# Patient Record
Sex: MALE | Race: WHITE | Employment: FULL TIME | URBAN - METROPOLITAN AREA
[De-identification: names, ages, dates, MRNs, and addresses within clinical notes are randomized per-mention and may not be internally consistent; named-entity substitution may affect disease eponyms.]

---

## 2017-10-14 ENCOUNTER — APPOINTMENT (OUTPATIENT)
Dept: CT IMAGING | Facility: HOSPITAL | Age: 67
DRG: 389 | End: 2017-10-14
Attending: EMERGENCY MEDICINE
Payer: COMMERCIAL

## 2017-10-14 ENCOUNTER — HOSPITAL ENCOUNTER (INPATIENT)
Facility: HOSPITAL | Age: 67
LOS: 2 days | Discharge: HOME | DRG: 389 | End: 2017-10-16
Attending: EMERGENCY MEDICINE | Admitting: INTERNAL MEDICINE
Payer: COMMERCIAL

## 2017-10-14 DIAGNOSIS — K22.2 STRICTURE OF ESOPHAGUS: ICD-10-CM

## 2017-10-14 DIAGNOSIS — K56.609 SBO (SMALL BOWEL OBSTRUCTION) (HCC): Primary | ICD-10-CM

## 2017-10-14 DIAGNOSIS — K50.019 CROHN'S DISEASE OF ILEUM WITH COMPLICATION (HCC): ICD-10-CM

## 2017-10-14 PROCEDURE — 99285 EMERGENCY DEPT VISIT HI MDM: CPT | Performed by: COLON & RECTAL SURGERY

## 2017-10-14 PROCEDURE — 74177 CT ABD & PELVIS W/CONTRAST: CPT | Performed by: EMERGENCY MEDICINE

## 2017-10-14 RX ORDER — ONDANSETRON 2 MG/ML
4 INJECTION INTRAMUSCULAR; INTRAVENOUS EVERY 4 HOURS PRN
Status: DISCONTINUED | OUTPATIENT
Start: 2017-10-14 | End: 2017-10-16

## 2017-10-14 RX ORDER — SODIUM CHLORIDE 9 MG/ML
INJECTION, SOLUTION INTRAVENOUS ONCE
Status: COMPLETED | OUTPATIENT
Start: 2017-10-14 | End: 2017-10-14

## 2017-10-14 RX ORDER — SODIUM CHLORIDE 9 MG/ML
INJECTION, SOLUTION INTRAVENOUS CONTINUOUS
Status: ACTIVE | OUTPATIENT
Start: 2017-10-14 | End: 2017-10-14

## 2017-10-14 RX ORDER — DEXTROSE, SODIUM CHLORIDE, AND POTASSIUM CHLORIDE 5; .45; .15 G/100ML; G/100ML; G/100ML
INJECTION INTRAVENOUS CONTINUOUS
Status: DISCONTINUED | OUTPATIENT
Start: 2017-10-14 | End: 2017-10-16

## 2017-10-14 RX ORDER — HYDROMORPHONE HYDROCHLORIDE 1 MG/ML
0.5 INJECTION, SOLUTION INTRAMUSCULAR; INTRAVENOUS; SUBCUTANEOUS
Status: DISCONTINUED | OUTPATIENT
Start: 2017-10-14 | End: 2017-10-16

## 2017-10-14 RX ORDER — ONDANSETRON 2 MG/ML
4 INJECTION INTRAMUSCULAR; INTRAVENOUS ONCE
Status: COMPLETED | OUTPATIENT
Start: 2017-10-14 | End: 2017-10-14

## 2017-10-14 RX ORDER — ONDANSETRON 2 MG/ML
4 INJECTION INTRAMUSCULAR; INTRAVENOUS EVERY 4 HOURS PRN
Status: COMPLETED | OUTPATIENT
Start: 2017-10-14 | End: 2017-10-14

## 2017-10-14 RX ORDER — HYDROMORPHONE HYDROCHLORIDE 1 MG/ML
0.5 INJECTION, SOLUTION INTRAMUSCULAR; INTRAVENOUS; SUBCUTANEOUS EVERY 30 MIN PRN
Status: ACTIVE | OUTPATIENT
Start: 2017-10-14 | End: 2017-10-14

## 2017-10-14 NOTE — ED INITIAL ASSESSMENT (HPI)
Pt to ED wih c/o N/V that started yesterday at 1400. Vomit 20-30X. Denies diarrhea. +Abd pain. Denies recent travel or abx.

## 2017-10-14 NOTE — ED PROVIDER NOTES
Patient Seen in: [facility] Emergency Department    History   Patient presents with:  Nausea/Vomiting/Diarrhea (gastrointestinal)  Constipation (gastrointestinal)    Stated Complaint: Vomiting    HPI    This is a 75-year-old male who arrives here wi facial trauma. The neck is supple. LUNGS: Clear to auscultation, there is no wheezing or retraction. No crackles. CV: Cardiovascular is regular without murmurs or rubs.     ABD: The abdomen is soft nondistended diffuse tenderness throughout the abdo 151 CBC was normal.  The patient lipase is normal.  CT scan of the abdomen shows    Ct Abdomen Pelvis Iv Contrast, No Oral (er)    Result Date: 10/14/2017  PROCEDURE:  CT ABDOMEN PELVIS IV CONTRAST, NO ORAL (ER)  COMPARISON:  JAKE SANCHES ABD   PEL C IV PO, No visible focal wall thickening, lesion, or calculus. PELVIC NODES:  No adenopathy. PELVIC ORGANS:  Pelvic organs appropriate for patient age. No free fluid. BONES:  No bony lesion or fracture. LUNG BASES:  No visible pulmonary or pleural disease.

## 2017-10-14 NOTE — H&P
Edward History and Physical Note        Zerita Class III Patient Status:  Observation    1950 MRN [id number]   [facility] 3NW-A Attending Justo Herzog MD   Hosp Day # 0 PCP To Alcaraz MD     Subjective:  70 y/o man ; n 10/14/17   0836   ALT  55   AST  41   ALB  4.0       No results for input(s): PT, INR in the last 72 hours. No results for input(s): TROP in the last 72 hours.       Assessment & Plan:    1) Partial Small Bowel Obstruction  · NPO; Pain control; antiemeti

## 2017-10-14 NOTE — CONSULTS
[facility]  Report of Consultation    Jaguar Cardenas III Patient Status:  Emergency    1950 MRN [id number]   Location [address] [facility] Attending Lynette Hoskins MD   Hosp Day # 0 PCP Alva Heart MD     Reason for Cons amounts of broccoli, asparagus, and other vegetables within the last week. He is on a health maintenance program by a nutritionist.  He states that he may have overdone it with the fruit and vegetables.     He normally sees Dr. Richard Abreu as his gastroen 128/70, pulse 70, temperature 98.3 °F (36.8 °C), temperature source Temporal, resp. rate 16, height 68\", weight 140 lb, SpO2 100 %. General: Alert, orientated x3. Cooperative. No apparent distress. HEENT: Exam is unremarkable.   Without scleral ict There is no free air or free fluid. There is no abscess, and there are no obvious fistulous. PROCEDURE:  CT ABDOMEN PELVIS IV CONTRAST, NO ORAL (ER)     COMPARISON:  JAKE SANCHES ABD   PEL C IV PO, 7/09/2012, 6:13.      INDICATIONS:  Vomiting     TECHNI focal wall thickening, lesion, or calculus. PELVIC NODES:  No adenopathy. PELVIC ORGANS:  Pelvic organs appropriate for patient age. No free fluid. BONES:  No bony lesion or fracture. LUNG BASES:  No visible pulmonary or pleural disease. this issue without surgical intervention. We will aggressively treat him medically prior to any consideration for surgical intervention. The patient admits to eating raw vegetables heavily within the last 5 days prior to admission  3.  This patient will b

## 2017-10-14 NOTE — ED NOTES
Report to [facility]. Round on pt. Updated on room number and eta to floor. No distress noted. Pt sts he is feeling a lot better since arrival. Skin pink at this time , from pale on arrival. Pt denies any needs prior to transport.

## 2017-10-14 NOTE — ED NOTES
Pt updated on poc. Per MD, pt does not have to drink oral contrast. Warm blanket provided. Lights off in room.

## 2017-10-14 NOTE — ED NOTES
500mls bolus completed. Round on pt. Pt watching TV. No distress noted. Pt denies any needs. Pending transport.

## 2017-10-14 NOTE — ED NOTES
Round on pt. Pt sleeping. Sts nausea is much better. Pt updated on poc.  Denies any needs at this time

## 2017-10-14 NOTE — ED NOTES
Round on pt. Sts nausea has improved. More blankets provided. Ready for CT. Pt denies any other needs at this time.

## 2017-10-15 NOTE — PROGRESS NOTES
[facility]  Progress Note    Haji Showman III Patient Status:  Observation    1950 MRN [id number]   [facility] 3NW-A Attending Fabiana Gaston MD   Hosp Day # 0 PCP Desiree Ignacio MD         SUBJECTIVE:  Subjective:  Yousif 10/15/17 0801     HYDROmorphone HCl PF, ondansetron HCl       Assessment/Plan:     Principal Problem:    SBO (small bowel obstruction)  Active Problems:    Crohn's disease of ileum with complication (HCC)    Stricture of esophagus          Plan:  Continue

## 2017-10-15 NOTE — PAYOR COMM NOTE
--------------  ADMISSION REVIEW     PayorChancy Levels [facility]    10/14    ED       Patient presents with:  Nausea/Vomiting/Diarrhea   Constipation      Stated Complaint: Vomiting          This is a 68-year-old male who arrives here with complaints of vomiting.   Megan Cole normal limits   LIPASE - Normal   CBC WITH DIFFERENTIAL WITH PLATELET     Narrative:      The following orders were created for panel order CBC WITH DIFFERENTIAL WITH PLATELET.   Procedure                               Abnormality         Status

## 2017-10-15 NOTE — PROGRESS NOTES
Patient educated on full liquid diet and provided a menu. Pt states he will stay on clears due to home diet restrictions. Denies pain or nausea after clear liquids. Answered all questions. Will continue to monitor.

## 2017-10-15 NOTE — PROGRESS NOTES
[facility]  Progress Note    Sunny Brown III Patient Status:  Observation    1950 MRN [id number]   [facility] 3NW-A Attending Jean-Paul Nova MD   Hosp Day # 0 PCP Marion Holland MD     Subjective:  No new complaints, no a

## 2017-10-16 VITALS
BODY MASS INDEX: 21.22 KG/M2 | TEMPERATURE: 97 F | SYSTOLIC BLOOD PRESSURE: 106 MMHG | DIASTOLIC BLOOD PRESSURE: 56 MMHG | RESPIRATION RATE: 18 BRPM | OXYGEN SATURATION: 100 % | WEIGHT: 140 LBS | HEART RATE: 46 BPM | HEIGHT: 68 IN

## 2017-10-16 PROCEDURE — 99232 SBSQ HOSP IP/OBS MODERATE 35: CPT | Performed by: COLON & RECTAL SURGERY

## 2017-10-16 NOTE — PROGRESS NOTES
Patient was discharged home. Discharge instructions provided and reviewed. Patient left in stable condition. All questions answered. Left unit without an IV access.

## 2017-10-16 NOTE — PROGRESS NOTES
[facility]  Progress Note    Suleman [place] III Patient Status:  Observation    1950 MRN [id number]   [facility] 3NW-A Attending Umair Silveira MD   Hosp Day # 2 PCP Karol Springer MD     Subjective:  No new complaints, no a the patient and reviewed all relevant labs and reports. I agree with her physical exam and the data listed in the report, and I have made any relevant changes in editing her note.     I agree with the above listed assessment and plan and again have modified

## 2017-10-16 NOTE — PAYOR COMM NOTE
--------------  DISCHARGE REVIEW    Payor: Griffith Proc. Baudilio Mccormick 1 #:  [id number]  Authorization Number: [id number]    Admit date: 10/14/17  Admit time:  5  Discharge Date: 10/16/2017  3:23 PM     Admitting Physician: Thaddeus Marsh MD  Attending Physi

## 2017-10-16 NOTE — PROGRESS NOTES
[facility]    Progress Note    Zerita Class III Patient Status:  Inpatient    1950 MRN [id number]   [facility] 3NW-A Attending Justo Herzog MD   Hosp Day # 2 PCP To Alcaraz MD       SUBJECTIVE:  No CP, SOB, or N/V. patient reluctant to take full liquids as he avoids dairy products.   He claims to be on a special nutritional program  Nausea–this has improved  B12 deficiency–treated regularly at our office  General–continue to monitor patient      Total Time spent with

## 2017-10-27 NOTE — DISCHARGE SUMMARY
[facility]  Discharge Summary    Patrick Wilkes III Patient Status:  Inpatient    1950 MRN [id number]   [facility] 3NW-A Attending No att. providers found   Hosp Day # 2 PCP Paul Vázquez MD     Date of Admission: 10/14/2017

## 2018-07-05 ENCOUNTER — HOSPITAL ENCOUNTER (OUTPATIENT)
Dept: BONE DENSITY | Age: 68
Discharge: HOME | End: 2018-07-05
Attending: INTERNAL MEDICINE
Payer: COMMERCIAL

## 2018-07-05 DIAGNOSIS — M81.0 OSTEOPOROSIS: ICD-10-CM

## 2018-07-05 PROCEDURE — 77080 DXA BONE DENSITY AXIAL: CPT | Performed by: INTERNAL MEDICINE

## 2018-11-21 ENCOUNTER — HOSPITAL ENCOUNTER (OUTPATIENT)
Dept: GENERAL RADIOLOGY | Facility: HOSPITAL | Age: 68
Discharge: HOME | End: 2018-11-21
Attending: INTERNAL MEDICINE
Payer: COMMERCIAL

## 2018-11-21 DIAGNOSIS — R06.2 WHEEZING: ICD-10-CM

## 2018-11-21 PROCEDURE — 71046 X-RAY EXAM CHEST 2 VIEWS: CPT | Performed by: INTERNAL MEDICINE

## 2018-11-28 ENCOUNTER — HOSPITAL ENCOUNTER (OUTPATIENT)
Dept: GENERAL RADIOLOGY | Age: 68
Discharge: HOME | End: 2018-11-28
Attending: INTERNAL MEDICINE
Payer: COMMERCIAL

## 2018-11-28 DIAGNOSIS — R61 NIGHT SWEATS: ICD-10-CM

## 2018-11-28 DIAGNOSIS — J18.9 PNEUMONIA: ICD-10-CM

## 2018-11-28 PROCEDURE — 71046 X-RAY EXAM CHEST 2 VIEWS: CPT | Performed by: INTERNAL MEDICINE

## 2018-12-13 ENCOUNTER — HOSPITAL ENCOUNTER (OUTPATIENT)
Dept: GENERAL RADIOLOGY | Facility: HOSPITAL | Age: 68
Discharge: HOME | End: 2018-12-13
Attending: INTERNAL MEDICINE
Payer: COMMERCIAL

## 2018-12-13 DIAGNOSIS — J18.9 PNEUMONIA: ICD-10-CM

## 2018-12-13 PROCEDURE — 71046 X-RAY EXAM CHEST 2 VIEWS: CPT | Performed by: INTERNAL MEDICINE

## 2019-01-16 ENCOUNTER — APPOINTMENT (OUTPATIENT)
Dept: GENERAL RADIOLOGY | Facility: HOSPITAL | Age: 69
End: 2019-01-16
Payer: COMMERCIAL

## 2019-01-16 ENCOUNTER — HOSPITAL ENCOUNTER (EMERGENCY)
Facility: HOSPITAL | Age: 69
Discharge: HOME | End: 2019-01-16
Attending: EMERGENCY MEDICINE
Payer: COMMERCIAL

## 2019-01-16 VITALS
TEMPERATURE: 98 F | DIASTOLIC BLOOD PRESSURE: 74 MMHG | RESPIRATION RATE: 16 BRPM | HEIGHT: 67 IN | BODY MASS INDEX: 21.19 KG/M2 | SYSTOLIC BLOOD PRESSURE: 125 MMHG | OXYGEN SATURATION: 100 % | WEIGHT: 135 LBS | HEART RATE: 63 BPM

## 2019-01-16 DIAGNOSIS — S42.211A CLOSED DISPLACED FRACTURE OF SURGICAL NECK OF RIGHT HUMERUS, UNSPECIFIED FRACTURE MORPHOLOGY, INITIAL ENCOUNTER: Primary | ICD-10-CM

## 2019-01-16 PROCEDURE — 96372 THER/PROPH/DIAG INJ SC/IM: CPT

## 2019-01-16 PROCEDURE — 99284 EMERGENCY DEPT VISIT MOD MDM: CPT

## 2019-01-16 PROCEDURE — 73030 X-RAY EXAM OF SHOULDER: CPT | Performed by: EMERGENCY MEDICINE

## 2019-01-16 RX ORDER — ONDANSETRON 4 MG/1
4 TABLET, ORALLY DISINTEGRATING ORAL ONCE
Status: COMPLETED | OUTPATIENT
Start: 2019-01-16 | End: 2019-01-16

## 2019-01-16 RX ORDER — KETOROLAC TROMETHAMINE 30 MG/ML
30 INJECTION, SOLUTION INTRAMUSCULAR; INTRAVENOUS ONCE
Status: COMPLETED | OUTPATIENT
Start: 2019-01-16 | End: 2019-01-16

## 2019-01-16 RX ORDER — MORPHINE SULFATE 4 MG/ML
2 INJECTION, SOLUTION INTRAMUSCULAR; INTRAVENOUS ONCE
Status: COMPLETED | OUTPATIENT
Start: 2019-01-16 | End: 2019-01-16

## 2019-01-16 RX ORDER — HYDROCODONE BITARTRATE AND ACETAMINOPHEN 5; 325 MG/1; MG/1
1 TABLET ORAL EVERY 6 HOURS PRN
Qty: 10 TABLET | Refills: 0 | Status: SHIPPED | OUTPATIENT
Start: 2019-01-16 | End: 2019-01-23

## 2019-01-17 NOTE — ED PROVIDER NOTES
Patient Seen in: [facility] Emergency Department    History   Patient presents with:  Upper Extremity Injury (musculoskeletal)  Trauma (cardiovascular, musculoskeletal)    Stated Complaint: Fall on ice - rt shoulder injury     KEN Prasad is a 68-yea sounds and intact distal pulses. Pulmonary/Chest: Effort normal and breath sounds normal. No respiratory distress. Musculoskeletal:        Right shoulder: He exhibits decreased range of motion, tenderness (Proximal humerus ) and bony tenderness.  He exh symptoms arise. Additional verbal discharge instructions are given and discussed. Discharge medications are discussed. The patient is in good condition throughout the visit today and remains so upon discharge.   I discuss the plan of care with the patient,

## 2019-01-17 NOTE — ED PROVIDER NOTES
I reviewed that chart and discussed the case. I have examined the patient and noted neurovascularly intact of the affected upper extremity pulse 2+ no breaks in the skin no head injury noted.   Lungs are clear cardiovascular exam shows regular rate and rhy

## 2019-08-11 ENCOUNTER — HOSPITAL ENCOUNTER (OUTPATIENT)
Age: 69
Discharge: HOME | End: 2019-08-11
Attending: FAMILY MEDICINE
Payer: COMMERCIAL

## 2019-08-11 ENCOUNTER — APPOINTMENT (OUTPATIENT)
Dept: GENERAL RADIOLOGY | Age: 69
End: 2019-08-11
Attending: FAMILY MEDICINE
Payer: COMMERCIAL

## 2019-08-11 VITALS
TEMPERATURE: 98 F | OXYGEN SATURATION: 96 % | SYSTOLIC BLOOD PRESSURE: 115 MMHG | WEIGHT: 140 LBS | RESPIRATION RATE: 18 BRPM | DIASTOLIC BLOOD PRESSURE: 58 MMHG | HEART RATE: 68 BPM | HEIGHT: 67 IN | BODY MASS INDEX: 21.97 KG/M2

## 2019-08-11 DIAGNOSIS — K59.00 CONSTIPATION, UNSPECIFIED CONSTIPATION TYPE: ICD-10-CM

## 2019-08-11 DIAGNOSIS — R53.83 FATIGUE, UNSPECIFIED TYPE: Primary | ICD-10-CM

## 2019-08-11 DIAGNOSIS — R63.0 NO APPETITE: ICD-10-CM

## 2019-08-11 LAB
#MXD IC: 0.5 X10ˆ3/UL (ref 0.1–1)
CREAT BLD-MCNC: 1.1 MG/DL (ref 0.7–1.3)
GLUCOSE BLD-MCNC: 94 MG/DL (ref 70–99)
HCT VFR BLD AUTO: 39.7 % (ref 39–53)
HGB BLD-MCNC: 13.7 G/DL (ref 13–17.5)
ISTAT BUN: 14 MG/DL (ref 8–20)
ISTAT CHLORIDE: 102 MMOL/L (ref 101–111)
ISTAT HEMATOCRIT: 41 % (ref 37–53)
ISTAT IONIZED CALCIUM FOR CHEM 8: 1.2 MMOL/L (ref 1.12–1.32)
ISTAT POTASSIUM: 3.7 MMOL/L (ref 3.6–5.1)
ISTAT SODIUM: 140 MMOL/L (ref 136–145)
LYMPHOCYTES # BLD AUTO: 1.7 X10ˆ3/UL (ref 1–4)
LYMPHOCYTES NFR BLD AUTO: 25.4 %
MCH RBC QN AUTO: 31.4 PG (ref 26–34)
MCHC RBC AUTO-ENTMCNC: 34.5 G/DL (ref 31–37)
MCV RBC AUTO: 91.1 FL (ref 80–100)
MIXED CELL %: 8.1 %
NEUTROPHILS # BLD AUTO: 4.4 X10ˆ3/UL (ref 1.5–7.7)
NEUTROPHILS NFR BLD AUTO: 66.5 %
PLATELET # BLD AUTO: 183 X10ˆ3/UL (ref 150–450)
RBC # BLD AUTO: 4.36 X10ˆ6/UL (ref 3.8–5.8)
WBC # BLD AUTO: 6.6 X10ˆ3/UL (ref 4–11)

## 2019-08-11 PROCEDURE — 99214 OFFICE O/P EST MOD 30 MIN: CPT

## 2019-08-11 PROCEDURE — 99204 OFFICE O/P NEW MOD 45 MIN: CPT

## 2019-08-11 PROCEDURE — 85025 COMPLETE CBC W/AUTO DIFF WBC: CPT | Performed by: FAMILY MEDICINE

## 2019-08-11 PROCEDURE — 80047 BASIC METABLC PNL IONIZED CA: CPT

## 2019-08-11 PROCEDURE — 74018 RADEX ABDOMEN 1 VIEW: CPT | Performed by: FAMILY MEDICINE

## 2019-08-11 RX ORDER — FAMOTIDINE 20 MG/1
TABLET ORAL
Qty: 30 TABLET | Refills: 0 | Status: SHIPPED | OUTPATIENT
Start: 2019-08-11 | End: 2022-01-31 | Stop reason: ALTCHOICE

## 2019-08-11 NOTE — ED INITIAL ASSESSMENT (HPI)
Patent states 2 weeks of fatigue  Denies any emesis or nausea  States increased sleep intake  Poor appetite  Denies any abdominal pain  Denies any fever or chills

## 2019-08-11 NOTE — ED PROVIDER NOTES
Patient Seen in: [facility]    History   Patient presents with:  Fatigue    Stated Complaint: FATIGUE/LOSS OF APPETITE/NAUSEA X 3 WEEKS    + fatigue, decreased appetite, slight nausea x 3 weeks. No F/C/sweats. No abd pain.   No exhibits no discharge. No scleral icterus. Cardiovascular: Normal rate, regular rhythm and normal heart sounds. Pulmonary/Chest: Effort normal and breath sounds normal. No respiratory distress. He has no wheezes. He has no rales. Abdominal: Soft.  [place]

## 2021-03-15 DIAGNOSIS — Z23 NEED FOR VACCINATION: ICD-10-CM

## 2021-12-20 ENCOUNTER — LAB ENCOUNTER (OUTPATIENT)
Dept: LAB | Facility: HOSPITAL | Age: 71
End: 2021-12-20
Attending: INTERNAL MEDICINE
Payer: MEDICARE

## 2021-12-20 DIAGNOSIS — Z20.828 EXPOSURE TO SARS-ASSOCIATED CORONAVIRUS: ICD-10-CM

## 2021-12-20 DIAGNOSIS — Z11.59 ENCOUNTER FOR SCREENING FOR OTHER VIRAL DISEASES: Primary | ICD-10-CM

## 2021-12-20 PROCEDURE — 87430 STREP A AG IA: CPT

## 2021-12-28 ENCOUNTER — NURSE ONLY (OUTPATIENT)
Dept: LAB | Facility: HOSPITAL | Age: 71
End: 2021-12-28
Attending: INTERNAL MEDICINE
Payer: MEDICARE

## 2021-12-28 DIAGNOSIS — Z20.828 EXPOSURE TO SARS-ASSOCIATED CORONAVIRUS: ICD-10-CM

## 2021-12-30 LAB — SARS-COV-2 RNA RESP QL NAA+PROBE: DETECTED

## 2022-04-12 ENCOUNTER — LAB ENCOUNTER (OUTPATIENT)
Dept: LAB | Facility: HOSPITAL | Age: 72
End: 2022-04-12
Attending: INTERNAL MEDICINE
Payer: MEDICARE

## 2022-04-12 DIAGNOSIS — Z01.812 ENCOUNTER FOR PREPROCEDURE SCREENING LABORATORY TESTING FOR COVID-19: ICD-10-CM

## 2022-04-12 DIAGNOSIS — Z20.822 ENCOUNTER FOR PREPROCEDURE SCREENING LABORATORY TESTING FOR COVID-19: ICD-10-CM

## 2022-04-12 LAB — SARS-COV-2 RNA RESP QL NAA+PROBE: NOT DETECTED

## 2022-04-13 RX ORDER — PROPRANOLOL/HYDROCHLOROTHIAZID 40 MG-25MG
TABLET ORAL DAILY
COMMUNITY

## 2022-04-15 ENCOUNTER — HOSPITAL ENCOUNTER (OUTPATIENT)
Facility: HOSPITAL | Age: 72
Setting detail: HOSPITAL OUTPATIENT SURGERY
Discharge: HOME | End: 2022-04-15
Attending: INTERNAL MEDICINE | Admitting: INTERNAL MEDICINE
Payer: MEDICARE

## 2022-04-15 ENCOUNTER — ANESTHESIA EVENT (OUTPATIENT)
Dept: ENDOSCOPY | Facility: HOSPITAL | Age: 72
End: 2022-04-15
Payer: MEDICARE

## 2022-04-15 ENCOUNTER — ANESTHESIA (OUTPATIENT)
Dept: ENDOSCOPY | Facility: HOSPITAL | Age: 72
End: 2022-04-15
Payer: MEDICARE

## 2022-04-15 VITALS
DIASTOLIC BLOOD PRESSURE: 57 MMHG | HEART RATE: 52 BPM | HEIGHT: 67 IN | RESPIRATION RATE: 18 BRPM | SYSTOLIC BLOOD PRESSURE: 106 MMHG | BODY MASS INDEX: 21.97 KG/M2 | WEIGHT: 140 LBS | TEMPERATURE: 98 F | OXYGEN SATURATION: 100 %

## 2022-04-15 DIAGNOSIS — K44.9 HIATAL HERNIA: ICD-10-CM

## 2022-04-15 DIAGNOSIS — Z20.822 ENCOUNTER FOR PREPROCEDURE SCREENING LABORATORY TESTING FOR COVID-19: Primary | ICD-10-CM

## 2022-04-15 DIAGNOSIS — Z01.812 ENCOUNTER FOR PREPROCEDURE SCREENING LABORATORY TESTING FOR COVID-19: Primary | ICD-10-CM

## 2022-04-15 DIAGNOSIS — Z86.010 PERSONAL HISTORY OF COLONIC POLYPS: ICD-10-CM

## 2022-04-15 DIAGNOSIS — D50.9 IRON DEFICIENCY ANEMIA, UNSPECIFIED IRON DEFICIENCY ANEMIA TYPE: ICD-10-CM

## 2022-04-15 DIAGNOSIS — K50.10 CROHN'S DISEASE OF LARGE INTESTINE WITHOUT COMPLICATION (HCC): ICD-10-CM

## 2022-04-15 DIAGNOSIS — K21.9 GASTROESOPHAGEAL REFLUX DISEASE WITHOUT ESOPHAGITIS: ICD-10-CM

## 2022-04-15 PROCEDURE — 0DB98ZX EXCISION OF DUODENUM, VIA NATURAL OR ARTIFICIAL OPENING ENDOSCOPIC, DIAGNOSTIC: ICD-10-PCS | Performed by: INTERNAL MEDICINE

## 2022-04-15 PROCEDURE — 88305 TISSUE EXAM BY PATHOLOGIST: CPT | Performed by: INTERNAL MEDICINE

## 2022-04-15 PROCEDURE — 0DBB8ZX EXCISION OF ILEUM, VIA NATURAL OR ARTIFICIAL OPENING ENDOSCOPIC, DIAGNOSTIC: ICD-10-PCS | Performed by: INTERNAL MEDICINE

## 2022-04-15 PROCEDURE — 0DBP8ZX EXCISION OF RECTUM, VIA NATURAL OR ARTIFICIAL OPENING ENDOSCOPIC, DIAGNOSTIC: ICD-10-PCS | Performed by: INTERNAL MEDICINE

## 2022-04-15 PROCEDURE — 0DB68ZX EXCISION OF STOMACH, VIA NATURAL OR ARTIFICIAL OPENING ENDOSCOPIC, DIAGNOSTIC: ICD-10-PCS | Performed by: INTERNAL MEDICINE

## 2022-04-15 PROCEDURE — 0DBN8ZX EXCISION OF SIGMOID COLON, VIA NATURAL OR ARTIFICIAL OPENING ENDOSCOPIC, DIAGNOSTIC: ICD-10-PCS | Performed by: INTERNAL MEDICINE

## 2022-04-15 PROCEDURE — 0DBM8ZX EXCISION OF DESCENDING COLON, VIA NATURAL OR ARTIFICIAL OPENING ENDOSCOPIC, DIAGNOSTIC: ICD-10-PCS | Performed by: INTERNAL MEDICINE

## 2022-04-15 PROCEDURE — 0DBF8ZX EXCISION OF RIGHT LARGE INTESTINE, VIA NATURAL OR ARTIFICIAL OPENING ENDOSCOPIC, DIAGNOSTIC: ICD-10-PCS | Performed by: INTERNAL MEDICINE

## 2022-04-15 PROCEDURE — 0DBL8ZX EXCISION OF TRANSVERSE COLON, VIA NATURAL OR ARTIFICIAL OPENING ENDOSCOPIC, DIAGNOSTIC: ICD-10-PCS | Performed by: INTERNAL MEDICINE

## 2022-04-15 RX ORDER — ONDANSETRON 2 MG/ML
4 INJECTION INTRAMUSCULAR; INTRAVENOUS AS NEEDED
OUTPATIENT
Start: 2022-04-15 | End: 2022-04-15

## 2022-04-15 RX ORDER — LIDOCAINE HYDROCHLORIDE 10 MG/ML
INJECTION, SOLUTION EPIDURAL; INFILTRATION; INTRACAUDAL; PERINEURAL AS NEEDED
Status: DISCONTINUED | OUTPATIENT
Start: 2022-04-15 | End: 2022-04-15 | Stop reason: SURG

## 2022-04-15 RX ORDER — NALOXONE HYDROCHLORIDE 0.4 MG/ML
80 INJECTION, SOLUTION INTRAMUSCULAR; INTRAVENOUS; SUBCUTANEOUS AS NEEDED
OUTPATIENT
Start: 2022-04-15 | End: 2022-04-15

## 2022-04-15 RX ORDER — SODIUM CHLORIDE, SODIUM LACTATE, POTASSIUM CHLORIDE, CALCIUM CHLORIDE 600; 310; 30; 20 MG/100ML; MG/100ML; MG/100ML; MG/100ML
INJECTION, SOLUTION INTRAVENOUS CONTINUOUS
OUTPATIENT
Start: 2022-04-15

## 2022-04-15 RX ORDER — SODIUM CHLORIDE, SODIUM LACTATE, POTASSIUM CHLORIDE, CALCIUM CHLORIDE 600; 310; 30; 20 MG/100ML; MG/100ML; MG/100ML; MG/100ML
INJECTION, SOLUTION INTRAVENOUS CONTINUOUS
Status: DISCONTINUED | OUTPATIENT
Start: 2022-04-15 | End: 2022-04-15

## 2022-04-15 RX ORDER — PHENYLEPHRINE HCL 10 MG/ML
VIAL (ML) INJECTION AS NEEDED
Status: DISCONTINUED | OUTPATIENT
Start: 2022-04-15 | End: 2022-04-15 | Stop reason: SURG

## 2022-04-15 RX ADMIN — PHENYLEPHRINE HCL 100 MCG: 10 MG/ML VIAL (ML) INJECTION at 09:36:00

## 2022-04-15 RX ADMIN — PHENYLEPHRINE HCL 100 MCG: 10 MG/ML VIAL (ML) INJECTION at 09:13:00

## 2022-04-15 RX ADMIN — PHENYLEPHRINE HCL 100 MCG: 10 MG/ML VIAL (ML) INJECTION at 09:21:00

## 2022-04-15 RX ADMIN — PHENYLEPHRINE HCL 100 MCG: 10 MG/ML VIAL (ML) INJECTION at 08:54:00

## 2022-04-15 RX ADMIN — PHENYLEPHRINE HCL 100 MCG: 10 MG/ML VIAL (ML) INJECTION at 09:30:00

## 2022-04-15 RX ADMIN — LIDOCAINE HYDROCHLORIDE 50 MG: 10 INJECTION, SOLUTION EPIDURAL; INFILTRATION; INTRACAUDAL; PERINEURAL at 08:44:00

## 2022-04-15 RX ADMIN — SODIUM CHLORIDE, SODIUM LACTATE, POTASSIUM CHLORIDE, CALCIUM CHLORIDE: 600; 310; 30; 20 INJECTION, SOLUTION INTRAVENOUS at 08:41:00

## 2022-04-15 NOTE — ANESTHESIA POSTPROCEDURE EVALUATION
[facility] Joline Circle III Patient Status:  Hospital Outpatient Surgery   Age/Gender 66year old male MRN [id number]   Location [address] [facility] 28 Attending Sari Estes MD   Hosp Day # 0 PCP Janine Guidry MD       Anesthesia Post-op Note    ESOPHAGOGASTRODUODENOSCOPY with biopsies,  COLONOSCOPY with biopsies    Procedure Summary     Date: 04/15/22 Room / Location: [facility] ENDOSCOPY 02 / [facility] ENDOSCOPY    Anesthesia Start: 4256 Anesthesia Stop: 8694    Procedures:       ESOPHAGOGASTRODUODENOSCOPY with biopsies, COLONOSCOPY with biopsies (N/A )      COLONOSCOPY (N/A ) Diagnosis:       Crohn's disease of large intestine without complication ([address]      Personal history of colonic polyps      Iron deficiency anemia, unspecified iron deficiency anemia type      Gastroesophageal reflux disease without esophagitis      Hiatal hernia      (EGD: hiatal hernia, class A esophagitis COLON: active crohn's at the ileocolonic anastamosis, hemorrhoids)    Surgeons: Sari Estes MD Anesthesiologist: Shanon Young MD    Anesthesia Type: general ASA Status: 2          Anesthesia Type: general    Vitals Value Taken Time   /53 04/15/22 1014   Temp 98 04/15/22 1019   Pulse 55 04/15/22 1018   Resp 12 04/15/22 1019   SpO2 100 % 04/15/22 1018   Vitals shown include unvalidated device data. Patient Location: PACU    Anesthesia Type: general    Airway Patency: patent    Postop Pain Control: adequate    Mental Status: preanesthetic baseline    Nausea/Vomiting: none    Cardiopulmonary/Hydration status: stable euvolemic    Complications: no apparent anesthesia related complications    Postop vital signs: stable    Dental Exam: Unchanged from Preop    Patient to be discharged home when criteria met.

## 2022-04-15 NOTE — OPERATIVE REPORT
EGD operative report  Patient Name: Hoa Partida III  Procedure: Esophagogastroduodenoscopy with cold forceps biopsy   Date of procedure: 4/15/2022    Indication: Iron deficiency anemia  Attending: Tor Clifford M.D. Consent:  The risks, benefits, and alternatives were discussed with the patient / POA. Risks included, but were not limited to, bleeding, perforation, medication effects, cardiac arrhythmias, and aspiration. After all questions were answered to their satisfaction, a signed, informed, and witnessed consent was obtained. Timeout:  Prior to initiation of sedation, a formal timeout was performed, confirming the patient's name, date of birth, allergies, correct procedure, and need for antibiotics. The operating physician and sedating physician was also confirmed prior to initiation of sedation. Sedation: Monitored Anesthesia Care. Monitoring:  Pulsoximetry, pulse, respirations, and blood pressure were monitored throughout the entire procedure  Procedure: After achieving adequate sedation and placing the patient in the left lateral decubitus position, the lubricated upper endoscope was introduced into the mouth and advanced to the descending duodenum. The endoscope was then withdrawn into the gastric antrum and placed in a retroflexed position. The endoscope was then righted, and air was suctioned from the stomach. The endoscope was then withdrawn from the patient, with careful visual inspection of the mucosa revealing no additional pathologic findings. The patient tolerated the procedure without apparent procedural complications. The patient left the procedure room in stable condition for recovery. Findings: Esophagus: The mucosa revealed LA class A esophagitis. There were no masses, polyps, ulcers, diverticula, or varices. The squamocolumnar junction / esophagogastric junction was appreciated at 40 cm from the incisors.   The diaphragmatic impression was appreciated at 44 cm from the incisors. Stomach: The gastric body, antrum, fundus, cardia, and angularis were normal, without masses, polyps, ulcers, erosions, diverticula, or varices. Cold forceps biopsies were obtained from the antrum, body, and fundus, to evaluate for H.pylori. Duodenum: The duodenal bulb, post-bulbar duodenum, and descending duodenum were normal, without masses, polyps, ulcers, erosions, diverticula, or varices. Cold forceps biopsies were obtained from the distal and proximal duodenum to evaluate for Celiac sprue. Impression: Findings as above  Recommendations:   1) Follow-up pathology  2) Manage heartburn if symptomatic  3) No routine follow-up EGD is indicated.

## 2022-04-15 NOTE — OPERATIVE REPORT
Colon operative report  Patient Name: Keli Garcia III  Procedure: Colonoscopy with cold forceps biopsies  Date of procedure: 4/15/2022    Indication: Crohn's disease; personal history of adenomatous colon polyps  Date of last colonoscopy: 2012  Attending: Adamaris Álvarez M.D. Consent: The risks, benefits, and alternatives were discussed with the patient / POA. Risks included, but were not limited to, bleeding, perforation, medication effects, cardiac arrhythmias, missed polyps, and aspiration. After all questions were answered to their satisfaction, a signed, informed, and witnessed consent was obtained. Timeout:  Prior to initiation of sedation, a formal timeout was performed, confirming the patient's name, date of birth, allergies, correct procedure, and need for antibiotics. The operating physician and sedating physician was also confirmed prior to initiation of sedation. Sedation: Monitored Anesthesia Care. Monitoring: Pulsoximetry, pulse, respirations, and blood pressure were monitored throughout the entire procedure    Preparation Quality: Adequate           Dayton Prep Score:  Right: 2, Middle: 2, Left: 2    Total: 6  Procedure: After achieving adequate sedation, and placing the patient in the left lateral decubitus position, a digital rectal examination was performed. The lubricated tip of the pediatric colonoscope was then introduced into the rectum and advanced to the terminal ileum. The appendiceal orifice and ileocecal valve were clearly and distinctly visualized, thus verifying the cecum. The terminal ileum was intubated and found to be normal to the extent examined. The endoscope was then carefully withdrawn from the patient with careful visualization of the colonic mucosa revealing no additional pathologic findings. Air was suctioned to the best of my ability, during withdrawal of the endoscope.   When the endoscope reached the rectum, it was placed in a retroflexed position, and the rectal bulb was thus visualized. The endoscope was righted, and air was suctioned from the colon to the best of my ability, as it was during withdrawn from the colon. The endoscope was then removed from the patient. The patient tolerated the procedure without apparent procedural complications. The patient left the procedure room in stable condition for recovery. Findings: There were grade II internal hemorrhoids. There were no masses, fissures, fistulae, or external hemorrhoids. The mucosa of the colon  was normal, from the rectum to the cecum. There were no masses, polyps, ulcers, or erosions. A rare sigmoid diverticulum was appreciated. A right hemicolectomy was evident, with a end-side ileocolonic anastomosis. The ileocolonic anastomosis did revealed mild erythema with erosions. Scarring was appreciated in the right colon and transverse colon, suggesting prior active disease. The ileocolonic anastomosis was mildly stenotic, but able to be traversed. Mild dilation of the anastomosis occurred from passage of the colonoscope into the terminal ileum. The terminal ileum was intubated and the terminal ileal mucosa was found to be normal to the extent examined. Cold forceps biopsies were obtained from the satnam-terminal ileum, ileocolonic anastomosis, right colon, transverse colon, descending colon, sigmoid colon, and rectum to evaluate for histologic activity. The colonic mucosa was inspected using high definition white light and narrow band imaging. No visible polypoid or non-polypoid dysplasia was appreciated. The colon was quite tortuous, requiring extrinisic compression and positional changes. Impression: Findings as above suggest generally crohn's disease in remission, with some mild activity at the ileocolonic anastomosis.   Recommendations:    1) Follow-up pathology   2) Will determine need for treatment based on symptoms   3) Given mild stenosis at Ileocolonic anastomosis, and history of stricturing phenotype, will not pursue video capsule endoscopy. 4) CT enterography    Repeat Colonoscopy Indicated: 3 years for colon cancer surveillance of crohn's colitis.

## 2022-04-20 ENCOUNTER — LAB ENCOUNTER (OUTPATIENT)
Dept: LAB | Facility: HOSPITAL | Age: 72
End: 2022-04-20
Attending: INTERNAL MEDICINE
Payer: MEDICARE

## 2022-04-20 DIAGNOSIS — K31.9 MUCOSAL ABNORMALITY OF DUODENUM: ICD-10-CM

## 2022-04-20 LAB — IGA SERPL-MCNC: 254 MG/DL (ref 70–312)

## 2022-04-20 PROCEDURE — 86364 TISS TRNSGLTMNASE EA IG CLAS: CPT

## 2022-04-20 PROCEDURE — 36415 COLL VENOUS BLD VENIPUNCTURE: CPT

## 2022-04-22 LAB — TTG IGA SER-ACNC: 0.9 U/ML (ref ?–7)

## 2022-04-28 ENCOUNTER — HOSPITAL ENCOUNTER (OUTPATIENT)
Dept: CT IMAGING | Facility: HOSPITAL | Age: 72
Discharge: HOME | End: 2022-04-28
Attending: INTERNAL MEDICINE
Payer: MEDICARE

## 2022-04-28 DIAGNOSIS — K50.80 CROHN'S DISEASE OF BOTH SMALL AND LARGE INTESTINE WITHOUT COMPLICATION (HCC): ICD-10-CM

## 2022-04-28 DIAGNOSIS — D50.9 IRON DEFICIENCY ANEMIA, UNSPECIFIED IRON DEFICIENCY ANEMIA TYPE: ICD-10-CM

## 2022-04-28 LAB — CREAT BLD-MCNC: 1.1 MG/DL

## 2022-04-28 PROCEDURE — 82565 ASSAY OF CREATININE: CPT

## 2022-04-28 PROCEDURE — 74177 CT ABD & PELVIS W/CONTRAST: CPT | Performed by: INTERNAL MEDICINE

## 2022-04-28 RX ORDER — IOHEXOL 350 MG/ML
85 INJECTION, SOLUTION INTRAVENOUS
Status: COMPLETED | OUTPATIENT
Start: 2022-04-28 | End: 2022-04-28

## 2022-04-28 RX ADMIN — IOHEXOL 85 ML: 350 INJECTION, SOLUTION INTRAVENOUS at 15:02:00

## 2022-10-01 ENCOUNTER — HOSPITAL ENCOUNTER (OUTPATIENT)
Dept: CT IMAGING | Facility: HOSPITAL | Age: 72
Discharge: HOME | End: 2022-10-01
Attending: INTERNAL MEDICINE
Payer: MEDICARE

## 2022-10-01 ENCOUNTER — LAB ENCOUNTER (OUTPATIENT)
Dept: LAB | Facility: HOSPITAL | Age: 72
End: 2022-10-01
Attending: INTERNAL MEDICINE
Payer: MEDICARE

## 2022-10-01 DIAGNOSIS — R91.1 PULMONARY NODULE: ICD-10-CM

## 2022-10-01 LAB
CREAT BLD-MCNC: 1.2 MG/DL
GFR SERPLBLD BASED ON 1.73 SQ M-ARVRAT: 63 ML/MIN/1.73M2 (ref 60–?)

## 2022-10-01 PROCEDURE — 71260 CT THORAX DX C+: CPT | Performed by: INTERNAL MEDICINE

## 2022-10-01 PROCEDURE — 82728 ASSAY OF FERRITIN: CPT | Performed by: NURSE PRACTITIONER

## 2022-10-01 PROCEDURE — 82746 ASSAY OF FOLIC ACID SERUM: CPT | Performed by: NURSE PRACTITIONER

## 2022-10-01 PROCEDURE — 80053 COMPREHEN METABOLIC PANEL: CPT | Performed by: NURSE PRACTITIONER

## 2022-10-01 PROCEDURE — 36415 COLL VENOUS BLD VENIPUNCTURE: CPT | Performed by: NURSE PRACTITIONER

## 2022-10-01 PROCEDURE — 86140 C-REACTIVE PROTEIN: CPT | Performed by: NURSE PRACTITIONER

## 2022-10-01 PROCEDURE — 83540 ASSAY OF IRON: CPT | Performed by: NURSE PRACTITIONER

## 2022-10-01 PROCEDURE — 85025 COMPLETE CBC W/AUTO DIFF WBC: CPT | Performed by: NURSE PRACTITIONER

## 2022-10-01 PROCEDURE — 82607 VITAMIN B-12: CPT | Performed by: NURSE PRACTITIONER

## 2022-10-01 PROCEDURE — 83550 IRON BINDING TEST: CPT | Performed by: NURSE PRACTITIONER

## 2022-10-01 PROCEDURE — 82565 ASSAY OF CREATININE: CPT

## 2022-10-01 RX ORDER — IOHEXOL 350 MG/ML
75 INJECTION, SOLUTION INTRAVENOUS
Status: COMPLETED | OUTPATIENT
Start: 2022-10-01 | End: 2022-10-01

## 2022-10-01 RX ADMIN — IOHEXOL 75 ML: 350 INJECTION, SOLUTION INTRAVENOUS at 12:28:00

## 2022-10-02 ENCOUNTER — LAB ENCOUNTER (OUTPATIENT)
Dept: LAB | Facility: HOSPITAL | Age: 72
End: 2022-10-02
Attending: NURSE PRACTITIONER
Payer: MEDICARE

## 2022-10-02 PROCEDURE — 83993 ASSAY FOR CALPROTECTIN FECAL: CPT | Performed by: NURSE PRACTITIONER

## 2022-10-17 ENCOUNTER — HOSPITAL ENCOUNTER (OUTPATIENT)
Dept: BONE DENSITY | Age: 72
Discharge: HOME | End: 2022-10-17
Attending: NURSE PRACTITIONER
Payer: MEDICARE

## 2022-10-17 DIAGNOSIS — K50.10 CROHN'S DISEASE OF LARGE INTESTINE WITHOUT COMPLICATION (HCC): ICD-10-CM

## 2022-10-17 DIAGNOSIS — D50.9 IRON DEFICIENCY ANEMIA, UNSPECIFIED IRON DEFICIENCY ANEMIA TYPE: ICD-10-CM

## 2022-10-17 DIAGNOSIS — M81.8 OTHER OSTEOPOROSIS WITHOUT CURRENT PATHOLOGICAL FRACTURE: ICD-10-CM

## 2022-10-17 PROCEDURE — 77080 DXA BONE DENSITY AXIAL: CPT | Performed by: NURSE PRACTITIONER

## 2022-11-04 ENCOUNTER — LAB ENCOUNTER (OUTPATIENT)
Dept: LAB | Facility: HOSPITAL | Age: 72
End: 2022-11-04
Attending: NURSE PRACTITIONER
Payer: MEDICARE

## 2022-11-04 DIAGNOSIS — K50.10 CROHN'S DISEASE OF LARGE INTESTINE WITHOUT COMPLICATION (HCC): ICD-10-CM

## 2022-11-04 DIAGNOSIS — E53.8 DEFICIENCY OF OTHER SPECIFIED B GROUP VITAMINS: ICD-10-CM

## 2022-11-04 DIAGNOSIS — D50.9 IRON DEFICIENCY ANEMIA, UNSPECIFIED: ICD-10-CM

## 2022-11-04 DIAGNOSIS — E55.9 VITAMIN D DEFICIENCY: ICD-10-CM

## 2022-11-04 DIAGNOSIS — K50.90 CROHN DISEASE (HCC): ICD-10-CM

## 2022-11-04 DIAGNOSIS — Z79.899 MEDICATION MANAGEMENT: ICD-10-CM

## 2022-11-04 DIAGNOSIS — N40.0 BPH (BENIGN PROSTATIC HYPERPLASIA): ICD-10-CM

## 2022-11-04 DIAGNOSIS — E53.8 VITAMIN B 12 DEFICIENCY: Primary | ICD-10-CM

## 2022-11-04 LAB
ALBUMIN SERPL-MCNC: 3.1 G/DL (ref 3.4–5)
ALBUMIN/GLOB SERPL: 0.9 {RATIO} (ref 1–2)
ALP LIVER SERPL-CCNC: 61 U/L
ALT SERPL-CCNC: 46 U/L
ANION GAP SERPL CALC-SCNC: 0 MMOL/L (ref 0–18)
AST SERPL-CCNC: 32 U/L (ref 15–37)
BASOPHILS # BLD AUTO: 0.03 X10(3) UL (ref 0–0.2)
BASOPHILS NFR BLD AUTO: 0.5 %
BILIRUB SERPL-MCNC: 0.4 MG/DL (ref 0.1–2)
BUN BLD-MCNC: 23 MG/DL (ref 7–18)
CALCIUM BLD-MCNC: 8.6 MG/DL (ref 8.5–10.1)
CHLORIDE SERPL-SCNC: 116 MMOL/L (ref 98–112)
CHOLEST SERPL-MCNC: 125 MG/DL (ref ?–200)
CO2 SERPL-SCNC: 26 MMOL/L (ref 21–32)
CREAT BLD-MCNC: 1.27 MG/DL
DEPRECATED HBV CORE AB SER IA-ACNC: 40.3 NG/ML
EOSINOPHIL # BLD AUTO: 0.29 X10(3) UL (ref 0–0.7)
EOSINOPHIL NFR BLD AUTO: 4.8 %
ERYTHROCYTE [DISTWIDTH] IN BLOOD BY AUTOMATED COUNT: 14.5 %
ERYTHROCYTE [SEDIMENTATION RATE] IN BLOOD: 10 MM/HR
FASTING PATIENT LIPID ANSWER: YES
FASTING STATUS PATIENT QL REPORTED: YES
FOLATE SERPL-MCNC: 99.8 NG/ML (ref 8.7–?)
GFR SERPLBLD BASED ON 1.73 SQ M-ARVRAT: 59 ML/MIN/1.73M2 (ref 60–?)
GLOBULIN PLAS-MCNC: 3.3 G/DL (ref 2.8–4.4)
GLUCOSE BLD-MCNC: 94 MG/DL (ref 70–99)
HCT VFR BLD AUTO: 35.6 %
HDLC SERPL-MCNC: 34 MG/DL (ref 40–59)
HGB BLD-MCNC: 11.8 G/DL
IMM GRANULOCYTES # BLD AUTO: 0.01 X10(3) UL (ref 0–1)
IMM GRANULOCYTES NFR BLD: 0.2 %
IRON SATN MFR SERPL: 16 %
IRON SERPL-MCNC: 62 UG/DL
LDLC SERPL CALC-MCNC: 74 MG/DL (ref ?–100)
LYMPHOCYTES # BLD AUTO: 1.53 X10(3) UL (ref 1–4)
LYMPHOCYTES NFR BLD AUTO: 25.4 %
MCH RBC QN AUTO: 32.2 PG (ref 26–34)
MCHC RBC AUTO-ENTMCNC: 33.1 G/DL (ref 31–37)
MCV RBC AUTO: 97.3 FL
MONOCYTES # BLD AUTO: 0.55 X10(3) UL (ref 0.1–1)
MONOCYTES NFR BLD AUTO: 9.1 %
NEUTROPHILS # BLD AUTO: 3.62 X10 (3) UL (ref 1.5–7.7)
NEUTROPHILS # BLD AUTO: 3.62 X10(3) UL (ref 1.5–7.7)
NEUTROPHILS NFR BLD AUTO: 60 %
NONHDLC SERPL-MCNC: 91 MG/DL (ref ?–130)
OSMOLALITY SERPL CALC.SUM OF ELEC: 297 MOSM/KG (ref 275–295)
PLATELET # BLD AUTO: 211 10(3)UL (ref 150–450)
POTASSIUM SERPL-SCNC: 3.8 MMOL/L (ref 3.5–5.1)
PROT SERPL-MCNC: 6.4 G/DL (ref 6.4–8.2)
RBC # BLD AUTO: 3.66 X10(6)UL
SODIUM SERPL-SCNC: 142 MMOL/L (ref 136–145)
T4 FREE SERPL-MCNC: 0.9 NG/DL (ref 0.8–1.7)
TIBC SERPL-MCNC: 396 UG/DL (ref 240–450)
TRANSFERRIN SERPL-MCNC: 266 MG/DL (ref 200–360)
TRIGL SERPL-MCNC: 88 MG/DL (ref 30–149)
TSI SER-ACNC: 2.78 MIU/ML (ref 0.36–3.74)
URATE SERPL-MCNC: 6 MG/DL
VIT B12 SERPL-MCNC: 918 PG/ML (ref 193–986)
VIT D+METAB SERPL-MCNC: 34.9 NG/ML (ref 30–100)
VLDLC SERPL CALC-MCNC: 14 MG/DL (ref 0–30)
WBC # BLD AUTO: 6 X10(3) UL (ref 4–11)

## 2022-11-04 PROCEDURE — 84590 ASSAY OF VITAMIN A: CPT

## 2022-11-04 PROCEDURE — 84439 ASSAY OF FREE THYROXINE: CPT

## 2022-11-04 PROCEDURE — 84597 ASSAY OF VITAMIN K: CPT

## 2022-11-04 PROCEDURE — 83550 IRON BINDING TEST: CPT

## 2022-11-04 PROCEDURE — 80061 LIPID PANEL: CPT

## 2022-11-04 PROCEDURE — 85025 COMPLETE CBC W/AUTO DIFF WBC: CPT

## 2022-11-04 PROCEDURE — 83540 ASSAY OF IRON: CPT

## 2022-11-04 PROCEDURE — 82306 VITAMIN D 25 HYDROXY: CPT

## 2022-11-04 PROCEDURE — 82746 ASSAY OF FOLIC ACID SERUM: CPT

## 2022-11-04 PROCEDURE — 84443 ASSAY THYROID STIM HORMONE: CPT

## 2022-11-04 PROCEDURE — 80053 COMPREHEN METABOLIC PANEL: CPT

## 2022-11-04 PROCEDURE — 84446 ASSAY OF VITAMIN E: CPT

## 2022-11-04 PROCEDURE — 85652 RBC SED RATE AUTOMATED: CPT

## 2022-11-04 PROCEDURE — 36415 COLL VENOUS BLD VENIPUNCTURE: CPT

## 2022-11-04 PROCEDURE — 82728 ASSAY OF FERRITIN: CPT

## 2022-11-04 PROCEDURE — 82607 VITAMIN B-12: CPT

## 2022-11-04 PROCEDURE — 84550 ASSAY OF BLOOD/URIC ACID: CPT

## 2022-11-07 LAB
ALPHA-TOCOPHEROL (VIT E) -MG/L: 10 MG/L
GAMMA-TOCOPHEROL (VIT E) -MG/L: 1 MG/L
INTERPRETATION VIT A, SER/PLA: NORMAL
RETINYL PALMITATE: <0.02 MG/L
VITAMIN A (RETINOL): 0.8 MG/L

## 2022-11-08 LAB — VITAMIN K1, SERUM: 1.18 NMOL/L

## 2023-03-14 ENCOUNTER — OFFICE VISIT (OUTPATIENT)
Dept: RHEUMATOLOGY | Facility: CLINIC | Age: 73
End: 2023-03-14
Payer: MEDICARE

## 2023-03-14 VITALS
TEMPERATURE: 98 F | HEIGHT: 67 IN | RESPIRATION RATE: 16 BRPM | WEIGHT: 143 LBS | SYSTOLIC BLOOD PRESSURE: 116 MMHG | OXYGEN SATURATION: 100 % | BODY MASS INDEX: 22.44 KG/M2 | HEART RATE: 60 BPM | DIASTOLIC BLOOD PRESSURE: 60 MMHG

## 2023-03-14 DIAGNOSIS — R76.8 POSITIVE ANA (ANTINUCLEAR ANTIBODY): ICD-10-CM

## 2023-03-14 DIAGNOSIS — K50.019 CROHN'S DISEASE OF ILEUM WITH COMPLICATION (HCC): ICD-10-CM

## 2023-03-14 DIAGNOSIS — M81.0 AGE-RELATED OSTEOPOROSIS WITHOUT CURRENT PATHOLOGICAL FRACTURE: Primary | ICD-10-CM

## 2023-03-14 PROCEDURE — 99205 OFFICE O/P NEW HI 60 MIN: CPT | Performed by: INTERNAL MEDICINE

## 2023-03-18 ENCOUNTER — OFFICE VISIT (OUTPATIENT)
Dept: FAMILY MEDICINE CLINIC | Facility: CLINIC | Age: 73
End: 2023-03-18
Payer: MEDICARE

## 2023-03-18 ENCOUNTER — TELEMEDICINE (OUTPATIENT)
Dept: TELEHEALTH | Age: 73
End: 2023-03-18
Payer: MEDICARE

## 2023-03-18 VITALS
SYSTOLIC BLOOD PRESSURE: 114 MMHG | HEIGHT: 67 IN | OXYGEN SATURATION: 99 % | WEIGHT: 145 LBS | HEART RATE: 62 BPM | RESPIRATION RATE: 14 BRPM | BODY MASS INDEX: 22.76 KG/M2 | DIASTOLIC BLOOD PRESSURE: 60 MMHG | TEMPERATURE: 98 F

## 2023-03-18 DIAGNOSIS — Z02.9 ADMINISTRATIVE ENCOUNTER: Primary | ICD-10-CM

## 2023-03-18 DIAGNOSIS — N50.89 PAIN OF MALE GENITALIA: ICD-10-CM

## 2023-03-18 DIAGNOSIS — R39.9 UTI SYMPTOMS: Primary | ICD-10-CM

## 2023-03-18 LAB
APPEARANCE: CLEAR
BILIRUBIN: NEGATIVE
GLUCOSE (URINE DIPSTICK): NEGATIVE MG/DL
KETONES (URINE DIPSTICK): NEGATIVE MG/DL
LEUKOCYTES: NEGATIVE
MULTISTIX LOT#: NORMAL NUMERIC
NITRITE, URINE: NEGATIVE
OCCULT BLOOD: NEGATIVE
PH, URINE: 5.5 (ref 4.5–8)
PROTEIN (URINE DIPSTICK): NEGATIVE MG/DL
SPECIFIC GRAVITY: 1.02 (ref 1–1.03)
URINE-COLOR: YELLOW
UROBILINOGEN,SEMI-QN: 0.2 MG/DL (ref 0–1.9)

## 2023-03-18 PROCEDURE — 87086 URINE CULTURE/COLONY COUNT: CPT | Performed by: PHYSICIAN ASSISTANT

## 2023-03-18 NOTE — PROGRESS NOTES
Oriana Barrera is a 74year old male who submitted a video visit. Sent notification to connect x 2 but no connection made. Contacted pt by phone instead. Pt reports urinary urgency, frequency, and low level discomfort at genital area - states it feels more like an irritation. Denies flank pain, fever, hematuria, nausea, or vomiting. Denies penile discharge; no concern for STI risk. Other  hx: reports hx of UTIs with similar symptoms in the past but not in past few years    After triage, a higher acuity of care was recommended to pt. Discussed limitations of telephone visit and need for further assessment. Will not arrive at IC prior to closing. Referred to [facility] but pt advised provider may determine his genital discomfort/irritation needs higher level of care and pt would be referred to ED. Patient verbalized understanding of rationale for further evaluation and sounded stable upon discharge.

## 2023-03-18 NOTE — PATIENT INSTRUCTIONS
Fluids   Will call or mychart with culture results   Please go to ED for any worsening symptoms- pain, fever, inability to urinate   Close follow up with PCP or urology.  Please discuss PSA monitoring with PCP as no results are in epic records

## 2023-05-01 ENCOUNTER — LAB ENCOUNTER (OUTPATIENT)
Dept: LAB | Facility: HOSPITAL | Age: 73
End: 2023-05-01
Attending: INTERNAL MEDICINE
Payer: MEDICARE

## 2023-05-01 DIAGNOSIS — E55.9 VITAMIN D DEFICIENCY: ICD-10-CM

## 2023-05-01 DIAGNOSIS — R61 GENERALIZED HYPERHIDROSIS: ICD-10-CM

## 2023-05-01 DIAGNOSIS — D64.9 ANEMIA, UNSPECIFIED: Primary | ICD-10-CM

## 2023-05-01 DIAGNOSIS — Z12.5 ENCOUNTER FOR SCREENING FOR MALIGNANT NEOPLASM OF PROSTATE: ICD-10-CM

## 2023-05-01 LAB
ALBUMIN SERPL-MCNC: 3.3 G/DL (ref 3.4–5)
ALBUMIN/GLOB SERPL: 0.9 {RATIO} (ref 1–2)
ALP LIVER SERPL-CCNC: 63 U/L
ALT SERPL-CCNC: 45 U/L
ANION GAP SERPL CALC-SCNC: 0 MMOL/L (ref 0–18)
AST SERPL-CCNC: 38 U/L (ref 15–37)
BASOPHILS # BLD AUTO: 0.04 X10(3) UL (ref 0–0.2)
BASOPHILS NFR BLD AUTO: 0.7 %
BILIRUB SERPL-MCNC: 0.4 MG/DL (ref 0.1–2)
BUN BLD-MCNC: 27 MG/DL (ref 7–18)
CALCIUM BLD-MCNC: 8.8 MG/DL (ref 8.5–10.1)
CHLORIDE SERPL-SCNC: 112 MMOL/L (ref 98–112)
CO2 SERPL-SCNC: 27 MMOL/L (ref 21–32)
COMPLEXED PSA SERPL-MCNC: 0.9 NG/ML (ref ?–4)
CREAT BLD-MCNC: 1.21 MG/DL
DEPRECATED HBV CORE AB SER IA-ACNC: 49 NG/ML
EOSINOPHIL # BLD AUTO: 0.18 X10(3) UL (ref 0–0.7)
EOSINOPHIL NFR BLD AUTO: 3 %
ERYTHROCYTE [DISTWIDTH] IN BLOOD BY AUTOMATED COUNT: 14.4 %
FASTING STATUS PATIENT QL REPORTED: NO
FOLATE SERPL-MCNC: 91.2 NG/ML (ref 8.7–?)
GFR SERPLBLD BASED ON 1.73 SQ M-ARVRAT: 63 ML/MIN/1.73M2 (ref 60–?)
GLOBULIN PLAS-MCNC: 3.7 G/DL (ref 2.8–4.4)
GLUCOSE BLD-MCNC: 90 MG/DL (ref 70–99)
HCT VFR BLD AUTO: 36 %
HGB BLD-MCNC: 11.8 G/DL
IMM GRANULOCYTES # BLD AUTO: 0.02 X10(3) UL (ref 0–1)
IMM GRANULOCYTES NFR BLD: 0.3 %
IRON SATN MFR SERPL: 21 %
IRON SERPL-MCNC: 87 UG/DL
LYMPHOCYTES # BLD AUTO: 1.62 X10(3) UL (ref 1–4)
LYMPHOCYTES NFR BLD AUTO: 26.8 %
MCH RBC QN AUTO: 31 PG (ref 26–34)
MCHC RBC AUTO-ENTMCNC: 32.8 G/DL (ref 31–37)
MCV RBC AUTO: 94.5 FL
MONOCYTES # BLD AUTO: 0.52 X10(3) UL (ref 0.1–1)
MONOCYTES NFR BLD AUTO: 8.6 %
NEUTROPHILS # BLD AUTO: 3.66 X10 (3) UL (ref 1.5–7.7)
NEUTROPHILS # BLD AUTO: 3.66 X10(3) UL (ref 1.5–7.7)
NEUTROPHILS NFR BLD AUTO: 60.6 %
OSMOLALITY SERPL CALC.SUM OF ELEC: 293 MOSM/KG (ref 275–295)
PLATELET # BLD AUTO: 211 10(3)UL (ref 150–450)
POTASSIUM SERPL-SCNC: 3.9 MMOL/L (ref 3.5–5.1)
PROT SERPL-MCNC: 7 G/DL (ref 6.4–8.2)
RBC # BLD AUTO: 3.81 X10(6)UL
SODIUM SERPL-SCNC: 139 MMOL/L (ref 136–145)
TIBC SERPL-MCNC: 410 UG/DL (ref 240–450)
TRANSFERRIN SERPL-MCNC: 275 MG/DL (ref 200–360)
VIT B12 SERPL-MCNC: 1013 PG/ML (ref 193–986)
VIT D+METAB SERPL-MCNC: 75.7 NG/ML (ref 30–100)
WBC # BLD AUTO: 6 X10(3) UL (ref 4–11)

## 2023-05-01 PROCEDURE — 83550 IRON BINDING TEST: CPT

## 2023-05-01 PROCEDURE — 84403 ASSAY OF TOTAL TESTOSTERONE: CPT

## 2023-05-01 PROCEDURE — 85025 COMPLETE CBC W/AUTO DIFF WBC: CPT

## 2023-05-01 PROCEDURE — 82746 ASSAY OF FOLIC ACID SERUM: CPT

## 2023-05-01 PROCEDURE — 82728 ASSAY OF FERRITIN: CPT

## 2023-05-01 PROCEDURE — 36415 COLL VENOUS BLD VENIPUNCTURE: CPT

## 2023-05-01 PROCEDURE — 84402 ASSAY OF FREE TESTOSTERONE: CPT

## 2023-05-01 PROCEDURE — 82306 VITAMIN D 25 HYDROXY: CPT

## 2023-05-01 PROCEDURE — 83540 ASSAY OF IRON: CPT

## 2023-05-01 PROCEDURE — 82607 VITAMIN B-12: CPT

## 2023-05-01 PROCEDURE — 80053 COMPREHEN METABOLIC PANEL: CPT

## 2023-05-03 ENCOUNTER — HOSPITAL ENCOUNTER (OUTPATIENT)
Dept: CT IMAGING | Age: 73
Discharge: HOME | End: 2023-05-03
Attending: INTERNAL MEDICINE
Payer: MEDICARE

## 2023-05-03 DIAGNOSIS — R22.2 NODULE OF CHEST WALL: ICD-10-CM

## 2023-05-03 PROCEDURE — 71260 CT THORAX DX C+: CPT | Performed by: INTERNAL MEDICINE

## 2023-05-06 LAB
% FREE TESTOST: 1.24 %
TESTOST FREE: 5.42 NG/DL
TESTOSTERONE TOT /MS: 436.9 NG/DL

## 2023-08-31 ENCOUNTER — HOSPITAL ENCOUNTER (OUTPATIENT)
Dept: GENERAL RADIOLOGY | Facility: HOSPITAL | Age: 73
Discharge: HOME | End: 2023-08-31
Attending: CHIROPRACTOR
Payer: MEDICARE

## 2023-08-31 DIAGNOSIS — M51.26 LUMBAR DISC HERNIATION: ICD-10-CM

## 2023-08-31 PROCEDURE — 72100 X-RAY EXAM L-S SPINE 2/3 VWS: CPT | Performed by: CHIROPRACTOR

## 2024-01-10 ENCOUNTER — LAB ENCOUNTER (OUTPATIENT)
Dept: LAB | Facility: HOSPITAL | Age: 74
End: 2024-01-10
Attending: INTERNAL MEDICINE
Payer: MEDICARE

## 2024-01-10 ENCOUNTER — HOSPITAL ENCOUNTER (OUTPATIENT)
Dept: CT IMAGING | Facility: HOSPITAL | Age: 74
Discharge: HOME | End: 2024-01-10
Attending: INTERNAL MEDICINE
Payer: MEDICARE

## 2024-01-10 DIAGNOSIS — D64.9 ANEMIA, UNSPECIFIED: Primary | ICD-10-CM

## 2024-01-10 DIAGNOSIS — J47.9 BRONCHIECTASIS WITHOUT COMPLICATION (HCC): ICD-10-CM

## 2024-01-10 DIAGNOSIS — R91.1 LUNG NODULE: ICD-10-CM

## 2024-01-10 LAB
BASOPHILS # BLD AUTO: 0.03 X10(3) UL (ref 0–0.2)
BASOPHILS NFR BLD AUTO: 0.4 %
DEPRECATED HBV CORE AB SER IA-ACNC: 77.3 NG/ML
EOSINOPHIL # BLD AUTO: 0.25 X10(3) UL (ref 0–0.7)
EOSINOPHIL NFR BLD AUTO: 3.5 %
ERYTHROCYTE [DISTWIDTH] IN BLOOD BY AUTOMATED COUNT: 14.6 %
FOLATE SERPL-MCNC: 96.7 NG/ML (ref 8.7–?)
HCT VFR BLD AUTO: 34.6 %
HGB BLD-MCNC: 11.3 G/DL
HGB RETIC QN AUTO: 35.6 PG (ref 28.2–36.6)
IMM GRANULOCYTES # BLD AUTO: 0.01 X10(3) UL (ref 0–1)
IMM GRANULOCYTES NFR BLD: 0.1 %
IMM RETICS NFR: 0.1 RATIO (ref 0.1–0.3)
IRON SATN MFR SERPL: 22 %
IRON SERPL-MCNC: 81 UG/DL
LYMPHOCYTES # BLD AUTO: 1.52 X10(3) UL (ref 1–4)
LYMPHOCYTES NFR BLD AUTO: 21 %
MCH RBC QN AUTO: 31.4 PG (ref 26–34)
MCHC RBC AUTO-ENTMCNC: 32.7 G/DL (ref 31–37)
MCV RBC AUTO: 96.1 FL
MONOCYTES # BLD AUTO: 0.73 X10(3) UL (ref 0.1–1)
MONOCYTES NFR BLD AUTO: 10.1 %
NEUTROPHILS # BLD AUTO: 4.69 X10 (3) UL (ref 1.5–7.7)
NEUTROPHILS # BLD AUTO: 4.69 X10(3) UL (ref 1.5–7.7)
NEUTROPHILS NFR BLD AUTO: 64.9 %
PLATELET # BLD AUTO: 233 10(3)UL (ref 150–450)
RBC # BLD AUTO: 3.6 X10(6)UL
RETICS # AUTO: 42.5 X10(3) UL (ref 22.5–147.5)
RETICS/RBC NFR AUTO: 1.2 %
TIBC SERPL-MCNC: 365 UG/DL (ref 240–450)
TRANSFERRIN SERPL-MCNC: 245 MG/DL (ref 200–360)
TSI SER-ACNC: 1.81 MIU/ML (ref 0.36–3.74)
VIT B12 SERPL-MCNC: 997 PG/ML (ref 193–986)
WBC # BLD AUTO: 7.2 X10(3) UL (ref 4–11)

## 2024-01-10 PROCEDURE — 82746 ASSAY OF FOLIC ACID SERUM: CPT

## 2024-01-10 PROCEDURE — 71250 CT THORAX DX C-: CPT | Performed by: INTERNAL MEDICINE

## 2024-01-10 PROCEDURE — 85025 COMPLETE CBC W/AUTO DIFF WBC: CPT

## 2024-01-10 PROCEDURE — 83550 IRON BINDING TEST: CPT

## 2024-01-10 PROCEDURE — 82607 VITAMIN B-12: CPT

## 2024-01-10 PROCEDURE — 36415 COLL VENOUS BLD VENIPUNCTURE: CPT

## 2024-01-10 PROCEDURE — 84443 ASSAY THYROID STIM HORMONE: CPT

## 2024-01-10 PROCEDURE — 85045 AUTOMATED RETICULOCYTE COUNT: CPT

## 2024-01-10 PROCEDURE — 82728 ASSAY OF FERRITIN: CPT

## 2024-01-10 PROCEDURE — 83540 ASSAY OF IRON: CPT

## 2024-04-24 ENCOUNTER — TELEPHONE (OUTPATIENT)
Dept: HEMATOLOGY/ONCOLOGY | Facility: HOSPITAL | Age: 74
End: 2024-04-24

## 2024-05-17 ENCOUNTER — OFFICE VISIT (OUTPATIENT)
Dept: HEMATOLOGY/ONCOLOGY | Facility: HOSPITAL | Age: 74
End: 2024-05-17
Attending: INTERNAL MEDICINE
Payer: MEDICARE

## 2024-05-17 VITALS
SYSTOLIC BLOOD PRESSURE: 99 MMHG | WEIGHT: 135.38 LBS | HEART RATE: 58 BPM | TEMPERATURE: 97 F | BODY MASS INDEX: 21 KG/M2 | RESPIRATION RATE: 18 BRPM | DIASTOLIC BLOOD PRESSURE: 62 MMHG | OXYGEN SATURATION: 98 %

## 2024-05-17 DIAGNOSIS — D64.9 ANEMIA, UNSPECIFIED TYPE: Primary | ICD-10-CM

## 2024-05-17 DIAGNOSIS — R74.8 ABNORMAL LEVELS OF OTHER SERUM ENZYMES: ICD-10-CM

## 2024-05-17 LAB
ALBUMIN SERPL-MCNC: 3.6 G/DL (ref 3.4–5)
ALBUMIN/GLOB SERPL: 0.9 {RATIO} (ref 1–2)
ALP LIVER SERPL-CCNC: 67 U/L
ALT SERPL-CCNC: 46 U/L
ANION GAP SERPL CALC-SCNC: 5 MMOL/L (ref 0–18)
AST SERPL-CCNC: 43 U/L (ref 15–37)
BASOPHILS # BLD AUTO: 0.04 X10(3) UL (ref 0–0.2)
BASOPHILS NFR BLD AUTO: 0.7 %
BILIRUB SERPL-MCNC: 0.4 MG/DL (ref 0.1–2)
BUN BLD-MCNC: 31 MG/DL (ref 9–23)
CALCIUM BLD-MCNC: 9 MG/DL (ref 8.5–10.1)
CHLORIDE SERPL-SCNC: 110 MMOL/L (ref 98–112)
CO2 SERPL-SCNC: 22 MMOL/L (ref 21–32)
CREAT BLD-MCNC: 1.46 MG/DL
DEPRECATED HBV CORE AB SER IA-ACNC: 83.5 NG/ML
EGFRCR SERPLBLD CKD-EPI 2021: 50 ML/MIN/1.73M2 (ref 60–?)
EOSINOPHIL # BLD AUTO: 0.15 X10(3) UL (ref 0–0.7)
EOSINOPHIL NFR BLD AUTO: 2.5 %
ERYTHROCYTE [DISTWIDTH] IN BLOOD BY AUTOMATED COUNT: 14.6 %
FASTING STATUS PATIENT QL REPORTED: NO
GLOBULIN PLAS-MCNC: 4 G/DL (ref 2.8–4.4)
GLUCOSE BLD-MCNC: 87 MG/DL (ref 70–99)
HCT VFR BLD AUTO: 37.4 %
HGB BLD-MCNC: 12.2 G/DL
HGB RETIC QN AUTO: 35.7 PG (ref 28.2–36.6)
IMM GRANULOCYTES # BLD AUTO: 0.01 X10(3) UL (ref 0–1)
IMM GRANULOCYTES NFR BLD: 0.2 %
IMM RETICS NFR: 0.09 RATIO (ref 0.1–0.3)
IRON SATN MFR SERPL: 16 %
IRON SERPL-MCNC: 60 UG/DL
LYMPHOCYTES # BLD AUTO: 1.45 X10(3) UL (ref 1–4)
LYMPHOCYTES NFR BLD AUTO: 23.8 %
MCH RBC QN AUTO: 31.6 PG (ref 26–34)
MCHC RBC AUTO-ENTMCNC: 32.6 G/DL (ref 31–37)
MCV RBC AUTO: 96.9 FL
MONOCYTES # BLD AUTO: 0.53 X10(3) UL (ref 0.1–1)
MONOCYTES NFR BLD AUTO: 8.7 %
NEUTROPHILS # BLD AUTO: 3.9 X10 (3) UL (ref 1.5–7.7)
NEUTROPHILS # BLD AUTO: 3.9 X10(3) UL (ref 1.5–7.7)
NEUTROPHILS NFR BLD AUTO: 64.1 %
OSMOLALITY SERPL CALC.SUM OF ELEC: 290 MOSM/KG (ref 275–295)
PLATELET # BLD AUTO: 193 10(3)UL (ref 150–450)
POTASSIUM SERPL-SCNC: 4.2 MMOL/L (ref 3.5–5.1)
PROT SERPL-MCNC: 7.6 G/DL (ref 6.4–8.2)
RBC # BLD AUTO: 3.86 X10(6)UL
RETICS # AUTO: 31.7 X10(3) UL (ref 22.5–147.5)
RETICS/RBC NFR AUTO: 0.8 %
SODIUM SERPL-SCNC: 137 MMOL/L (ref 136–145)
TIBC SERPL-MCNC: 375 UG/DL (ref 240–450)
TRANSFERRIN SERPL-MCNC: 252 MG/DL (ref 200–360)
TSI SER-ACNC: 2.67 MIU/ML (ref 0.36–3.74)
VIT B12 SERPL-MCNC: 1728 PG/ML (ref 193–986)
WBC # BLD AUTO: 6.1 X10(3) UL (ref 4–11)

## 2024-05-17 PROCEDURE — 99205 OFFICE O/P NEW HI 60 MIN: CPT | Performed by: INTERNAL MEDICINE

## 2024-05-17 NOTE — CONSULTS
Cancer Center Report of Consultation    Patient Name: Edy Deshpande III   YOB: 1950   Medical Record Number: [id number]   CSN: [id number]   Consulting Physician: Kraig Gonzales M.D.   Referring Physician: No ref. provider found    Date of Consultation: 5/17/2024     Reason for Consultation (Chief Complaint):  Chief Complaint   Patient presents with    Consult        History of Present Illness:  Edy Deshpande III is a 73 year old male referred for evaluation of anemia. The patient reports a long history of chrons disease. He was seen by pulmonology in January, 2024, who ordered a CBC, Iron studies, B12 and Folate. The CBC revealed a hgb of 11.3. In retrospect, his hgb has been decreased since Oct, 2022. Iron, B12 and Folate were normal at the time.   The patient reports that he feels tired, fighting the urge to take a nap in the afternoon.   No bleeding.  No F/C/NS. No palpable LAD.     He sees a chiropractic  and has started a group of supplements.     Past Medical History:  Past Medical History:    Abdominal hernia    not sure    Abdominal pain    had a recent occurrence    Anemia    not severe, but continues    Back pain    lower back- felt a pop in back    Belching    minor    Cancer (HCC)    skin face- MOHS    Constipation    Crohn disease (HCC)    Diarrhea, unspecified    Fatigue    occasional-late afternoon    Flatulence/gas pain/belching    Food intolerance    lactose intolerant    Frequent urination    Heartburn    Hemorrhoids    very mild    High cholesterol    History of COVID-19    2 day fever, fatigue, not hospitalized    Irregular bowel habits    Leaking of urine    minor amount    Mouth sores    rare    Nausea    had a recent occurrence    Night sweats    Osteoporosis    osteoperosis    Problems with swallowing    no longer a problem    Stool incontinence    minor and infrequent    Visual impairment    glasses    Vomiting    had a recent occurrence    Wears glasses        Past Surgical History:  Past Surgical History:   Procedure Laterality Date    Bowel resection  1977    Colectomy  1977    Colonoscopy  2012    Colonoscopy N/A 4/15/2022    Procedure: COLONOSCOPY;  Surgeon: Juan Antonio Tapia MD;  Location:  ENDOSCOPY    Other      Abdominal surgery 1977    Sigmoidoscopy,diagnostic  20 years ago?    Tonsillectomy  1955       Gynecologic History:      Family History:  Family History   Problem Relation Age of Onset    Colon Cancer Maternal Uncle         Cody    Crohn's Disease Sister         Bettina    Diabetes Brother         Charlee    Hypertension Brother         Sell    Heart Attack Mother         Sell    Stroke Father         Sell       Social History:  Social History     Socioeconomic History    Marital status:      Spouse name: Not on file    Number of children: Not on file    Years of education: Not on file    Highest education level: Not on file   Occupational History    Not on file   Tobacco Use    Smoking status: Never    Smokeless tobacco: Never   Vaping Use    Vaping status: Never Used   Substance and Sexual Activity    Alcohol use: Yes     Alcohol/week: 1.0 standard drink of alcohol     Types: 1 Glasses of wine per week    Drug use: Never    Sexual activity: Not on file   Other Topics Concern    Not on file   Social History Narrative    Not on file     Social Determinants of Health     Financial Resource Strain: Not on file   Food Insecurity: Not on file   Transportation Needs: Not on file   Physical Activity: Not on file   Stress: Not on file   Social Connections: Not on file   Housing Stability: Not on file       Current Medications:    Current Outpatient Medications:     Turmeric 500 MG Oral Cap, Take by mouth daily., Disp: , Rfl:     B Complex Vitamins (VITAMIN B COMPLEX OR), Take by mouth As Directed., Disp: , Rfl:     Ammonium Lactate (LAC-HYDRIN) 12 % External Cream, Apply to feet twice daily, Disp: 280 g, Rfl: 2    Vitamin C 500 MG Oral Tab, Take 1  tablet (500 mg total) by mouth daily., Disp: , Rfl:     Omega-3 Fatty Acids (FISH OIL) 500 MG Oral Cap, Take by mouth., Disp: , Rfl:     multivitamin Oral Tab, Take 1 tablet by mouth daily., Disp: , Rfl:     Cholecalciferol (VITAMIN D-3) 5000 UNITS Oral Tab, Take 1 tablet (5,000 Units total) by mouth., Disp: , Rfl:     Allergies:  Allergies   Allergen Reactions    Latex ITCHING        Review of Systems:    Constitutional No fevers, chills, night sweats, excessive fatigue or weight loss.   Eyes No significant visual difficulties. No diplopia. No yellowing of the eyes.   Hematologic/Lymphatic No easy bruising or bleeding.  No any tender or palpable lymph nodes.   Respiratory No dyspnea, Pleuritic chest pain, cough or hemoptysis.   Cardiovascular No anginal chest pain, palpitations or orthopnea.   Gastrointestinal No nausea, vomiting, diarrhea, GI bleeding, or constipation.   Genitorurinary (M) No hematuria, dysuria, or incontinence. No abnormal bleeding.   Integumentary No rashes or yellowing of the skin   Neurologic No headache, blurred vision, and no areas of focal weakness. Normal gait.   Psychiatric No insomnia, depression, katie or mood swings.         Vital Signs:  BP 99/62 (BP Location: Left arm, Patient Position: Sitting, Cuff Size: adult)   Pulse 58   Temp 96.9 °F (36.1 °C) (Temporal)   Resp 18   Wt 61.4 kg (135 lb 6.4 oz)   SpO2 98%   BMI 20.89 kg/m²     Performance Status:  ECOG 0    Physical Examination:    Constitutional Normal - Alert, cooperative, oriented. Mood and affect appropriate. Appears close to chronological age. Well nourished. Well developed.   Eyes Normal - Conjunctivae and sclerae are clear and without icterus. Pupils are reactive and equal.   ENMT Normal - Sinuses are nontender.  No oral exudates, ulcers, masses, thrush or mucositis. Oropharynx clear.  Tongue normal.   Neck Normal - Supple without masses or thyromegaly.   Hematologic/Lymphatic Normal - No petechiae or purpura.  No  tender or palpable lymph nodes in the cervical, supraclavicular, axillary or inguinal area.   Respiratory Normal - Lungs are clear to auscultation without rhonchi or wheezing.   Cardiovascular Normal - Regular rate and rhythm of heart without murmurs, gallops or rubs.   Abdomen Normal - Non-tender, non-distended, no masses, ascites or hepatosplenomegaly. Good bowel sounds. No guarding or rebound tenderness. No pulsatile masses.   Extremities Normal - No visible deformities, no cyanosis, clubbing or edema. Pulses 4+ and equal bilaterally.   Integumentary Normal - No rashes, scars, or lesions suggestive of malignancy.   Neurologic Normal - No sensory or motor deficits, normal cerebellar function, normal gait, cranial nerves intact.   Psychiatric Normal - Alert and oriented times three. Coherent speech. Verbalizes understanding of our discussions today.       Laboratory:      Latest Reference Range & Units 01/10/24 14:38   WBC 4.0 - 11.0 x10(3) uL 7.2   Hemoglobin 13.0 - 17.5 g/dL 11.3 (L)   Hematocrit 39.0 - 53.0 % 34.6 (L)   Platelet Count 150.0 - 450.0 10(3)uL 233.0   RBC 3.80 - 5.80 x10(6)uL 3.60 (L)   MCH 26.0 - 34.0 pg 31.4   MCHC 31.0 - 37.0 g/dL 32.7   MCV 80.0 - 100.0 fL 96.1   RDW % 14.6   Prelim Neutrophil Abs 1.50 - 7.70 x10 (3) uL 4.69   Neutrophils Absolute 1.50 - 7.70 x10(3) uL 4.69   Lymphocytes Absolute 1.00 - 4.00 x10(3) uL 1.52   Monocytes Absolute 0.10 - 1.00 x10(3) uL 0.73   Eosinophils Absolute 0.00 - 0.70 x10(3) uL 0.25   Basophils Absolute 0.00 - 0.20 x10(3) uL 0.03   Immature Granulocyte Absolute 0.00 - 1.00 x10(3) uL 0.01   Neutrophils % % 64.9   Lymphocytes % % 21.0   Monocytes % % 10.1   Eosinophils % % 3.5   Basophils % % 0.4   Immature Granulocyte % % 0.1   RETIC% 0.5 - 2.5 % 1.2   RETIC ABSOLUTE 22.5 - 147.5 x10(3) uL 42.5   Retic IRF 0.100 - 0.300 Ratio 0.098 (L)   Reticulocyte Hemoglobin Equivalent 28.2 - 36.6 pg 35.6   (L): Data is abnormally low     Latest Reference Range & Units  01/10/24 14:38   TSH 0.358 - 3.740 mIU/mL 1.810      Latest Reference Range & Units 01/10/24 14:38   Iron, Serum 65 - 175 ug/dL 81   Transferrin 200 - 360 mg/dL 245   Iron Bind.Cap.(TIBC) 240 - 450 ug/dL 365   Iron Saturation 20 - 50 % 22   FERRITIN 30.0 - 530.0 ng/mL 77.3   Vitamin B12 193 - 986 pg/mL 997 (H)   FOLATE (FOLIC ACID), SERUM >=8.7 ng/mL 96.7   (H): Data is abnormally high           Radiology:    Pathology:    Impression and Plan:  Anemia: Unclear etiology. In the past he has required B12 injections, but is now on supplements that likely contain B12. His labs in January demonstrated that his B12 stores were replete. At this point, his labs are 5 months old. I recommend repeating a CBC with a retic ct. Recheck Iron, B12 and folate. Check copper levels. Check Epo. If he is anemic, his stores are adequate, and his retic is not adequately elevated, a marrow will be indicated.     Planned Follow Up:  2 weeks      Pre-visit charting and record review: 10 min  Visit time: 45 min  Post-visit chartin min  Total time on the day of service: 60 min      Electronically Signed by:    Kraig Gonzales M.D.  [facility]

## 2024-05-17 NOTE — PROGRESS NOTES
New consult for abnormal labs. Two doctors have told him he has anemia.  He complains of fatigue.    Outpatient Oncology Care Plan  Problem list:  knowledge deficit    Problems related to:    disease/disease progression    Interventions:  provided general teaching    Expected outcomes:  understands plan of care    Progress towards outcome:  making progress    Education Record    Learner:  Patient  Barriers / Limitations:  None  Method:  Brief focused  Outcome:  Shows understanding  Comments:

## 2024-05-20 LAB — ERYTHROPOIETIN: 6 MIU/ML

## 2024-05-21 LAB — COPPER: 84 UG/DL

## 2024-05-31 ENCOUNTER — OFFICE VISIT (OUTPATIENT)
Dept: HEMATOLOGY/ONCOLOGY | Facility: HOSPITAL | Age: 74
End: 2024-05-31
Attending: INTERNAL MEDICINE
Payer: MEDICARE

## 2024-05-31 VITALS
HEIGHT: 67.52 IN | OXYGEN SATURATION: 99 % | TEMPERATURE: 97 F | RESPIRATION RATE: 14 BRPM | SYSTOLIC BLOOD PRESSURE: 104 MMHG | DIASTOLIC BLOOD PRESSURE: 62 MMHG | WEIGHT: 134.38 LBS | BODY MASS INDEX: 20.6 KG/M2 | HEART RATE: 57 BPM

## 2024-05-31 DIAGNOSIS — N17.9 ACUTE RENAL FAILURE, UNSPECIFIED ACUTE RENAL FAILURE TYPE (HCC): ICD-10-CM

## 2024-05-31 DIAGNOSIS — D64.9 ANEMIA, UNSPECIFIED TYPE: Primary | ICD-10-CM

## 2024-05-31 LAB
ALBUMIN SERPL-MCNC: 3.4 G/DL (ref 3.4–5)
ALBUMIN/GLOB SERPL: 1 {RATIO} (ref 1–2)
ALP LIVER SERPL-CCNC: 66 U/L
ALT SERPL-CCNC: 58 U/L
ANION GAP SERPL CALC-SCNC: 7 MMOL/L (ref 0–18)
AST SERPL-CCNC: 43 U/L (ref 15–37)
BASOPHILS # BLD AUTO: 0.04 X10(3) UL (ref 0–0.2)
BASOPHILS NFR BLD AUTO: 0.7 %
BILIRUB SERPL-MCNC: 0.2 MG/DL (ref 0.1–2)
BUN BLD-MCNC: 30 MG/DL (ref 9–23)
CALCIUM BLD-MCNC: 8.8 MG/DL (ref 8.5–10.1)
CHLORIDE SERPL-SCNC: 112 MMOL/L (ref 98–112)
CO2 SERPL-SCNC: 20 MMOL/L (ref 21–32)
CREAT BLD-MCNC: 1.4 MG/DL
EGFRCR SERPLBLD CKD-EPI 2021: 52 ML/MIN/1.73M2 (ref 60–?)
EOSINOPHIL # BLD AUTO: 0.14 X10(3) UL (ref 0–0.7)
EOSINOPHIL NFR BLD AUTO: 2.4 %
ERYTHROCYTE [DISTWIDTH] IN BLOOD BY AUTOMATED COUNT: 15.3 %
GLOBULIN PLAS-MCNC: 3.3 G/DL (ref 2.8–4.4)
GLUCOSE BLD-MCNC: 86 MG/DL (ref 70–99)
HCT VFR BLD AUTO: 33.4 %
HGB BLD-MCNC: 11.3 G/DL
IMM GRANULOCYTES # BLD AUTO: 0.01 X10(3) UL (ref 0–1)
IMM GRANULOCYTES NFR BLD: 0.2 %
LYMPHOCYTES # BLD AUTO: 1.29 X10(3) UL (ref 1–4)
LYMPHOCYTES NFR BLD AUTO: 22.6 %
MCH RBC QN AUTO: 32.2 PG (ref 26–34)
MCHC RBC AUTO-ENTMCNC: 33.8 G/DL (ref 31–37)
MCV RBC AUTO: 95.2 FL
MONOCYTES # BLD AUTO: 0.49 X10(3) UL (ref 0.1–1)
MONOCYTES NFR BLD AUTO: 8.6 %
NEUTROPHILS # BLD AUTO: 3.75 X10 (3) UL (ref 1.5–7.7)
NEUTROPHILS # BLD AUTO: 3.75 X10(3) UL (ref 1.5–7.7)
NEUTROPHILS NFR BLD AUTO: 65.5 %
OSMOLALITY SERPL CALC.SUM OF ELEC: 293 MOSM/KG (ref 275–295)
PLATELET # BLD AUTO: 205 10(3)UL (ref 150–450)
POTASSIUM SERPL-SCNC: 3.5 MMOL/L (ref 3.5–5.1)
PROT SERPL-MCNC: 6.7 G/DL (ref 6.4–8.2)
RBC # BLD AUTO: 3.51 X10(6)UL
SODIUM SERPL-SCNC: 139 MMOL/L (ref 136–145)
WBC # BLD AUTO: 5.7 X10(3) UL (ref 4–11)

## 2024-05-31 PROCEDURE — 99213 OFFICE O/P EST LOW 20 MIN: CPT | Performed by: INTERNAL MEDICINE

## 2024-05-31 NOTE — PROGRESS NOTES
Pt here for follow up and test results.    Outpatient Oncology Care Plan  Problem list:  knowledge deficit    Problems related to:    disease/disease progression    Interventions:  provided general teaching    Expected outcomes:  understands plan of care    Progress towards outcome:  making progress    Education Record    Learner:  Patient  Barriers / Limitations:  None  Method:  Brief focused  Outcome:  Shows understanding  Comments:

## 2024-05-31 NOTE — PROGRESS NOTES
Cancer Center Progress Note  Patient Name: Edy Deshpande III   YOB: 1950   Medical Record Number: [id number]   CSN: [id number]   Attending Physician: Kraig Gonzales M.D.       Date of Visit: 5/31/2024     Chief Complaint:  No chief complaint on file.       Oncologic History:  Edy Deshpande III is a 73 year old male referred for evaluation of anemia. The patient reports a long history of chrons disease. He was seen by pulmonology in January, 2024, who ordered a CBC, Iron studies, B12 and Folate. The CBC revealed a hgb of 11.3. In retrospect, his hgb has been decreased since Oct, 2022. Iron, B12 and Folate were normal at the time.   The patient reports that he feels tired, fighting the urge to take a nap in the afternoon.   No bleeding.  No F/C/NS. No palpable LAD.     He sees a chiropractic  and has started a group of supplements.     History of Present Illness:  Pt is here for follow up. No new complaints.     Performance Status:  ECOG 0    Past Medical History:  Past Medical History:    Abdominal hernia    not sure    Abdominal pain    had a recent occurrence    Anemia    not severe, but continues    Back pain    lower back- felt a pop in back    Belching    minor    Cancer (HCC)    skin face- MOHS    Constipation    Crohn disease (HCC)    Diarrhea, unspecified    Fatigue    occasional-late afternoon    Flatulence/gas pain/belching    Food intolerance    lactose intolerant    Frequent urination    Heartburn    Hemorrhoids    very mild    High cholesterol    History of COVID-19    2 day fever, fatigue, not hospitalized    Irregular bowel habits    Leaking of urine    minor amount    Mouth sores    rare    Nausea    had a recent occurrence    Night sweats    Osteoporosis    osteoperosis    Problems with swallowing    no longer a problem    Stool incontinence    minor and infrequent    Visual impairment    glasses    Vomiting    had a recent occurrence    Wears glasses       Past  Surgical History:  Past Surgical History:   Procedure Laterality Date    Bowel resection  1977    Colectomy  1977    Colonoscopy  2012    Colonoscopy N/A 4/15/2022    Procedure: COLONOSCOPY;  Surgeon: Juan Antonio Tapia MD;  Location:  ENDOSCOPY    Other      Abdominal surgery 1977    Sigmoidoscopy,diagnostic  20 years ago?    Tonsillectomy  1955       Family History:  Family History   Problem Relation Age of Onset    Colon Cancer Maternal Uncle         Cody    Crohn's Disease Sister         Bettina    Diabetes Brother         Charlee    Hypertension Brother         Sell    Heart Attack Mother         Sell    Stroke Father         Sell       Social History:  Social History     Socioeconomic History    Marital status:      Spouse name: Not on file    Number of children: Not on file    Years of education: Not on file    Highest education level: Not on file   Occupational History    Not on file   Tobacco Use    Smoking status: Never    Smokeless tobacco: Never   Vaping Use    Vaping status: Never Used   Substance and Sexual Activity    Alcohol use: Yes     Alcohol/week: 1.0 standard drink of alcohol     Types: 1 Glasses of wine per week     Comment: occasionally    Drug use: Never    Sexual activity: Not on file   Other Topics Concern    Not on file   Social History Narrative    Not on file     Social Determinants of Health     Financial Resource Strain: Not on file   Food Insecurity: Not on file   Transportation Needs: Not on file   Physical Activity: Not on file   Stress: Not on file   Social Connections: Not on file   Housing Stability: Not on file       Current Medications:    Current Outpatient Medications:     Turmeric 500 MG Oral Cap, Take by mouth daily., Disp: , Rfl:     B Complex Vitamins (VITAMIN B COMPLEX OR), Take by mouth As Directed., Disp: , Rfl:     Ammonium Lactate (LAC-HYDRIN) 12 % External Cream, Apply to feet twice daily, Disp: 280 g, Rfl: 2    Vitamin C 500 MG Oral Tab, Take 1 tablet (500  mg total) by mouth daily., Disp: , Rfl:     Omega-3 Fatty Acids (FISH OIL) 500 MG Oral Cap, Take by mouth., Disp: , Rfl:     multivitamin Oral Tab, Take 1 tablet by mouth daily., Disp: , Rfl:     Cholecalciferol (VITAMIN D-3) 5000 UNITS Oral Tab, Take 1 tablet (5,000 Units total) by mouth., Disp: , Rfl:     Allergies:  Allergies   Allergen Reactions    Latex ITCHING        Review of Systems:    Constitutional No fevers, chills, night sweats, excessive fatigue or weight loss.   Eyes No significant visual difficulties. No diplopia. No yellowing of the eyes.   Hematologic/Lymphatic No easy bruising or bleeding.  No any tender or palpable lymph nodes.   Respiratory No dyspnea, Pleuritic chest pain, cough or hemoptysis.   Cardiovascular No anginal chest pain, palpitations or orthopnea.   Gastrointestinal No nausea, vomiting, diarrhea, GI bleeding, or constipation.   Genitorurinary (M) No hematuria, dysuria, or incontinence. No abnormal bleeding.   Integumentary No rashes or yellowing of the skin   Neurologic No headache, blurred vision, and no areas of focal weakness. Normal gait.   Psychiatric No insomnia, depression, katie or mood swings.         Vital Signs:  /62 (BP Location: Left arm, Patient Position: Sitting, Cuff Size: adult)   Pulse 57   Temp 97.3 °F (36.3 °C) (Temporal)   Resp 14   Ht 1.715 m (5' 7.52\")   Wt 61 kg (134 lb 6.4 oz)   SpO2 99%   BMI 20.73 kg/m²     Physical Examination:      Laboratory:   Latest Reference Range & Units 05/17/24 13:44   Sodium 136 - 145 mmol/L 137   Potassium 3.5 - 5.1 mmol/L 4.2   Chloride 98 - 112 mmol/L 110   Carbon Dioxide, Total 21.0 - 32.0 mmol/L 22.0   BUN 9 - 23 mg/dL 31 (H)   CREATININE 0.70 - 1.30 mg/dL 1.46 (H)   CALCIUM 8.5 - 10.1 mg/dL 9.0   EGFR >=60 mL/min/1.73m2 50 (L)   ANION GAP 0 - 18 mmol/L 5   CALCULATED OSMOLALITY 275 - 295 mOsm/kg 290   ALKALINE PHOSPHATASE 45 - 117 U/L 67   AST (SGOT) 15 - 37 U/L 43 (H)   ALT (SGPT) 16 - 61 U/L 46   Total  Bilirubin 0.1 - 2.0 mg/dL 0.4   Globulin 2.8 - 4.4 g/dL 4.0   Iron, Serum 65 - 175 ug/dL 60 (L)   Transferrin 200 - 360 mg/dL 252   Iron Bind.Cap.(TIBC) 240 - 450 ug/dL 375   Iron Saturation 20 - 50 % 16 (L)   FERRITIN 30.0 - 530.0 ng/mL 83.5   Vitamin B12 193 - 986 pg/mL 1,728 (H)   A/G Ratio 1.0 - 2.0  0.9 (L)   PROTEIN, TOTAL 6.4 - 8.2 g/dL 7.6   Albumin 3.4 - 5.0 g/dL 3.6   ERYTHROPOIETIN (EPO) 2.6 - 18.5 mIU/mL 6.0   (H): Data is abnormally high  (L): Data is abnormally low   Latest Reference Range & Units 05/17/24 13:44   WBC 4.0 - 11.0 x10(3) uL 6.1   Hemoglobin 13.0 - 17.5 g/dL 12.2 (L)   Hematocrit 39.0 - 53.0 % 37.4 (L)   Platelet Count 150.0 - 450.0 10(3)uL 193.0   RBC 3.80 - 5.80 x10(6)uL 3.86   MCH 26.0 - 34.0 pg 31.6   MCHC 31.0 - 37.0 g/dL 32.6   MCV 80.0 - 100.0 fL 96.9   RDW % 14.6   Prelim Neutrophil Abs 1.50 - 7.70 x10 (3) uL 3.90   Neutrophils Absolute 1.50 - 7.70 x10(3) uL 3.90   Lymphocytes Absolute 1.00 - 4.00 x10(3) uL 1.45   Monocytes Absolute 0.10 - 1.00 x10(3) uL 0.53   Eosinophils Absolute 0.00 - 0.70 x10(3) uL 0.15   Basophils Absolute 0.00 - 0.20 x10(3) uL 0.04   Immature Granulocyte Absolute 0.00 - 1.00 x10(3) uL 0.01   Neutrophils % % 64.1   Lymphocytes % % 23.8   Monocytes % % 8.7   Eosinophils % % 2.5   Basophils % % 0.7   Immature Granulocyte % % 0.2   RETIC% 0.5 - 2.5 % 0.8   RETIC ABSOLUTE 22.5 - 147.5 x10(3) uL 31.7   Retic IRF 0.100 - 0.300 Ratio 0.086 (L)   Reticulocyte Hemoglobin Equivalent 28.2 - 36.6 pg 35.7   (L): Data is abnormally low   Latest Reference Range & Units 05/17/24 13:44   COPPER 69 - 132 ug/dL 84     Radiology:    Pathology:    Impression and Plan:  Anemia of renal insufficiency; His epo level was inappropriately normal for his anemia. Will screen for a monoclonal protein with an SPEP. We discussed a possible bone marrow biopsy, but we decided upon following with q3 month CBC. If his counts worsen, will plan a marrow at that time.     Planned Follow Up:  Q3  month labs.   1 year for MD follow up      Pre-visit charting and record review: 10 min  Visit time: 15 min  Post-visit charting: N/A  Total time on the day of service: 25 min      Electronically Signed by:    Kraig Gonzales M.D.  [facility]

## 2024-06-04 LAB
ALBUMIN SERPL ELPH-MCNC: 3.73 G/DL (ref 3.75–5.21)
ALBUMIN/GLOB SERPL: 1.45 {RATIO} (ref 1–2)
ALPHA1 GLOB SERPL ELPH-MCNC: 0.28 G/DL (ref 0.19–0.46)
ALPHA2 GLOB SERPL ELPH-MCNC: 0.57 G/DL (ref 0.48–1.05)
B-GLOBULIN SERPL ELPH-MCNC: 0.75 G/DL (ref 0.68–1.23)
GAMMA GLOB SERPL ELPH-MCNC: 0.96 G/DL (ref 0.62–1.7)
KAPPA LC FREE SER-MCNC: 7.06 MG/DL (ref 0.33–1.94)
KAPPA LC FREE/LAMBDA FREE SER NEPH: 2.04 {RATIO} (ref 0.26–1.65)
LAMBDA LC FREE SERPL-MCNC: 3.46 MG/DL (ref 0.57–2.63)
PROT SERPL-MCNC: 6.3 G/DL (ref 5.7–8.2)

## 2024-07-05 ENCOUNTER — LAB ENCOUNTER (OUTPATIENT)
Dept: LAB | Facility: HOSPITAL | Age: 74
End: 2024-07-05
Attending: NURSE PRACTITIONER
Payer: MEDICARE

## 2024-07-05 DIAGNOSIS — K50.10 CROHN'S DISEASE OF LARGE INTESTINE WITHOUT COMPLICATION (HCC): ICD-10-CM

## 2024-07-05 PROCEDURE — 83993 ASSAY FOR CALPROTECTIN FECAL: CPT

## 2024-07-08 PROBLEM — K50.10 CROHN'S DISEASE OF LARGE BOWEL (HCC): Status: ACTIVE | Noted: 2024-07-08

## 2024-07-08 PROBLEM — K51.50 LEFT SIDED ULCERATIVE (CHRONIC) COLITIS (HCC): Status: ACTIVE | Noted: 2024-07-08

## 2024-07-08 PROBLEM — R15.9 FECAL INCONTINENCE: Status: ACTIVE | Noted: 2024-07-08

## 2024-07-09 LAB — CALPROTECTIN STL-MCNT: 19.7 ΜG/G (ref ?–50)

## 2024-09-03 ENCOUNTER — TELEPHONE (OUTPATIENT)
Facility: LOCATION | Age: 74
End: 2024-09-03

## 2024-09-03 NOTE — TELEPHONE ENCOUNTER
[facility] and was referred to Dr. Wilder for Partial rectal prolapse.  Patient states while he was there the doctor pushed his rectum back in place.  Patient is scheduled for 10/24 but needs to get in sooner to avoid multiple trips the the emergency room.    Call back number is [phone]

## 2024-09-03 NOTE — TELEPHONE ENCOUNTER
Spoke with patient, scheduled 9/26/24.    Future Appointments   Date Time Provider Department Center   9/4/2024  1:30 PM [facility] MRI MOBILE (1.5T) [facility] MRI EM [place]   9/6/2024  1:20 PM [facility] DEXA RM1 [facility] DEXA [facility]   9/26/2024  1:30 PM Giancarlo Wilder MD EM[facility]ENSURNAP [id number]   10/8/2024  1:15 PM Arjun Stern MD [facility]

## 2024-09-04 ENCOUNTER — HOSPITAL ENCOUNTER (OUTPATIENT)
Dept: MRI IMAGING | Facility: HOSPITAL | Age: 74
Discharge: HOME | End: 2024-09-04
Attending: INTERNAL MEDICINE
Payer: MEDICARE

## 2024-09-04 DIAGNOSIS — R79.89 ABNORMAL LIVER FUNCTION TESTS: ICD-10-CM

## 2024-09-04 DIAGNOSIS — K50.80 CROHN'S DISEASE OF BOTH SMALL AND LARGE INTESTINE WITHOUT COMPLICATION (HCC): ICD-10-CM

## 2024-09-04 PROCEDURE — 76376 3D RENDER W/INTRP POSTPROCES: CPT | Performed by: INTERNAL MEDICINE

## 2024-09-04 PROCEDURE — 74183 MRI ABD W/O CNTR FLWD CNTR: CPT | Performed by: INTERNAL MEDICINE

## 2024-09-04 PROCEDURE — A9575 INJ GADOTERATE MEGLUMI 0.1ML: HCPCS | Performed by: INTERNAL MEDICINE

## 2024-09-04 RX ORDER — GADOTERATE MEGLUMINE 376.9 MG/ML
15 INJECTION INTRAVENOUS
Status: COMPLETED | OUTPATIENT
Start: 2024-09-04 | End: 2024-09-04

## 2024-09-04 RX ADMIN — GADOTERATE MEGLUMINE 12 ML: 376.9 INJECTION INTRAVENOUS at 15:01:00

## 2024-09-16 ENCOUNTER — LAB ENCOUNTER (OUTPATIENT)
Dept: LAB | Facility: HOSPITAL | Age: 74
End: 2024-09-16
Attending: INTERNAL MEDICINE
Payer: MEDICARE

## 2024-09-16 DIAGNOSIS — R63.4 WEIGHT LOSS: ICD-10-CM

## 2024-09-16 DIAGNOSIS — K50.80 CROHN'S DISEASE OF BOTH SMALL AND LARGE INTESTINE WITHOUT COMPLICATION (HCC): ICD-10-CM

## 2024-09-16 DIAGNOSIS — R79.89 ABNORMAL LIVER FUNCTION TESTS: ICD-10-CM

## 2024-09-16 DIAGNOSIS — R94.5 ABNORMAL RESULTS OF LIVER FUNCTION STUDIES: ICD-10-CM

## 2024-09-16 LAB
DEPRECATED HBV CORE AB SER IA-ACNC: 66.5 NG/ML
HAV IGM SER QL: NONREACTIVE
HBV CORE IGM SER QL: NONREACTIVE
HBV SURFACE AB SER QL: NONREACTIVE
HBV SURFACE AB SERPL IA-ACNC: <3.1 MIU/ML
HBV SURFACE AG SERPL QL IA: NONREACTIVE
HCV AB SERPL QL IA: NONREACTIVE
IRON SATN MFR SERPL: 18 %
IRON SERPL-MCNC: 63 UG/DL
TOTAL IRON BINDING CAPACITY: 348 UG/DL (ref 250–425)
TRANSFERRIN SERPL-MCNC: 257 MG/DL (ref 215–365)
TRIGL SERPL-MCNC: 88 MG/DL (ref 30–149)
VIT B12 SERPL-MCNC: 550 PG/ML (ref 211–911)
VIT D+METAB SERPL-MCNC: 27.2 NG/ML (ref 30–100)

## 2024-09-16 PROCEDURE — 84590 ASSAY OF VITAMIN A: CPT

## 2024-09-16 PROCEDURE — 83516 IMMUNOASSAY NONANTIBODY: CPT

## 2024-09-16 PROCEDURE — 86706 HEP B SURFACE ANTIBODY: CPT

## 2024-09-16 PROCEDURE — 84446 ASSAY OF VITAMIN E: CPT

## 2024-09-16 PROCEDURE — 83550 IRON BINDING TEST: CPT

## 2024-09-16 PROCEDURE — 82728 ASSAY OF FERRITIN: CPT

## 2024-09-16 PROCEDURE — 82103 ALPHA-1-ANTITRYPSIN TOTAL: CPT

## 2024-09-16 PROCEDURE — 82607 VITAMIN B-12: CPT

## 2024-09-16 PROCEDURE — 36415 COLL VENOUS BLD VENIPUNCTURE: CPT

## 2024-09-16 PROCEDURE — 82306 VITAMIN D 25 HYDROXY: CPT

## 2024-09-16 PROCEDURE — 84597 ASSAY OF VITAMIN K: CPT

## 2024-09-16 PROCEDURE — 83540 ASSAY OF IRON: CPT

## 2024-09-16 PROCEDURE — 80074 ACUTE HEPATITIS PANEL: CPT

## 2024-09-16 PROCEDURE — 84478 ASSAY OF TRIGLYCERIDES: CPT

## 2024-09-17 LAB
A-1-ANTITRYPSIN: 143 MG/DL
ACTIN SMOOTH MUSCLE AB: 28 UNITS
M2 MITOCHONDRIAL AB: 70.6 UNITS

## 2024-09-19 LAB — VITAMIN K1: 0.17 NG/ML

## 2024-09-23 VITALS — WEIGHT: 130 LBS | BODY MASS INDEX: 20.17 KG/M2 | HEIGHT: 67.5 IN

## 2024-09-25 LAB
VIT E ALPHA TOCO: 11.5 MG/L
VIT E GAMMA TOCO: 0.7 MG/L
VITAMIN A: 79.1 UG/DL

## 2024-09-26 ENCOUNTER — OFFICE VISIT (OUTPATIENT)
Facility: LOCATION | Age: 74
End: 2024-09-26
Payer: MEDICARE

## 2024-09-26 VITALS
TEMPERATURE: 97 F | OXYGEN SATURATION: 99 % | RESPIRATION RATE: 18 BRPM | DIASTOLIC BLOOD PRESSURE: 61 MMHG | HEART RATE: 53 BPM | SYSTOLIC BLOOD PRESSURE: 103 MMHG

## 2024-09-26 DIAGNOSIS — K62.3 RECTAL PROLAPSE: ICD-10-CM

## 2024-09-26 DIAGNOSIS — K64.2 GRADE III HEMORRHOIDS: Primary | ICD-10-CM

## 2024-09-26 PROCEDURE — 99204 OFFICE O/P NEW MOD 45 MIN: CPT | Performed by: STUDENT IN AN ORGANIZED HEALTH CARE EDUCATION/TRAINING PROGRAM

## 2024-09-26 PROCEDURE — 46600 DIAGNOSTIC ANOSCOPY SPX: CPT | Performed by: STUDENT IN AN ORGANIZED HEALTH CARE EDUCATION/TRAINING PROGRAM

## 2024-09-27 NOTE — PROCEDURES
Procedure: Anoscopy    Surgeon: Giancarlo Wilder    Anesthesia: None    Findings: See the progress note attached for all findings    Operative Summary: The patient was placed in a prone position on the proctoscopy table, the hips were flexed in the jackknife position.    Using a well-lubricated finger, digital rectal exam was performed in anticipation of the anoscope.    The anoscope was then inserted under direct visualization.    Excellent visualization was performed in a 360° fashion.    The scope was removed. The patient was taken out of the prone, jackknife, position.     The patient tolerated the procedure well.

## 2024-09-27 NOTE — H&P
New Patient Visit Note       Active Problems      1. Grade III hemorrhoids    2. Rectal prolapse        Chief Complaint   Chief Complaint   Patient presents with    New Patient     NP- Rectal Prolapse- reports hemorrhoids, occasional bleeding       History of Present Illness   This is a very nice 74-year-old gentleman referred to me from Dr. Tapia of [facility] with concern for rectal prolapse.  Patient does have a history of ileocolonic Crohn's disease and is currently not on any active treatment for this. He has been in endoscopic and histologic remission and underwent a flexible sigmoidoscopy with Dr. Tapia in July. He refused to have a full colonoscopy.  He underwent an ileocecectomy in 1977 at [facility].    Patient has noted intermittent anorectal symptoms which she is attributed to hemorrhoids over the last 20+ years.  The symptoms have been more frequent over the last 6-8 months.  He is currently experiencing prolapse of tissue with almost every bowel movement.  After having a bowel movement, patient states he has to sit on a hard surface and the tissue will go back into his rectum.  He denies any rectal pain and has very rare rectal bleeding.  He is currently taking a twice daily fiber supplement.  He denies any straining lately.  He does feel the sensation of incomplete emptying around 50% of the time.  He denies any itching, drainage or incontinence.  No prior anorectal surgery.    Patient last saw Dr. Tapia in the office on 9/3/2024.  Dr. Tapia's exam describes an everted rectum which was erythematous, soft and mildly tender.  Dr. Tapia was able to reduce the prolapsed rectum and notes no external hemorrhoids.  Patient himself is unsure if he feels like his whole rectum is prolapsing out or if it is hemorrhoid tissue.      Allergies  Edy is allergic to latex.    Past Medical / Surgical / Social / Family History    The past medical and past surgical history have been  reviewed by me today.    Past Medical History:    Abdominal hernia    not sure    Abdominal pain    had a recent occurrence    Anemia    not severe, but continues    Back pain    lower back- felt a pop in back    Back problem    LOWER BACK PAIN    Belching    minor    Cancer (HCC)    skin face- MOHS    Constipation    Crohn disease (HCC)    Diarrhea, unspecified    Esophageal reflux    Fatigue    occasional-late afternoon    Flatulence/gas pain/belching    Food intolerance    lactose intolerant    Frequent urination    Heartburn    Hemorrhoids    very mild    High cholesterol    History of COVID-19    2 day fever, fatigue, not hospitalized    Irregular bowel habits    Leaking of urine    minor amount    Mouth sores    rare    Nausea    had a recent occurrence    Night sweats    Osteoporosis    osteoperosis    Problems with swallowing    no longer a problem    Stool incontinence    minor and infrequent    Visual impairment    glasses    Vomiting    had a recent occurrence    Wears glasses     Past Surgical History:   Procedure Laterality Date    Bowel resection  1977    Colectomy  1977    Colonoscopy  2012    Colonoscopy N/A 4/15/2022    Procedure: COLONOSCOPY;  Surgeon: Juan Antonio Tapia MD;  Location:  ENDOSCOPY    Other      Abdominal surgery 1977    Sigmoidoscopy,diagnostic  20 years ago?    Tonsillectomy  1955       The family history and social history have been reviewed by me today.    Family History   Problem Relation Age of Onset    Colon Cancer Maternal Uncle         O'Arbuckle    Crohn's Disease Sister         Bettina    Diabetes Brother         Sell    Hypertension Brother         Sell    Heart Attack Mother         Sell    Stroke Father         Sell     Social History     Socioeconomic History    Marital status:    Tobacco Use    Smoking status: Never    Smokeless tobacco: Never   Vaping Use    Vaping status: Never Used   Substance and Sexual Activity    Alcohol use: Yes     Alcohol/week: 1.0  standard drink of alcohol     Types: 1 Glasses of wine per week     Comment: occasionally    Drug use: Never        Current Outpatient Medications:     ergocalciferol 1.25 MG (22910 UT) Oral Cap, Take 1 capsule (50,000 Units total) by mouth once a week., Disp: 12 capsule, Rfl: 1    Turmeric 500 MG Oral Cap, Take by mouth daily., Disp: , Rfl:     B Complex Vitamins (VITAMIN B COMPLEX OR), Take by mouth As Directed., Disp: , Rfl:     Vitamin C 500 MG Oral Tab, Take 1 tablet (500 mg total) by mouth daily., Disp: , Rfl:     Omega-3 Fatty Acids (FISH OIL) 500 MG Oral Cap, Take by mouth., Disp: , Rfl:     multivitamin Oral Tab, Take 1 tablet by mouth daily., Disp: , Rfl:       Review of Systems  A 10 point review of systems was performed and negative unless otherwise documented per HPI.    Physical Findings   /61   Pulse 53   Temp 96.8 °F (36 °C) (Temporal)   Resp 18   SpO2 99%   Physical Exam  Vitals and nursing note reviewed. Exam conducted with a chaperone present.   Constitutional:       General: He is not in acute distress.  HENT:      Head: Normocephalic and atraumatic.      Mouth/Throat:      Mouth: Mucous membranes are moist.   Cardiovascular:      Rate and Rhythm: Normal rate and regular rhythm.   Pulmonary:      Effort: Pulmonary effort is normal.   Abdominal:      General: There is no distension.      Palpations: Abdomen is soft.      Tenderness: There is no abdominal tenderness.   Genitourinary:     Comments: Patient examined in the prone jackknife position with medical assistant chaperone present.  External exam of the anus is normal.  No active prolapsed tissue.  Digital rectal exam shows fair tone and squeeze without any masses, bleeding, drainage or tenderness.  Anoscopy reveals prolapsing distal rectal and internal hemorrhoid tissue especially in the right anterior and right posterior positions.      I did also examine the patient on the toilet after straining.  Patient was unable to reproduce  any prolapsed tissue.  Musculoskeletal:         General: No deformity.   Skin:     General: Skin is warm and dry.   Neurological:      General: No focal deficit present.      Mental Status: He is alert.   Psychiatric:         Mood and Affect: Mood normal.             Assessment   1. Grade III hemorrhoids    2. Rectal prolapse        This is a very nice 74-year-old gentleman referred to me from Dr. Tapia of [facility] with concern for rectal prolapse.  Patient does have a history of ileocolonic Crohn's disease and is currently not on any active treatment for this. He has been in endoscopic and histologic remission and underwent a flexible sigmoidoscopy with Dr. Tapia in July. He refused to have a full colonoscopy.  He underwent an ileocecectomy in 1977 at [facility].    Patient has noted intermittent anorectal symptoms which she is attributed to hemorrhoids over the last 20+ years.  The symptoms have been more frequent over the last 6-8 months.  He is currently experiencing prolapse of tissue with almost every bowel movement.  After having a bowel movement, patient states he has to sit on a hard surface and the tissue will go back into his rectum.  He denies any rectal pain and has very rare rectal bleeding.  He is currently taking a twice daily fiber supplement.  He denies any straining lately.  He does feel the sensation of incomplete emptying around 50% of the time.  He denies any itching, drainage or incontinence.  No prior anorectal surgery.    Patient last saw Dr. Tapia in the office on 9/3/2024.  Dr. Tapia's exam describes an everted rectum which was erythematous, soft and mildly tender.  Dr. Tapia was able to reduce the prolapsed rectum and notes no external hemorrhoids.  Patient himself is unsure if he feels like his whole rectum is prolapsing out or if it is hemorrhoid tissue.    External exam of the anus is normal.  No active prolapsed tissue.  Digital rectal exam shows fair  tone and squeeze without any masses, bleeding, drainage or tenderness.  Anoscopy reveals prolapsing distal rectal and internal hemorrhoid tissue especially in the right anterior and right posterior positions.      I did also examine the patient on the toilet after straining.  Patient was unable to reproduce any prolapsed tissue.    Plan  I counseled patient that his bedside exam with anoscopy did show large, prolapsing internal hemorrhoids and some prolapsing distal rectal mucosa.  I was unable to appreciate any obvious evidence of full-thickness rectal prolapse but this is still possible given Dr. Tapia's exam findings.      I would recommend starting with rubber band ligation and sclerotherapy for the large, prolapsing internal hemorrhoids to see if this will reduce his prolapse symptoms.  I counseled patient that this procedure can generally be performed safely at the bedside but may require 3-4 sessions to fully treat his disease.  The procedure was discussed in detail including the risks, benefits and alternatives.  Patient expressed understanding and was agreeable to begin a series of rubber band ligation/sclerotherapy treatments for his internal hemorrhoids.    Patient may also benefit from pelvic floor physical therapy especially if he continues to struggle with straining and incomplete bowel movements.  Patient was agreeable to consider this and was given the pelvic floor physical therapy office numbers with a referral order.  Lastly patient fails to improve after rubber band ligation/sclerotherapy treatment and/or there is definitive evidence of full-thickness rectal prolapse, we may need to consider more aggressive surgical intervention such as robotic suture rectopexy.  Patient expressed understanding and was agreeable to plan.     No orders of the defined types were placed in this encounter.      Imaging & Referrals   PELVIC FLOOR THERAPY - INTERNAL    Follow Up  No follow-ups on file.    Giancarlo  MD Tina

## 2024-10-02 ENCOUNTER — APPOINTMENT (OUTPATIENT)
Dept: LAB | Facility: HOSPITAL | Age: 74
End: 2024-10-02
Attending: INTERNAL MEDICINE
Payer: MEDICARE

## 2024-10-02 ENCOUNTER — HOSPITAL ENCOUNTER (OUTPATIENT)
Dept: ULTRASOUND IMAGING | Facility: HOSPITAL | Age: 74
Discharge: HOME | End: 2024-10-02
Attending: INTERNAL MEDICINE
Payer: MEDICARE

## 2024-10-08 NOTE — H&P (VIEW-ONLY)
Arjun Stern M.D. Dermatology  Dermatology Visit  Edy Deshpande III  10/08/24  SKIN CANCER and/or PRECANCER HISTORY: AK and SCC  Allergies:  Allergies   Allergen Reactions    Latex ITCHING      CHIEF COMPLAINT:  Chief Complaint   Patient presents with    Full Skin Exam     Patient notes no new or changing lesions        ROS:  New or changing skin lesions?  Right posterior back    Skin Problems Discussed or Evaluated at Today's Encounter:    Patient has a skin lesion on the back he wants checked  Objective: Right upper back with an approximately 10 x 7 mm lightly hyperpigmented hyperkeratotic plaque  Assessment: Seborrheic keratosis right upper back.  There is also a seborrheic keratosis on the mid back measuring approximately 6 x 8 mm.  Plan: Patient reassured regarding the seborrheic keratoses and no therapy is required.    On skin examination noted below, there were no skin lesions consistent with skin cancer.  There was a well-healed scar on the temple at the site of his SCC excision        All of the following cutaneous and mucosal sites were examined today as part of a full skin examination: Head, including face, neck, chest, axillae, breasts, abdomen, back, right upper extremity, left upper extremity, right lower extremity, left lower extremity, lips, teeth, gums, oropharynx, genitalia, groin, and buttocks, excluding the following sites which were not examined at the patient's request:  None.  The sites listed below were examined and unless stated otherwise the findings were unremarkable:  Lymphatics: no  Liver/Spleen: no   Unless stated otherwise, all procedures noted in today's encounter were performed today  Return to clinic: 6 months    Arjun Stern MD

## 2024-10-15 ENCOUNTER — HOSPITAL ENCOUNTER (OUTPATIENT)
Dept: ULTRASOUND IMAGING | Facility: HOSPITAL | Age: 74
Discharge: HOME | End: 2024-10-15
Attending: INTERNAL MEDICINE
Payer: MEDICARE

## 2024-10-15 ENCOUNTER — NURSE ONLY (OUTPATIENT)
Dept: LAB | Facility: HOSPITAL | Age: 74
End: 2024-10-15
Attending: INTERNAL MEDICINE
Payer: MEDICARE

## 2024-10-15 VITALS
TEMPERATURE: 98 F | DIASTOLIC BLOOD PRESSURE: 71 MMHG | HEART RATE: 50 BPM | OXYGEN SATURATION: 100 % | RESPIRATION RATE: 16 BRPM | HEIGHT: 67.5 IN | SYSTOLIC BLOOD PRESSURE: 118 MMHG | WEIGHT: 130 LBS | BODY MASS INDEX: 20.17 KG/M2

## 2024-10-15 DIAGNOSIS — R79.89 ABNORMAL LIVER FUNCTION TESTS: ICD-10-CM

## 2024-10-15 DIAGNOSIS — R79.89 ABNORMAL LFTS (LIVER FUNCTION TESTS): Primary | ICD-10-CM

## 2024-10-15 DIAGNOSIS — R79.89 ABNORMAL LFTS (LIVER FUNCTION TESTS): ICD-10-CM

## 2024-10-15 LAB
INR BLD: 1.07 (ref 0.8–1.2)
PLATELET # BLD AUTO: 215 10(3)UL (ref 150–450)
PROTHROMBIN TIME: 14.1 SECONDS (ref 11.6–14.8)

## 2024-10-15 PROCEDURE — 47000 NEEDLE BIOPSY OF LIVER PERQ: CPT | Performed by: INTERNAL MEDICINE

## 2024-10-15 PROCEDURE — 85610 PROTHROMBIN TIME: CPT

## 2024-10-15 PROCEDURE — 76942 ECHO GUIDE FOR BIOPSY: CPT | Performed by: INTERNAL MEDICINE

## 2024-10-15 PROCEDURE — 88313 SPECIAL STAINS GROUP 2: CPT | Performed by: INTERNAL MEDICINE

## 2024-10-15 PROCEDURE — 85049 AUTOMATED PLATELET COUNT: CPT | Performed by: STUDENT IN AN ORGANIZED HEALTH CARE EDUCATION/TRAINING PROGRAM

## 2024-10-15 PROCEDURE — 88307 TISSUE EXAM BY PATHOLOGIST: CPT | Performed by: INTERNAL MEDICINE

## 2024-10-15 PROCEDURE — 36415 COLL VENOUS BLD VENIPUNCTURE: CPT

## 2024-10-15 PROCEDURE — 99152 MOD SED SAME PHYS/QHP 5/>YRS: CPT | Performed by: INTERNAL MEDICINE

## 2024-10-15 RX ORDER — MIDAZOLAM HYDROCHLORIDE 1 MG/ML
1 INJECTION INTRAMUSCULAR; INTRAVENOUS EVERY 5 MIN PRN
Status: DISCONTINUED | OUTPATIENT
Start: 2024-10-15 | End: 2024-10-17

## 2024-10-15 RX ORDER — NALOXONE HYDROCHLORIDE 0.4 MG/ML
INJECTION, SOLUTION INTRAMUSCULAR; INTRAVENOUS; SUBCUTANEOUS
Status: DISCONTINUED
Start: 2024-10-15 | End: 2024-10-15 | Stop reason: WASHOUT

## 2024-10-15 RX ORDER — NALOXONE HYDROCHLORIDE 0.4 MG/ML
0.08 INJECTION, SOLUTION INTRAMUSCULAR; INTRAVENOUS; SUBCUTANEOUS AS NEEDED
Status: DISCONTINUED | OUTPATIENT
Start: 2024-10-15 | End: 2024-10-17

## 2024-10-15 RX ORDER — MIDAZOLAM HYDROCHLORIDE 1 MG/ML
INJECTION INTRAMUSCULAR; INTRAVENOUS
Status: COMPLETED
Start: 2024-10-15 | End: 2024-10-15

## 2024-10-15 RX ORDER — FLUMAZENIL 0.1 MG/ML
0.2 INJECTION INTRAVENOUS AS NEEDED
Status: DISCONTINUED | OUTPATIENT
Start: 2024-10-15 | End: 2024-10-17

## 2024-10-15 RX ORDER — FLUMAZENIL 0.1 MG/ML
INJECTION INTRAVENOUS
Status: DISCONTINUED
Start: 2024-10-15 | End: 2024-10-15 | Stop reason: WASHOUT

## 2024-10-15 RX ORDER — SODIUM CHLORIDE 9 MG/ML
INJECTION, SOLUTION INTRAVENOUS CONTINUOUS
Status: DISCONTINUED | OUTPATIENT
Start: 2024-10-15 | End: 2024-10-17

## 2024-10-15 RX ADMIN — SODIUM CHLORIDE: 9 INJECTION, SOLUTION INTRAVENOUS at 09:40:00

## 2024-10-15 RX ADMIN — MIDAZOLAM HYDROCHLORIDE 1 MG: 1 INJECTION INTRAMUSCULAR; INTRAVENOUS at 09:47:00

## 2024-10-15 NOTE — IMAGING NOTE
Pt post liver biopsy. Tolerated procedure and sedation well. Denies any pain. Biospy site right side CDI with tegaderm. Pt denies any pain.  Alert and oriented. Report called to Elvis in [facility] and bed assigned. SBAR at bedside to Elvis.

## 2024-10-15 NOTE — DISCHARGE INSTRUCTIONS
Discharge/After Visit [facility] - Department of Radiology  Biopsy - Renal (Kidney), Liver, Lung, Bone, Retroperitoneal Location    Post Sedation  Follow these guidelines:  You should be watched overnight by a responsible adult. This person should make sure your condition remains stable.   Do not drink any alcohol for 24 hours after the procedure.  Don’t drive, operate dangerous machinery, make important business or personal decisions, or sign legal documents for 24 hours after the procedure.  Note: Your healthcare provider may tell you not to take any medicine by mouth for pain or sleep for a period of time. These medicines may react with the medicine you were given during your procedure. This could cause a much stronger response than usual.     Activity:  Take it easy for the rest of the day after your biopsy.   You may be sore at the site of the biopsy for the next 5 days.   Do not do any strenuous exercises or lift over five pounds for the next 24 hours.     Biopsy Site:  Keep a bandage on the biopsy site for 24 hours after the procedure.   You may shower after 24 hours, but no soaking in a bathtub for 48 hours.     Liver Biopsy: If there is any increase in right-sided back, shoulder, or abdominal pain, go to the nearest Emergency Room. Call your doctor for unusual dizziness or weakness.     Diet: Drink plenty of fluids and resume your usual home diet. A slow return to normal foods minimizes nausea.    Pain Management:   You may use over-the-counter or prescribed pain relief medication if it is not contraindicated by another condition.     Medications:  You may resume your usual home medications. You may resume blood thinners 24 hours after the procedure if there is no bleeding from/hematoma at the puncture site or bloody urine (Renal Biopsy only)     When to seek medical advice:  Call the provider that ordered the biopsy with any questions and to discuss test results. These may also be  available in your [facility] My Chart. You may also contact the Radiology Nurse at [phone], M-F, 8-5 with any additional questions or concerns.  Dial [phone] and ask the  to page the on-call IR Radiologist if any of these occur:    A change in color or temperature of the area where the biopsy was performed.  You develop increasing pain or shortness of breath.  Unusual drowsiness, weakness, or dizziness.  Unusual vomiting.     IF YOU FEEL YOU ARE EXPERIENCING AN EMERGENCY,   CALL 911 OR GO TO THE NEAREST EMERGENCY ROOM      4.2.24 MO/  Radiology

## 2024-10-15 NOTE — INTERVAL H&P NOTE
The above referenced H&P was reviewed by Anabel Julio MD on 10/15/2024, the patient was examined and no significant changes have occurred in the patient's condition since the H&P was performed.  Risks, benefits, alternative treatments and consequences of no treatment were discussed.  We will proceed with procedure as planned.      Anabel Julio MD  10/15/2024  9:55 AM

## 2024-10-15 NOTE — IMAGING NOTE
Carmen Snow in [facility] for US liver biopsy. Procedure discussed, pre-procedure check list completed, and IV placed. Please see flow-sheets for vital signs and/ or additional information. SBAR given to Carolyn Resendez RN    Patient requested to have labs draw from IV once placed. Labs drawn, sent w/ STAT label and lab notified by phlebotomy they were sent via tube system

## 2024-10-15 NOTE — PROCEDURES
[facility]   part of [facility]  Procedure Note    Edy Deshpande III Patient Status:  Outpatient    1950 MRN [id number]   Location [facility] ULTRASOUND Attending Kinjal Higgins   Hosp Day # 0 PCP KINJAL HIGGINS     Procedure: Liver biopsy    Pre-Procedure Diagnosis:  Elevated liver enzymes    Post-Procedure Diagnosis: Same    Anesthesia:  Local and Sedation    Findings:  Successful US guided liver biopsy    Specimens: Cores    Blood Loss:  <5ml    Tourniquet Time: 0  Complications:  None  Drains:  None    Secondary Diagnosis:  None    Anabel Julio MD  10/15/2024

## 2024-10-18 ENCOUNTER — HOSPITAL ENCOUNTER (OUTPATIENT)
Dept: BONE DENSITY | Age: 74
Discharge: HOME | End: 2024-10-18
Attending: INTERNAL MEDICINE
Payer: MEDICARE

## 2024-10-18 DIAGNOSIS — K50.80 CROHN'S DISEASE OF BOTH SMALL AND LARGE INTESTINE WITHOUT COMPLICATION (HCC): ICD-10-CM

## 2024-10-18 DIAGNOSIS — M81.0 OSTEOPOROSIS, UNSPECIFIED OSTEOPOROSIS TYPE, UNSPECIFIED PATHOLOGICAL FRACTURE PRESENCE: ICD-10-CM

## 2024-10-18 PROCEDURE — 77080 DXA BONE DENSITY AXIAL: CPT | Performed by: INTERNAL MEDICINE

## 2024-10-21 ENCOUNTER — OFFICE VISIT (OUTPATIENT)
Facility: LOCATION | Age: 74
End: 2024-10-21
Payer: MEDICARE

## 2024-10-21 VITALS
RESPIRATION RATE: 16 BRPM | HEART RATE: 51 BPM | TEMPERATURE: 97 F | DIASTOLIC BLOOD PRESSURE: 56 MMHG | OXYGEN SATURATION: 99 % | SYSTOLIC BLOOD PRESSURE: 100 MMHG

## 2024-10-21 DIAGNOSIS — K64.2 GRADE III HEMORRHOIDS: Primary | ICD-10-CM

## 2024-10-21 PROCEDURE — 46221 LIGATION OF HEMORRHOID(S): CPT | Performed by: STUDENT IN AN ORGANIZED HEALTH CARE EDUCATION/TRAINING PROGRAM

## 2024-11-01 NOTE — PROCEDURES
At today's office visit, we treated the right anterior internal hemorrhoid.     Patient was positioned in prone jackknife with nursing chaperone present.  External exam of the anus was normal.  Digital rectal exam was performed. A well-lubricated anoscope was inserted into the anal canal.  There was clear visualization of a large right anterior internal hemorrhoid.  The hemorrhoid column was grasped above the dentate line and and a rubber band was applied at its base.  1 cc of 5% phenol solution was injected at the base of the hemorrhoid.  The anoscope was removed and digital rectal exam confirmed satisfactory positioning of the rubber band.     The patient tolerated the procedure well.  The patient remained stable throughout the entire procedure within my office.     The patient was asked to recover in my office for a few minutes prior to leaving for home.     The patient should refrain from extreme sports or athletic activity, including heavy lifting.  Patient can take Tylenol and/or ibuprofen as needed for pain.  I advised patient to call the office with excessive bleeding with clots, fevers, urinary retention or any other concerning symptoms.     We will see this patient again in 2-3 weeks for further care and treatment.

## 2024-11-07 ENCOUNTER — OFFICE VISIT (OUTPATIENT)
Facility: LOCATION | Age: 74
End: 2024-11-07
Payer: MEDICARE

## 2024-11-07 VITALS
TEMPERATURE: 98 F | DIASTOLIC BLOOD PRESSURE: 52 MMHG | RESPIRATION RATE: 20 BRPM | SYSTOLIC BLOOD PRESSURE: 90 MMHG | HEART RATE: 54 BPM | OXYGEN SATURATION: 98 %

## 2024-11-07 DIAGNOSIS — K64.2 GRADE III HEMORRHOIDS: Primary | ICD-10-CM

## 2024-11-07 PROCEDURE — 46221 LIGATION OF HEMORRHOID(S): CPT | Performed by: STUDENT IN AN ORGANIZED HEALTH CARE EDUCATION/TRAINING PROGRAM

## 2024-11-07 NOTE — PROCEDURES
At today's office visit, we treated the left lateral internal hemorrhoid.  Patient is still experiencing prolapse with every bowel movement that he has to reduce by sitting on a hard surface.     Patient was positioned in prone jackknife with nursing chaperone present.  External exam of the anus was normal.  Digital rectal exam was performed. A well-lubricated anoscope was inserted into the anal canal.  There was clear visualization of a very large left lateral internal hemorrhoid.  The hemorrhoid column was grasped above the dentate line and and a rubber band was applied at its base.  1 cc of 5% phenol solution was injected at the base of the hemorrhoid.  The anoscope was removed and digital rectal exam confirmed satisfactory positioning of the rubber band.     The patient tolerated the procedure well.  The patient remained stable throughout the entire procedure within my office.     The patient was asked to recover in my office for a few minutes prior to leaving for home.     The patient should refrain from extreme sports or athletic activity, including heavy lifting.  Patient can take Tylenol and/or ibuprofen as needed for pain.  I advised patient to call the office with excessive bleeding with clots, fevers, urinary retention or any other concerning symptoms.     We will see this patient again in 3-4 weeks for further care and treatment.

## 2024-11-21 ENCOUNTER — HOSPITAL ENCOUNTER (OUTPATIENT)
Dept: GENERAL RADIOLOGY | Facility: HOSPITAL | Age: 74
Discharge: HOME | End: 2024-11-21
Attending: INTERNAL MEDICINE
Payer: MEDICARE

## 2024-11-21 DIAGNOSIS — K50.80 CROHN'S DISEASE OF BOTH SMALL AND LARGE INTESTINE WITHOUT COMPLICATION (HCC): ICD-10-CM

## 2024-11-21 DIAGNOSIS — R15.9 INCONTINENCE OF FECES, UNSPECIFIED FECAL INCONTINENCE TYPE: ICD-10-CM

## 2024-11-21 DIAGNOSIS — K59.09 CONSTIPATION, CHRONIC: ICD-10-CM

## 2024-11-21 PROCEDURE — 74018 RADEX ABDOMEN 1 VIEW: CPT | Performed by: INTERNAL MEDICINE

## 2024-12-05 ENCOUNTER — OFFICE VISIT (OUTPATIENT)
Facility: LOCATION | Age: 74
End: 2024-12-05
Payer: MEDICARE

## 2024-12-05 VITALS
OXYGEN SATURATION: 100 % | DIASTOLIC BLOOD PRESSURE: 49 MMHG | HEART RATE: 67 BPM | SYSTOLIC BLOOD PRESSURE: 90 MMHG | TEMPERATURE: 97 F | RESPIRATION RATE: 20 BRPM

## 2024-12-05 DIAGNOSIS — K62.3 RECTAL PROLAPSE: Primary | ICD-10-CM

## 2024-12-05 PROCEDURE — 99213 OFFICE O/P EST LOW 20 MIN: CPT | Performed by: STUDENT IN AN ORGANIZED HEALTH CARE EDUCATION/TRAINING PROGRAM

## 2024-12-23 ENCOUNTER — LAB ENCOUNTER (OUTPATIENT)
Dept: LAB | Facility: HOSPITAL | Age: 74
End: 2024-12-23
Attending: INTERNAL MEDICINE
Payer: MEDICARE

## 2024-12-23 DIAGNOSIS — E55.9 VITAMIN D DEFICIENCY: ICD-10-CM

## 2024-12-23 LAB — VIT D+METAB SERPL-MCNC: 19.4 NG/ML (ref 30–100)

## 2024-12-23 PROCEDURE — 82306 VITAMIN D 25 HYDROXY: CPT

## 2024-12-23 PROCEDURE — 36415 COLL VENOUS BLD VENIPUNCTURE: CPT

## 2025-01-08 ENCOUNTER — OFFICE VISIT (OUTPATIENT)
Dept: PHYSICAL THERAPY | Age: 75
End: 2025-01-08
Attending: STUDENT IN AN ORGANIZED HEALTH CARE EDUCATION/TRAINING PROGRAM
Payer: MEDICARE

## 2025-01-08 DIAGNOSIS — K62.3 RECTAL PROLAPSE: Primary | ICD-10-CM

## 2025-01-08 PROCEDURE — 97163 PT EVAL HIGH COMPLEX 45 MIN: CPT | Performed by: PHYSICAL THERAPIST

## 2025-01-08 NOTE — PROGRESS NOTES
MUSCULOSKELETAL AND PELVIC FLOOR EVALUATION:     Diagnosis:   Rectal prolapse (K62.3) Patient:  Edy Deshpande III (74 year old, male)        Referring Provider: Giancarlo Wilder  Today's Date   1/8/2025    Precautions:  None   Date of Evaluation: 01/08/25  Next MD visit: 2/4/2024  Date of Surgery: n/a     PATIENT SUMMARY   Summary of chief complaints: rectal prolapse. incomplete bowel emptying  History of current condition: When he uses to bathroom for bowel movement, the rectum will come out.To put it back inside,  he sits on hard surface, it makes it difficult because he does not have any other opportunity to do it except at home, and it is hard for him to be outside of the home for longer time. Has been avoiding straining, and he feels that it is not emptying completely. Had colon surgery in 1977 for Chron's and got his life back, not having pain and got back to normal. The rectal prolapse started within the past year. Stool consistency varies. In the last 2 months, it is on the soft side, it is more difficut because there is more residue, harder to clean. There was a time, he had diarrhea for 9 days, a few weeks ago. Once every 6 years, he will have blockage and end up in the ER, affected by what he eats. Now seeing someone for nutrition. Denies pelvic and abdominal area. Pain on the LB from incidents from 4 years and a year ago, where he flet a pop on his LB and had pain. Sees DC regularly, and pain is managed. Urine dribbles for many years. After he has prolapse and goes back in, he  feels that he could urinate more but cannot, depends how rough the episode was with the prolapse.   Pain level: current 0 /10, at best 0 /10, at worst 2 /10  Description of symptoms: ache   Occupation: Retired, side business   Leisure activities/Hobbies: walks 3 miles a day, TM or outside   Prior level of function: independent with ADLs  Current limitations: not physically, affects socialization, leaving the house  for long  periods  Pt goals: Ideally to control the retacl prolapse, both surgeon and MD thinks its a good idea  Pregnant Now: No   OB History   No obstetric history on file.     Urodynamic Test:     Manometry: scheduled   PFDI 20    Scores   POPDI 6:       CRAD 8:    43.75   CHARITO 6:    41.67   Summary:          Outpatient Therapy Pelvic Health Intake       Question 1/8/2025 10:05 AM CST - Filed by Patient    Please provide details on the following urinary history / complaints     Frequency of urination: # of times/day 4    Frequency of urination: # of times/night 3    When you have the urge to urinate, how long can you delay before you have to go to the toilet? (# of minutes or hours) no delay    Do you have a history of urinary tract infections: No    Do you have a history of urine loss (select all that apply)       How many times a day does leakage occur?       If you have leakage, what type(s) of protective device(s) do you use? (Select all that apply) None    On average, how many pads do you use each day?       Do you soak the pad fully?       Do you change the pad each time it is wet?       Do you experience any of these symptoms? (Select all that apply) Dribble after you empty your bladder    Do you usually experience frequent urination? Quite a bit    Do you usually experience urine leakage associated with a feeling of urgency, that is, a strong sensation of needing to go to the bathroom? Quite a bit    Do you usually experience urine leakage related to coughing, sneezing, or laughing? Not present    Do you usually experience small amounts of urine leakage (that is, drops)? Somewhat    Do you usually experience difficulty empyting your bladder? Not present    Do you usually experience pain or discomfort in the lower abdomen or genital region? Not present    Have you ever had any of the following treatment:     Have you ever done exercises to control urine loss? If so, for how long? no    Has your doctor ever prescribed  any medication for urine loss? No    Have you had any surgical procedures to treat urine loss? No    Please provide details on the following bowel history / complaints.     How many bowel movements do you have per day? 1    How many bowel movements do you have per night? 1    Do you experience diarrhea? If yes, how often? occasionally    Do you feel you need to strain too hard to have a bowel movement? Not present    Do you feel you have not completely emptied your bowels at the end of a bowel movement? Not at all    Do you usually lose stool beyond your control if your stool is well formed? Not at all    Do you usually lose stool beyond your control if your stool is loose? Somewhat    Do you usually lose gas from the rectum beyond your control? Quite a bit    Do you usually have pain when you pass your stool? Not present    Do you experience strong sense of urgency and have to rush to bathroom for bowel movement? Somewhat    Does part of bowel ever pass through rectum and bulge outside during/after bowel movement? Quite a bit    Please provide details on your daily fluid/food intake.     On average, what is your daily fluid intake (1 glass=8ounces)? (# of glasses per day) 5    How many are caffeinated? (# of glasses per day) 0    Do you restrict fluids because of incontenence (leakage)? No    Do you include fiber in your diet (fruits, vegetables, bran, etc.)? Yes    Psychosocial information     Do you live alone? Yes    Which recreational activities do you participate in if any? walking    Have you had to restrict your activities due to your pelvic health concerns? Yes    On a scale of 0 (no impairments) to 10 (severe impairments), what are your feelings about your urinary or bowel incontinence or pelvic pain? 6    Have you had any changes in intimate relationships/sexual function due to your symptoms?  No    Your personal safety is of utmost importance to us at [facility], please answer the  following questions:     Are you being hurt, frightened, demeaned, or taken advantage of by anyone at your home or in your life?  No    Have you recently had thoughts of hurting yourself? No    Have you tried to hurt yourself in the past?  No    Pelvic Organ Prolapse Distress Inventory 6 Score (range: 0 - 100) Incomplete    Colorectal-Anal Distress Inventory 8 Score (range: 0 - 100) 43.75    Urinary Distress Inventory 6 Score (range: 0 - 100) 41.67    PFDI Summary Score (range: 0 - 300) Incomplete            Past medical history was reviewed with Edy.  Edy  has a past medical history of Abdominal hernia (not sure), Abdominal pain (1 month), Anemia (2021), Back pain (2 years), Back problem, Belching (5 years), Cancer (HCC), Constipation, Crohn disease (HCC), Diarrhea, unspecified (recent), Esophageal reflux, Fatigue (3 years), Flatulence/gas pain/belching (40 years), Food intolerance (45 years), Frequent urination (2 years), Heartburn, Hemorrhoids (3 months), High cholesterol, History of COVID-19 (12/2021), Irregular bowel habits (recent), Leaking of urine (20 years), Mouth sores (30 years), Nausea (1 month), Night sweats (2 years), Osteoporosis (10 years ago), Problems with swallowing (1977), Stool incontinence (last year), Visual impairment, Vomiting (1 month), and Wears glasses (67 years).  He  has a past surgical history that includes other; colonoscopy (2012); Colectomy (1977); sigmoidoscopy,diagnostic (20 years ago?); bowel resection (1977); tonsillectomy (1955); colonoscopy (N/A, 04/15/2022); and needle biopsy liver (2024).    ASSESSMENT  Edy presents to physical therapy evaluation with primary c/o rectal prolapse. incomplete bowel emptying. The results of the objective tests and measures show postural deviation, poor abdominal wall integrity, weakness of  hip,and abdominal muscles, poor core muscles strength, decreased muscles flexibility, decreased lumbopelvic stability, PF muscles weakness, PF  muscles tightness, decreased PF soft tissues mobility. Functional deficits include but are not limited to not physically, affects socialization, leaving the house  for long periods. Signs and symptoms are consistent with diagnosis of Rectal prolapse (K62.3). Pt and PT discussed evaluation findings, pathology, POC and HEP.  Pt voiced understanding and performs HEP correctly without reported pain. Skilled Physical Therapy is medically necessary to address the above impairments and reach functional goals.    OBJECTIVE:   Musculoskeletal  Posture: Posture: FHP, decreased lumbar lordosis, posterior pelvic tilt   Pelvic Alignment: L anterior ilial rotation, L pelvis lower   External Palpation: non tender   Scars (location/surgery): Long horizontal scar across the lower abdomen  Abdominal Wall Integrity: Poor   Diastasis Recti (finger width depth while contracted):    Above Umbilicus: 0    Umbilicus: 0    Below Umbilicus: 0   Special Tests: ASLR - poor lumbar loading transfers    Informed consent for internal pelvic evaluation given:Yes     External Observation:   Voluntary contraction: present   Voluntary relaxation: present   Involuntary contraction: absent   Involuntary relaxation: present     Internal Examination   Scar: n/a    Pelvic Floor Muscle strength: (PERF= Power/Endurance/Reps/Fast) MMT:  Power Endurance REPS Fast   0/5           External Anal Sphincter: noted presence of hemorhoids around the anal web,3/5, severe tightness   Accessory Muscle Use: gluteals; abdominals       Eccentric lengthening contraction: Poor   Bearing down Valsalva Maneuver (2-3x):       Internal Palpation:  WNL except  Superficial Transverse Perineal      Bulbocavernosus      Ischiocavernosus     Deep Transverse Perineal               Pubococcygeus/Puborectalis -- (Unable to fully insert for assessment due to severe resistance)   Iliococcygeus -- (Unable to fully insert for assessment due to severe resistance)   Coccygeus -- (Unable to  fully insert for assessment due to severe resistance)   Piriformis (palpated externally)      Obturator Internus      Pelvic Clock       ANI ROM WNL and Strength (5/5) except below:  (* denotes performed with pain)  Trunk ROM MMT (-/5)     Flex 50% LOM 2/5 (Transversus abdominis 2/5)     Ext 50% LOM 2/5 (Multifidi 2/5)    R L R L     Side bend             Rotation           ,   Hip   ROM MMT (-/5)    R L R L     Flex (L2)             Ext              Abd             ER   Mild LOM         IR               Flexibility:  LE Flexibility R L     Hip Flexor severe restrictions severe restrictions     Hamstrings severe restrictions severe restrictions     ITB moderate restrictions severe restrictions     Piriformis moderate restrictions severe restrictions     Quads         Gastroc-soleus           Neurological     Sensory/Reflex:  Vestibule:   N/a  Anal Boswell: normal bilaterally     Balance and Functional Mobility  Gait: pt ambulates on level ground with normal mechanics.         Today's Treatment and Response:   Pt education was provided on exam findings, treatment diagnosis, treatment plan, expectations, and prognosis.  Today's Treatment       1/8/2025   Treatment   Evaluation Min 50   Total of Timed Procedures 0   Total of Service Based 50   Total Treatment Time 50          No data to display                 Patient was instructed in and issued a HEP for:   Improved awareness for pelvic floor muscles. Educated Education provided on pelvic floor and pelvic organ anatomy and function (with use of model). Educated patient on anatomy, normal bowel mechanics. Discussed common contributing factors to pelvic organ prolapse and management of symptoms via PFM strengthening, as well as improved pressure management with functional lifting, bowel movements, etc.      Charges:  PT EVAL  ,     In agreement with evaluation findings and clinical rationale, this evaluation involved HIGH COMPLEXITY decision making due to 3 or more  personal factors/comorbidities, 4 or more body structures involved/activity limitations, and unstable symptoms as documented in the evaluation.                                                                     PLAN OF CARE:    Goals: (to be met in 10 visits)   Goals       Therapy Goals      Not Met Progress Toward Partially Met Met   Patient will have increased awareness for TA/PF isolation to promote contraction and relaxation, and facilitate activation for normal bowel patterns [] [] [] []   Patient will demonstrate decreased muscles tightness, decreased tenderness, with improve soft tissues mobility to allow PF muscles relaxation for ease of defecation, and fecal passage [] [] [] []   Patient will demonstrate ability to perform PF muscles contractions in varied positions, supine, sitting and standing to facilitate PF muscles strength, improve  pelvic floor stability and decrease rectal pressure  [] [] [] []   Patient will improve PERF score to at least 3/10/10//10 for improved pelvic floor strength and pelvic organ support. [] [] [] []   Patient will have improvement of hip, abdominal and core muscles strength to 4/5 to promote lumbopelvic stability, and decrease episodes of rectal prolapse [] [] [] []   Patient will demonstrate ability to coordinate a PF contraction with exhalation for improved pressure management with functional lifting.   [] [] [] []    Patient will verbalize increase in knowledge and understanding of bladder/ bowel/ pelvic mechanics, report regular compliance of established HEP,  to manage symptoms,  and future exacerbations. [] [] [] []   Patient to verbalize knowledge on activities that can aggravate pelvic organ prolapse including chronic straining during urination or BM, or heavy lifting and strenuous activities, and ability to use breathing techniques to avoid valsalva and proper body mechanics.  [] [] [] []                   Frequency / Duration: Patient will be seen 1x/week or a  total of 10  visits over a 90 day period. Treatment will include: Manual Therapy; Neuromuscular Re-education; Therapeutic Activities; Therapeutic Exercise; Home Exercise Program instruction (PFMT, core strengthening, internal on 5th and 10th visit)    Education or treatment limitation: None   Rehab Potential: good          Patient/Family/Caregiver was advised of these findings, precautions, and treatment options and has agreed to actively participate in planning and for this course of care.    Thank you for your referral. Please co-sign or sign and return this letter via fax as soon as possible to [phone]. If you have any questions, please contact me at Dept: [phone]    Sincerely,  Electronically signed by therapist: Radha Justice PT  Physician's certification required: Yes  I certify the need for these services furnished under this plan of treatment and while under my care.    X___________________________________________________ Date____________________    Certification From: 1/8/2025  To:4/8/2025

## 2025-01-13 ENCOUNTER — OFFICE VISIT (OUTPATIENT)
Dept: PHYSICAL THERAPY | Age: 75
End: 2025-01-13
Attending: STUDENT IN AN ORGANIZED HEALTH CARE EDUCATION/TRAINING PROGRAM
Payer: MEDICARE

## 2025-01-13 PROCEDURE — 97112 NEUROMUSCULAR REEDUCATION: CPT | Performed by: PHYSICAL THERAPIST

## 2025-01-13 PROCEDURE — 97110 THERAPEUTIC EXERCISES: CPT | Performed by: PHYSICAL THERAPIST

## 2025-01-13 NOTE — PROGRESS NOTES
Patient: Edy Deshpande III (74 year old, male) Referring Provider:  Insurance:   Diagnosis: Rectal prolapse (K62.3) Giancarlo Wilder  MEDICARE   Date of Surgery: n/a Next MD visit:  COMMERCIAL   Precautions:  None 2/4/2024 Referral Information:    Date of Evaluation: Req: 0, Auth: 0, Exp:     01/08/25 POC Auth Visits:  No data recorded       Today's Date   1/13/2025    Subjective  It was bad today, he sat down on the toilet for 1 1/2 hours, it did not feel that he was done, the stool was soft, and it is hard to clean up. Taking benefiber 1x a day.       Pain: 4/10 (discomfort, not pain)     Objective  see flowsheet for details. Postural deviation, decreased lumbar lordosis, R pelvis higher     Assessment  Initiated therapeutic exercises today. Discussed about using diaphragmatic breaything for BM. Palpatedexetrenally PF contractions with exercises. Treatment time includes explanation of intended effect of each intervention, instruction before and during exercises, including cues for proper form and performance, assessment of patient's response to each activity and education to improve performance of active intervention and good comprehension of treatment plan to improve patient participation and compliance. Intervention adjusted to patient's tolerance throughout session duration.      Goals (to be met in 10 visits)   Goals       Therapy Goals      Not Met Progress Toward Partially Met Met   Patient will have increased awareness for TA/PF isolation to promote contraction and relaxation, and facilitate activation for normal bowel patterns [] [] [] []   Patient will demonstrate decreased muscles tightness, decreased tenderness, with improve soft tissues mobility to allow PF muscles relaxation for ease of defecation, and fecal passage [] [] [] []   Patient will demonstrate ability to perform PF muscles contractions in varied positions, supine, sitting and standing to facilitate PF muscles strength, improve  pelvic  floor stability and decrease rectal pressure  [] [] [] []   Patient will improve PERF score to at least 3/10/10//10 for improved pelvic floor strength and pelvic organ support. [] [] [] []   Patient will have improvement of hip, abdominal and core muscles strength to 4/5 to promote lumbopelvic stability, and decrease episodes of rectal prolapse [] [] [] []   Patient will demonstrate ability to coordinate a PF contraction with exhalation for improved pressure management with functional lifting.   [] [] [] []    Patient will verbalize increase in knowledge and understanding of bladder/ bowel/ pelvic mechanics, report regular compliance of established HEP,  to manage symptoms,  and future exacerbations. [] [] [] []   Patient to verbalize knowledge on activities that can aggravate pelvic organ prolapse including chronic straining during urination or BM, or heavy lifting and strenuous activities, and ability to use breathing techniques to avoid valsalva and proper body mechanics.  [] [] [] []                  Plan  Continue PT as per established POC    Treatment Last 4 Visits       1/8/2025 1/13/2025   Treatment   Treatment Day  2   Therapeutic Exercise    Nu-step, L3 , 5 mins  PF contractions with HR x 10  Calf stretches on incline 2x 30 secs  Piriformis stretches 2x 30 secs (fig 4 no lift)  Modified Anders stretches 2x 30 secs  LTR x 10  TA brace x 10 with 5 secs  Diaphragmatic breathing x 3 mins     Neuro Re-Ed   Pelvic Floor (PF) muscles and Transversus Abdominis (TA) muscles control, movement of abdominal muscles with diaphragmatic breathing, improve awareness for contractions and relaxations, improve coordination with respiration using digital/tactile cues, verbal cues, external palpation:    Supine: Isolated PF contractions x 10 reps with 2-3 secs hold, 10 secs relaxations, Isolated rapid contractions x 10       Additional Treatment Improved awareness for pelvic floor muscles. Educated Education provided on pelvic  floor and pelvic organ anatomy and function (with use of model). Educated patient on anatomy, normal bowel mechanics. Discussed common contributing factors to pelvic organ prolapse and management of symptoms via PFM strengthening, as well as improved pressure management with functional lifting, bowel movements, etc.      Therapeutic Exercise Min  30   Neuro Re-Ed Min  10   Evaluation Min 50    Total of Timed Procedures 0 40   Total of Service Based 50 0   Total Treatment Time 50 40         HEP      Access Code: [email or web address]  URL: [email or web address]  Date: 01/13/2025  Prepared by: Radha Justice    Exercises  - Gastroc Stretch on Wall  - 1 x daily - 7 x weekly - 1 sets - 3 reps - 30 hold  - Heel Raises with Counter Support  - 1 x daily - 7 x weekly - 1-2 sets - 10 reps  - Supine Figure 4 Piriformis Stretch  - 1 x daily - 7 x weekly - 1 sets - 3 reps - 30 hold  - Modified Anders Stretch  - 1 x daily - 7 x weekly - 1 sets - 3 reps - 30 hold  - Supine Lower Trunk Rotation  - 1 x daily - 7 x weekly - 1-2 sets - 10 reps  - Supine Transversus Abdominis Bracing - Hands on Stomach  - 1 x daily - 7 x weekly - 1 sets - 10 reps - 5 hold  - Supine Pelvic floor contractions and relaxations  - 1 x daily - 7 x weekly - 1 sets - 10 reps - 4 hold - 10 secs relaxation  - Quick Flick Pelvic Floor Contractions in Hooklying  - 1 x daily - 7 x weekly - 1 sets - 10 reps  - Supine Diaphragmatic Breathing  - 1 x daily - 7 x weekly - 1 sets - 10 reps - 4 hold - 4 secs relaxation - 4 secs rest  - Seated Diaphragmatic Breathing  - 2 x daily - 7 x weekly - 1 sets - 10 reps - 4 hold - 4 secs release  - 4 secs rest

## 2025-01-21 ENCOUNTER — APPOINTMENT (OUTPATIENT)
Dept: PHYSICAL THERAPY | Age: 75
End: 2025-01-21
Attending: STUDENT IN AN ORGANIZED HEALTH CARE EDUCATION/TRAINING PROGRAM
Payer: MEDICARE

## 2025-01-21 PROCEDURE — 97110 THERAPEUTIC EXERCISES: CPT | Performed by: PHYSICAL THERAPIST

## 2025-01-21 PROCEDURE — 97112 NEUROMUSCULAR REEDUCATION: CPT | Performed by: PHYSICAL THERAPIST

## 2025-01-21 NOTE — PROGRESS NOTES
Patient: Edy Deshpande III (74 year old, male) Referring Provider:  Insurance:   Diagnosis: Rectal prolapse (K62.3) Giancarlo Wilder  MEDICARE   Date of Surgery: n/a Next MD visit:  COMMERCIAL   Precautions:  None 2/4/2024 Referral Information:    Date of Evaluation: Req: 0, Auth: 0, Exp:     01/08/25 POC Auth Visits:          Today's Date   1/21/2025    Subjective  No BM today, but yesterday, he had 2 episodes of prolpase, it was out for about 1 1/2 hours. It was uncomfortable. Yesterday, stool was loose thats why it was hard for him to clean up. He does not know why he has loose stools. Also had ARM done last Wednesday. After       Pain: 4/10 (discomfort, not pain)     Objective  see flowsheet for details. Postural deviation, decreased lumbar lordosis, R pelvis higher         Assessment  Patient tolerated treatment well. Reviewed initial home exercise program. Discussed about filling out bowel/ diet diary to find patterns. PT guided patient through treatment providing visual, verbal, and tactile cues for proper joint alignment/ body positioning, exercise tempo for desired treatment effect of exercises, and assessed response to interventions. Intervention adjusted to patient's tolerance throughout session duration. Discussed importance of each intervention to maximize treatment effect.    Goals (to be met in 10 visits)   Goals       Therapy Goals      Not Met Progress Toward Partially Met Met   Patient will have increased awareness for TA/PF isolation to promote contraction and relaxation, and facilitate activation for normal bowel patterns [] [] [] []   Patient will demonstrate decreased muscles tightness, decreased tenderness, with improve soft tissues mobility to allow PF muscles relaxation for ease of defecation, and fecal passage [] [] [] []   Patient will demonstrate ability to perform PF muscles contractions in varied positions, supine, sitting and standing to facilitate PF muscles strength, improve  pelvic  floor stability and decrease rectal pressure  [] [] [] []   Patient will improve PERF score to at least 3/10/10//10 for improved pelvic floor strength and pelvic organ support. [] [] [] []   Patient will have improvement of hip, abdominal and core muscles strength to 4/5 to promote lumbopelvic stability, and decrease episodes of rectal prolapse [] [] [] []   Patient will demonstrate ability to coordinate a PF contraction with exhalation for improved pressure management with functional lifting.   [] [] [] []    Patient will verbalize increase in knowledge and understanding of bladder/ bowel/ pelvic mechanics, report regular compliance of established HEP,  to manage symptoms,  and future exacerbations. [] [] [] []   Patient to verbalize knowledge on activities that can aggravate pelvic organ prolapse including chronic straining during urination or BM, or heavy lifting and strenuous activities, and ability to use breathing techniques to avoid valsalva and proper body mechanics.  [] [] [] []                  Plan  Continue PT as per established POC. Discuss bowel diary.    Treatment Last 4 Visits       1/8/2025 1/13/2025 1/21/2025   Treatment   Treatment Day  2 3   Therapeutic Exercise    Nu-step, L3 , 5 mins  PF contractions with HR x 10  Calf stretches on incline 2x 30 secs  Piriformis stretches 2x 30 secs (fig 4 no lift)  Modified Anders stretches 2x 30 secs  LTR x 10  TA brace x 10 with 5 secs  Diaphragmatic breathing x 3 mins        Nu-step, L3 , 5 mins  PF contractions with HR x 10  Calf stretches on incline 2x 30 secs  Piriformis stretches 2x 30 secs (fig 4 no lift)  Attempted supine and standing psoas stretches but patient does not feel any stretch, so was discontinued  LTR x 10          Neuro Re-Ed   Pelvic Floor (PF) muscles and Transversus Abdominis (TA) muscles control, movement of abdominal muscles with diaphragmatic breathing, improve awareness for contractions and relaxations, improve coordination with  respiration using digital/tactile cues, verbal cues, external palpation:    Supine: Isolated PF contractions x 10 reps with 2-3 secs hold, 10 secs relaxations, Isolated rapid contractions x 10        Neuro Re-Ed   Pelvic Floor (PF) muscles and Transversus Abdominis (TA) muscles control, movement of abdominal muscles with diaphragmatic breathing, improve awareness for contractions and relaxations, improve coordination with respiration using digital/tactile cues, verbal cues, external palpation in supine:      TA brace x 5 mins, coordinate with exhalation  Supine: Isolated PF contractions x 10 reps with 2-3 secs hold, 10 secs relaxations, Isolated rapid contractions x 10    Seated PF contractions   Additional Treatment Improved awareness for pelvic floor muscles. Educated Education provided on pelvic floor and pelvic organ anatomy and function (with use of model). Educated patient on anatomy, normal bowel mechanics. Discussed common contributing factors to pelvic organ prolapse and management of symptoms via PFM strengthening, as well as improved pressure management with functional lifting, bowel movements, etc.       Therapeutic Exercise Min  30 15   Neuro Re-Ed Min  10 30   Evaluation Min 50     Total of Timed Procedures 0 40 45   Total of Service Based 50 0 0   Total Treatment Time 50 40 45         HEP  Access Code: [email or web address]  URL: [email or web address]  Date: 01/21/2025  Prepared by: Radha Justice    Exercises  - Gastroc Stretch on Wall  - 1 x daily - 7 x weekly - 1 sets - 3 reps - 30 hold  - Heel Raises, with TA brace, PF contractions and breathing out  - 1 x daily - 7 x weekly - 1-2 sets - 10 reps  - Supine Figure 4 Piriformis Stretch  - 1 x daily - 7 x weekly - 1 sets - 3 reps - 30 hold  - Supine Lower Trunk Rotation  - 1 x daily - 7 x weekly - 1-2 sets - 10 reps  - Supine Transversus Abdominis Bracing - Hands on Stomach  - 1 x daily - 7 x weekly - 1 sets - 10 reps - 5 hold  - Supine Pelvic  floor contractions and relaxations  - 1 x daily - 7 x weekly - 1 sets - 10 reps - 4 hold - 10 secs relaxation  - Quick Flick Pelvic Floor Contractions in Hooklying  - 1 x daily - 7 x weekly - 1 sets - 10 reps  - Supine Diaphragmatic Breathing  - 1 x daily - 7 x weekly - 1 sets - 10 reps - 4 hold - 4 secs relaxation - 4 secs rest  - Seated Diaphragmatic Breathing  - 2 x daily - 7 x weekly - 1 sets - 10 reps - 4 hold - 4 secs release  - 4 secs rest  - Seated Pelvic Floor Contraction  - 2 x daily - 7 x weekly - 1 sets - 10 reps - 5 hold      Charges           Thera ex - 1, NMR-2

## 2025-01-28 ENCOUNTER — LAB ENCOUNTER (OUTPATIENT)
Dept: LAB | Facility: HOSPITAL | Age: 75
End: 2025-01-28
Attending: INTERNAL MEDICINE
Payer: MEDICARE

## 2025-01-28 ENCOUNTER — OFFICE VISIT (OUTPATIENT)
Dept: PHYSICAL THERAPY | Age: 75
End: 2025-01-28
Attending: STUDENT IN AN ORGANIZED HEALTH CARE EDUCATION/TRAINING PROGRAM
Payer: MEDICARE

## 2025-01-28 DIAGNOSIS — R79.89 ABNORMAL LIVER FUNCTION TESTS: ICD-10-CM

## 2025-01-28 LAB
ALBUMIN SERPL-MCNC: 3.7 G/DL (ref 3.2–4.8)
ALP LIVER SERPL-CCNC: 61 U/L
ALT SERPL-CCNC: 44 U/L
AST SERPL-CCNC: 41 U/L (ref ?–34)
BILIRUB DIRECT SERPL-MCNC: 0.1 MG/DL (ref ?–0.3)
BILIRUB SERPL-MCNC: 0.2 MG/DL (ref 0.2–1.1)
PROT SERPL-MCNC: 6.2 G/DL (ref 5.7–8.2)

## 2025-01-28 PROCEDURE — 80076 HEPATIC FUNCTION PANEL: CPT

## 2025-01-28 PROCEDURE — 36415 COLL VENOUS BLD VENIPUNCTURE: CPT

## 2025-01-28 PROCEDURE — 97112 NEUROMUSCULAR REEDUCATION: CPT | Performed by: PHYSICAL THERAPIST

## 2025-01-29 NOTE — PROGRESS NOTES
Patient: Edy Deshpande III (74 year old, male) Referring Provider:  Insurance:   Diagnosis: Rectal prolapse (K62.3) Giancarlo Wilder  MEDICARE   Date of Surgery: n/a Next MD visit:  COMMERCIAL   Precautions:  None 2/4/2024 Referral Information:    Date of Evaluation: Req: 0, Auth: 0, Exp:     01/08/25 POC Auth Visits:          Today's Date   1/28/2025    Subjective  Patient reports that he had diarrhea from yesterday, and the only thing that he ate different was breaded orange chicken. It was very uncomfortable because of the prolapse. Its not as bad when stool is well formed but still does not feel completely better. The prolapse occurs everyday.       Pain: 4/10 (discomfort, not pain)     Objective  see flowsheet for details. Postural deviation, decreased lumbar lordosis, R pelvis higher                Assessment  Patient brought his bowel diary and reviewed with patient. Noted that patient is not taking enough fibers in his diet, discussed about increasing it with his food intake, went through list of foods that has high fiber content. Stool was semi formed for most of the week ecxcept for yesterday. Also discussed with patient the foods the tends to manage diarrhea, including BRAT diet. Patient to fill out another 1 week of diet and bowel diary. Reviwed with patient his home exercise program.    Goals (to be met in 10 visits)   Goals       Therapy Goals      Not Met Progress Toward Partially Met Met   Patient will have increased awareness for TA/PF isolation to promote contraction and relaxation, and facilitate activation for normal bowel patterns [] [] [] []   Patient will demonstrate decreased muscles tightness, decreased tenderness, with improve soft tissues mobility to allow PF muscles relaxation for ease of defecation, and fecal passage [] [] [] []   Patient will demonstrate ability to perform PF muscles contractions in varied positions, supine, sitting and standing to facilitate PF muscles strength,  improve  pelvic floor stability and decrease rectal pressure  [] [] [] []   Patient will improve PERF score to at least 3/10/10//10 for improved pelvic floor strength and pelvic organ support. [] [] [] []   Patient will have improvement of hip, abdominal and core muscles strength to 4/5 to promote lumbopelvic stability, and decrease episodes of rectal prolapse [] [] [] []   Patient will demonstrate ability to coordinate a PF contraction with exhalation for improved pressure management with functional lifting.   [] [] [] []    Patient will verbalize increase in knowledge and understanding of bladder/ bowel/ pelvic mechanics, report regular compliance of established HEP,  to manage symptoms,  and future exacerbations. [] [] [] []   Patient to verbalize knowledge on activities that can aggravate pelvic organ prolapse including chronic straining during urination or BM, or heavy lifting and strenuous activities, and ability to use breathing techniques to avoid valsalva and proper body mechanics.  [] [] [] []                  Plan  Continue PT as per established POC. Discuss bowel diary.    Treatment Last 4 Visits       1/8/2025 1/13/2025 1/21/2025 1/28/2025   Treatment   Treatment Day  2 3 4   Therapeutic Exercise    Nu-step, L3 , 5 mins  PF contractions with HR x 10  Calf stretches on incline 2x 30 secs  Piriformis stretches 2x 30 secs (fig 4 no lift)  Modified Anders stretches 2x 30 secs  LTR x 10  TA brace x 10 with 5 secs  Diaphragmatic breathing x 3 mins        Nu-step, L3 , 5 mins  PF contractions with HR x 10  Calf stretches on incline 2x 30 secs  Piriformis stretches 2x 30 secs (fig 4 no lift)  Attempted supine and standing psoas stretches but patient does not feel any stretch, so was discontinued  LTR x 10           Neuro Re-Ed   Pelvic Floor (PF) muscles and Transversus Abdominis (TA) muscles control, movement of abdominal muscles with diaphragmatic breathing, improve awareness for contractions and relaxations,  improve coordination with respiration using digital/tactile cues, verbal cues, external palpation:    Supine: Isolated PF contractions x 10 reps with 2-3 secs hold, 10 secs relaxations, Isolated rapid contractions x 10        Neuro Re-Ed   Pelvic Floor (PF) muscles and Transversus Abdominis (TA) muscles control, movement of abdominal muscles with diaphragmatic breathing, improve awareness for contractions and relaxations, improve coordination with respiration using digital/tactile cues, verbal cues, external palpation in supine:      TA brace x 5 mins, coordinate with exhalation  Supine: Isolated PF contractions x 10 reps with 2-3 secs hold, 10 secs relaxations, Isolated rapid contractions x 10    Seated PF contractions Discussed with patient the changes that occurs with decreased tone and weakness on his pelvic floor muscles, discussed about increasing awareness for bodily functions, especially bowel movements. Reviewed bladder and diet diary.   Additional Treatment Improved awareness for pelvic floor muscles. Educated Education provided on pelvic floor and pelvic organ anatomy and function (with use of model). Educated patient on anatomy, normal bowel mechanics. Discussed common contributing factors to pelvic organ prolapse and management of symptoms via PFM strengthening, as well as improved pressure management with functional lifting, bowel movements, etc.        Therapeutic Exercise Min  30 15    Neuro Re-Ed Min  10 30 28   Evaluation Min 50      Total of Timed Procedures 0 40 45 28   Total of Service Based 50 0 0 0   Total Treatment Time 50 40 45 28         HEP  Access Code: [email or web address]  URL: [email or web address]  Date: 01/21/2025  Prepared by: Radha Justice    Exercises  - Gastroc Stretch on Wall  - 1 x daily - 7 x weekly - 1 sets - 3 reps - 30 hold  - Heel Raises, with TA brace, PF contractions and breathing out  - 1 x daily - 7 x weekly - 1-2 sets - 10 reps  - Supine Figure 4 Piriformis  Stretch  - 1 x daily - 7 x weekly - 1 sets - 3 reps - 30 hold  - Supine Lower Trunk Rotation  - 1 x daily - 7 x weekly - 1-2 sets - 10 reps  - Supine Transversus Abdominis Bracing - Hands on Stomach  - 1 x daily - 7 x weekly - 1 sets - 10 reps - 5 hold  - Supine Pelvic floor contractions and relaxations  - 1 x daily - 7 x weekly - 1 sets - 10 reps - 4 hold - 10 secs relaxation  - Quick Flick Pelvic Floor Contractions in Hooklying  - 1 x daily - 7 x weekly - 1 sets - 10 reps  - Supine Diaphragmatic Breathing  - 1 x daily - 7 x weekly - 1 sets - 10 reps - 4 hold - 4 secs relaxation - 4 secs rest  - Seated Diaphragmatic Breathing  - 2 x daily - 7 x weekly - 1 sets - 10 reps - 4 hold - 4 secs release  - 4 secs rest  - Seated Pelvic Floor Contraction  - 2 x daily - 7 x weekly - 1 sets - 10 reps - 5 hold

## 2025-02-03 ENCOUNTER — OFFICE VISIT (OUTPATIENT)
Dept: PHYSICAL THERAPY | Age: 75
End: 2025-02-03
Attending: STUDENT IN AN ORGANIZED HEALTH CARE EDUCATION/TRAINING PROGRAM
Payer: MEDICARE

## 2025-02-03 PROCEDURE — 97112 NEUROMUSCULAR REEDUCATION: CPT | Performed by: PHYSICAL THERAPIST

## 2025-02-03 PROCEDURE — 97110 THERAPEUTIC EXERCISES: CPT | Performed by: PHYSICAL THERAPIST

## 2025-02-03 NOTE — PROGRESS NOTES
Patient: Edy Deshpande III (74 year old, male) Referring Provider:  Insurance:   Diagnosis: Rectal prolapse (K62.3) Giancarlo Wilder  MEDICARE   Date of Surgery: n/a Next MD visit:  COMMERCIAL   Precautions:  None 2/4/2024 Referral Information:    Date of Evaluation: Req: 0, Auth: 0, Exp:     01/08/25 POC Auth Visits:          Today's Date   2/3/2025    Subjective  \"Last week I was dealing with diarrhea, I do not have that anymore. I did have the prolapse this morning, it  took 45 mins before it can go back in \" This past week was good, most of his stools were formed, no diarrhea. Saw his functional physician recently and was recommended certain shakes, but he has eating food too, just avoiding anything with milk. One time the prolapse did not go out as much and it did not feel too bad.       Pain: 4/10 (discomfort, not pain)     Objective  see flowsheet for details. Postural deviation, decreased lumbar lordosis, R pelvis higher              Assessment  Patient brought his bowel diary again and reviewed with patient. Discussed with patient about avoiding to strain and maintaining well formed stools. Performed exercises to promote core and PF muscles strength. Answered patient's questions regarding need for therapy after surgery and recurrence of prolapse.    Goals (to be met in 10 visits)   Goals       Therapy Goals      Not Met Progress Toward Partially Met Met   Patient will have increased awareness for TA/PF isolation to promote contraction and relaxation, and facilitate activation for normal bowel patterns [] [] [] []   Patient will demonstrate decreased muscles tightness, decreased tenderness, with improve soft tissues mobility to allow PF muscles relaxation for ease of defecation, and fecal passage [] [] [] []   Patient will demonstrate ability to perform PF muscles contractions in varied positions, supine, sitting and standing to facilitate PF muscles strength, improve  pelvic floor stability and decrease  rectal pressure  [] [] [] []   Patient will improve PERF score to at least 3/10/10//10 for improved pelvic floor strength and pelvic organ support. [] [] [] []   Patient will have improvement of hip, abdominal and core muscles strength to 4/5 to promote lumbopelvic stability, and decrease episodes of rectal prolapse [] [] [] []   Patient will demonstrate ability to coordinate a PF contraction with exhalation for improved pressure management with functional lifting.   [] [] [] []    Patient will verbalize increase in knowledge and understanding of bladder/ bowel/ pelvic mechanics, report regular compliance of established HEP,  to manage symptoms,  and future exacerbations. [] [] [] []   Patient to verbalize knowledge on activities that can aggravate pelvic organ prolapse including chronic straining during urination or BM, or heavy lifting and strenuous activities, and ability to use breathing techniques to avoid valsalva and proper body mechanics.  [] [] [] []                  Plan  Continue PT as per established POC.    Treatment Last 4 Visits       1/13/2025 1/21/2025 1/28/2025 2/3/2025   Treatment   Treatment Day 2 3 4 5   Therapeutic Exercise   Nu-step, L3 , 5 mins  PF contractions with HR x 10  Calf stretches on incline 2x 30 secs  Piriformis stretches 2x 30 secs (fig 4 no lift)  Modified Anders stretches 2x 30 secs  LTR x 10  TA brace x 10 with 5 secs  Diaphragmatic breathing x 3 mins        Nu-step, L3 , 5 mins  PF contractions with HR x 10  Calf stretches on incline 2x 30 secs  Piriformis stretches 2x 30 secs (fig 4 no lift)  Attempted supine and standing psoas stretches but patient does not feel any stretch, so was discontinued  LTR x 10         Nu-step, L5 , 5 mins  PF contractions with HR x 20  Calf stretches on incline 2x 30 secs  Piriformis stretches 2x 30 secs (fig 4 no lift)  LTR x 10  TA brace with PF and bridge x 10  SL clams x 10          Neuro Re-Ed  Pelvic Floor (PF) muscles and Transversus  Abdominis (TA) muscles control, movement of abdominal muscles with diaphragmatic breathing, improve awareness for contractions and relaxations, improve coordination with respiration using digital/tactile cues, verbal cues, external palpation:    Supine: Isolated PF contractions x 10 reps with 2-3 secs hold, 10 secs relaxations, Isolated rapid contractions x 10        Neuro Re-Ed   Pelvic Floor (PF) muscles and Transversus Abdominis (TA) muscles control, movement of abdominal muscles with diaphragmatic breathing, improve awareness for contractions and relaxations, improve coordination with respiration using digital/tactile cues, verbal cues, external palpation in supine:      TA brace x 5 mins, coordinate with exhalation  Supine: Isolated PF contractions x 10 reps with 2-3 secs hold, 10 secs relaxations, Isolated rapid contractions x 10    Seated PF contractions Discussed with patient the changes that occurs with decreased tone and weakness on his pelvic floor muscles, discussed about increasing awareness for bodily functions, especially bowel movements. Reviewed bladder and diet diary. Pelvic Floor (PF) muscles and Transversus Abdominis (TA) muscles control, movement of abdominal muscles with diaphragmatic breathing, improve awareness for contractions and relaxations, improve coordination with respiration using digital/tactile cues, verbal cues, external palpation in supine:    TA brace x 10 with 5 secs, coordinate with exhalation  Supine: Isolated PF contractions x 10 reps with 5 secs hold, 10 secs relaxations, Isolated rapid contractions x 10  Coordinated TA and PF x 10     Therapeutic Exercise Min 30 15  25   Neuro Re-Ed Min 10 30 28 13   Total of Timed Procedures 40 45 28 38   Total of Service Based 0 0 0 0   Total Treatment Time 40 45 28 38         HEP  Access Code: [email or web address]  URL: [email or web address]  Date: 01/21/2025  Prepared by: Radha Justice    Exercises  - Gastroc Stretch on Wall  - 1  x daily - 7 x weekly - 1 sets - 3 reps - 30 hold  - Heel Raises, with TA brace, PF contractions and breathing out  - 1 x daily - 7 x weekly - 1-2 sets - 10 reps  - Supine Figure 4 Piriformis Stretch  - 1 x daily - 7 x weekly - 1 sets - 3 reps - 30 hold  - Supine Lower Trunk Rotation  - 1 x daily - 7 x weekly - 1-2 sets - 10 reps  - Supine Transversus Abdominis Bracing - Hands on Stomach  - 1 x daily - 7 x weekly - 1 sets - 10 reps - 5 hold  - Supine Pelvic floor contractions and relaxations  - 1 x daily - 7 x weekly - 1 sets - 10 reps - 4 hold - 10 secs relaxation  - Quick Flick Pelvic Floor Contractions in Hooklying  - 1 x daily - 7 x weekly - 1 sets - 10 reps  - Supine Diaphragmatic Breathing  - 1 x daily - 7 x weekly - 1 sets - 10 reps - 4 hold - 4 secs relaxation - 4 secs rest  - Seated Diaphragmatic Breathing  - 2 x daily - 7 x weekly - 1 sets - 10 reps - 4 hold - 4 secs release  - 4 secs rest  - Seated Pelvic Floor Contraction  - 2 x daily - 7 x weekly - 1 sets - 10 reps - 5 hold    Charges           NMR -1, Thera ex -2

## 2025-02-04 ENCOUNTER — OFFICE VISIT (OUTPATIENT)
Facility: LOCATION | Age: 75
End: 2025-02-04
Payer: MEDICARE

## 2025-02-04 VITALS
HEIGHT: 67 IN | WEIGHT: 138 LBS | DIASTOLIC BLOOD PRESSURE: 54 MMHG | TEMPERATURE: 97 F | BODY MASS INDEX: 21.66 KG/M2 | OXYGEN SATURATION: 99 % | HEART RATE: 57 BPM | SYSTOLIC BLOOD PRESSURE: 106 MMHG

## 2025-02-04 DIAGNOSIS — K62.3 RECTAL PROLAPSE: Primary | ICD-10-CM

## 2025-02-04 PROCEDURE — 99214 OFFICE O/P EST MOD 30 MIN: CPT | Performed by: STUDENT IN AN ORGANIZED HEALTH CARE EDUCATION/TRAINING PROGRAM

## 2025-02-05 ENCOUNTER — TELEPHONE (OUTPATIENT)
Facility: LOCATION | Age: 75
End: 2025-02-05

## 2025-02-05 DIAGNOSIS — K62.3 RECTAL PROLAPSE: Primary | ICD-10-CM

## 2025-02-05 NOTE — PROGRESS NOTES
Follow Up Visit Note       Active Problems      1. Rectal prolapse          Chief Complaint   Chief Complaint   Patient presents with    Follow - Up     EP - 2 month follow up- rectal prolapse. Pelvic floor PT.         History of Present Illness  This is a very nice 74-year-old gentleman referred to me from Dr. Tapia of [facility] with concern for rectal prolapse.  Patient does have a history of ileocolonic Crohn's disease and is currently not on any active treatment for this. He has been in endoscopic and histologic remission and underwent a flexible sigmoidoscopy with Dr. Tapia in July. He refused to have a full colonoscopy.  He underwent an ileocecectomy in 1977 at [facility].     Patient has noted intermittent anorectal symptoms which she is attributed to hemorrhoids over the last 20+ years.  The symptoms have been more frequent over the last 6-8 months.  He is currently experiencing prolapse of tissue with almost every bowel movement.  After having a bowel movement, patient states he has to sit on a hard surface and the tissue will go back into his rectum.  He denies any rectal pain and has very rare rectal bleeding.  He is currently taking a twice daily fiber supplement.  He denies any straining lately.  He does feel the sensation of incomplete emptying around 50% of the time.  He denies any itching, drainage or incontinence.  No prior anorectal surgery.    I met the patient on 9/26/2024.  At that time, patient was unable to demonstrate any evidence of full-thickness rectal prolapse.  He did have large, prolapsing internal hemorrhoids.  I have treated a right anterior and left lateral internal hemorrhoid with rubber band ligation and sclerotherapy on 10/1/2024 and 11/7/2024.     Unfortunately, patient continued to have prolapse with every bowel movement.  I last saw the patient on 12/5/2024.  Patient was able to demonstrate a 1 inch segment of full-thickness rectal prolapse on  examination on the commode after Valsalva.  Patient returns for ongoing follow-up today.    Patient is currently seeing Radha Justice of pelvic floor physical therapy.  He is still seeing prolapse with almost every bowel movement.  He has had bouts of diarrhea but this has been better lately.  Patient did have anorectal manometry on 1/15/2025 with findings as described below    Weakened internal anal sphincter with low resting tone  Paradoxical increase in anal sphincter presssure with adequate rectal push pressures are consistent with Type I dyssynergic defecation, with positive balloon expulsion test  Mild rectal hypersensitivity, consider proctitis, IBS  Presence of rectoanal inhibitory reflex excludes Hirschsprung's disease  Recommend pelvic floor biofeedback       Allergies  Edy is allergic to latex.    Past Medical / Surgical / Social / Family History    The past medical and past surgical history have been reviewed by me today.    Past Medical History:    Abdominal hernia    not sure    Abdominal pain    had a recent occurrence    Allergic rhinitis    Anemia    not severe, but continues    Back pain    lower back- felt a pop in back    Back problem    LOWER BACK PAIN    Belching    minor    Cancer (HCC)    skin face- MOHS    Constipation    Crohn disease (HCC)    Diarrhea, unspecified    Esophageal reflux    Fatigue    occasional-late afternoon    Flatulence/gas pain/belching    Food intolerance    lactose intolerant    Frequent urination    Heartburn    Hemorrhoids    very mild    High cholesterol    History of COVID-19    2 day fever, fatigue, not hospitalized    Irregular bowel habits    Leaking of urine    minor amount    Mouth sores    rare    Nausea    had a recent occurrence    Night sweats    Osteoporosis    osteoperosis    Problems with swallowing    no longer a problem    Skin cancer    Mohs Surgery-[facility]    Stool incontinence    minor and infrequent    Visual impairment    glasses    Vomiting     had a recent occurrence    Wears glasses     Past Surgical History:   Procedure Laterality Date    Bowel resection  1977    Colectomy  1977    Colonoscopy  2012    Colonoscopy N/A 04/15/2022    Procedure: COLONOSCOPY;  Surgeon: Juan Antonio Tapia MD;  Location:  ENDOSCOPY    Hemorrhoidectomy  1977 with bowel resection    Needle biopsy liver  2024    Other      Abdominal surgery 1977    Other surgical history  Crohn's Disease 1977    Sigmoidoscopy,diagnostic  20 years ago?    Tonsillectomy  1955       The family history and social history have been reviewed by me today.    Family History   Problem Relation Age of Onset    Colon Cancer Maternal Uncle         Cody    Crohn's Disease Sister         Bettina    Diabetes Brother         Sell    Hypertension Brother         Sell    Heart Attack Mother         Sell    Stroke Father         Sell     Social History     Socioeconomic History    Marital status:    Tobacco Use    Smoking status: Never    Smokeless tobacco: Never   Vaping Use    Vaping status: Never Used   Substance and Sexual Activity    Alcohol use: Yes     Alcohol/week: 1.0 standard drink of alcohol     Types: 1 Glasses of wine per week     Comment: occasionally    Drug use: Never   Other Topics Concern    Caffeine Concern No    Exercise Yes    Seat Belt Yes    Special Diet Yes     Comment: no lactose    Stress Concern No    Weight Concern No        Current Outpatient Medications:     ergocalciferol 1.25 MG (46466 UT) Oral Cap, Take 1 capsule (50,000 Units total) by mouth twice a week. (Patient not taking: Reported on 2/4/2025), Disp: 24 capsule, Rfl: 0    Cholecalciferol (VITAMIN D3) [id number] UNIT/GM Does not apply Liquid, , Disp: , Rfl:     Turmeric 500 MG Oral Cap, Take by mouth daily., Disp: , Rfl:     Vitamin C 500 MG Oral Tab, Take 1 tablet (500 mg total) by mouth daily., Disp: , Rfl:     Omega-3 Fatty Acids (FISH OIL) 500 MG Oral Cap, Take by mouth., Disp: , Rfl:     B Complex Vitamins  (VITAMIN B COMPLEX OR), Take by mouth As Directed. (Patient not taking: Reported on 2/4/2025), Disp: , Rfl:     multivitamin Oral Tab, Take 1 tablet by mouth daily. (Patient not taking: Reported on 2/4/2025), Disp: , Rfl:      Review of Systems  A 10 point review of systems was performed and negative unless otherwise documented per HPI.     Physical Findings   /54 (BP Location: Left arm, Patient Position: Sitting, Cuff Size: adult)   Pulse 57   Temp 97.1 °F (36.2 °C) (Temporal)   Ht 67\"   Wt 138 lb (62.6 kg)   SpO2 99%   BMI 21.61 kg/m²   Physical Exam  Vitals and nursing note reviewed. Exam conducted with a chaperone present.   Constitutional:       General: He is not in acute distress.  HENT:      Head: Normocephalic and atraumatic.      Mouth/Throat:      Mouth: Mucous membranes are moist.   Cardiovascular:      Rate and Rhythm: Normal rate and regular rhythm.   Pulmonary:      Effort: Pulmonary effort is normal.   Abdominal:      General: There is no distension.      Palpations: Abdomen is soft.      Tenderness: There is no abdominal tenderness.   Genitourinary:     Comments: Repeat anorectal examination deferred today  Musculoskeletal:         General: No deformity.   Skin:     General: Skin is warm and dry.   Neurological:      General: No focal deficit present.      Mental Status: He is alert.   Psychiatric:         Mood and Affect: Mood normal.          Assessment   1. Rectal prolapse      This is a very nice 74-year-old gentleman referred to me from Dr. Tapia of [facility] with concern for rectal prolapse.  Patient does have a history of ileocolonic Crohn's disease and is currently not on any active treatment for this. He has been in endoscopic and histologic remission and underwent a flexible sigmoidoscopy with Dr. Tapia in July. He refused to have a full colonoscopy.  He underwent an ileocecectomy in 1977 at [facility].     Patient has noted intermittent anorectal  symptoms which she is attributed to hemorrhoids over the last 20+ years.  The symptoms have been more frequent over the last 6-8 months.  He is currently experiencing prolapse of tissue with almost every bowel movement.  After having a bowel movement, patient states he has to sit on a hard surface and the tissue will go back into his rectum.  He denies any rectal pain and has very rare rectal bleeding.  He is currently taking a twice daily fiber supplement.  He denies any straining lately.  He does feel the sensation of incomplete emptying around 50% of the time.  He denies any itching, drainage or incontinence.  No prior anorectal surgery.    I met the patient on 9/26/2024.  At that time, patient was unable to demonstrate any evidence of full-thickness rectal prolapse.  He did have large, prolapsing internal hemorrhoids.  I have treated a right anterior and left lateral internal hemorrhoid with rubber band ligation and sclerotherapy on 10/1/2024 and 11/7/2024.     Unfortunately, patient continued to have prolapse with every bowel movement.  I last saw the patient on 12/5/2024.  Patient was able to demonstrate a 1 inch segment of full-thickness rectal prolapse on examination on the commode after Valsalva.  Patient returns for ongoing follow-up today.    Patient is currently seeing Radha Justice of pelvic floor physical therapy.  He is still seeing prolapse with almost every bowel movement.  He has had bouts of diarrhea but this has been better lately.  Patient did have anorectal manometry on 1/15/2025 with findings as described below    Weakened internal anal sphincter with low resting tone  Paradoxical increase in anal sphincter presssure with adequate rectal push pressures are consistent with Type I dyssynergic defecation, with positive balloon expulsion test  Mild rectal hypersensitivity, consider proctitis, IBS  Presence of rectoanal inhibitory reflex excludes Hirschsprung's disease  Recommend pelvic floor  biofeedback     Plan   I counseled patient that I doubt the prolapse will fully resolve without surgical treatment.  However, I do think think the patient would benefit from pelvic floor physical therapy to treat his obstructive defecation symptoms.    I had an extensive conversation with the patient explaining that rectal prolapse can be repaired through abdominal and perineal approaches.  I would favor an abdominal approach as this approach generally has less risk of recurrence.  My personal preference would be to perform a robotic suture rectopexy.  The details of this procedure were discussed including the expected recovery time, risks, benefits and alternatives. Specific risks discussed included but not limited to bleeding, infection, damage to the rectum, bowel, ureter, possible need for conversion to open procedure and possible recurrent prolapse.     Patient wished to tentatively schedule surgery with me.  Surgery has been scheduled for 4/2/2025.  He will continue to work with Radha Justice of pelvic floor physical therapy.  Patient also expressed interest in obtaining a second opinion from the [facility].  I will plan to touch base with the patient again prior to scheduled surgery on 3/10/2025.  Patient is welcome to contact me in the meantime with any new or worsening symptoms.     No orders of the defined types were placed in this encounter.      Imaging & Referrals   SURGERY - EXTERNAL    Follow Up  No follow-ups on file.    Giancarlo Wilder MD

## 2025-02-11 ENCOUNTER — OFFICE VISIT (OUTPATIENT)
Dept: PHYSICAL THERAPY | Age: 75
End: 2025-02-11
Attending: STUDENT IN AN ORGANIZED HEALTH CARE EDUCATION/TRAINING PROGRAM
Payer: MEDICARE

## 2025-02-11 PROCEDURE — 97110 THERAPEUTIC EXERCISES: CPT | Performed by: PHYSICAL THERAPIST

## 2025-02-11 PROCEDURE — 97112 NEUROMUSCULAR REEDUCATION: CPT | Performed by: PHYSICAL THERAPIST

## 2025-02-11 NOTE — PROGRESS NOTES
Patient: Edy Desphande III (74 year old, male) Referring Provider:  Insurance:   Diagnosis: Rectal prolapse (K62.3) Giancarlo Wilder  MEDICARE   Date of Surgery: n/a Next MD visit:  COMMERCIAL   Precautions:  None 2/4/2024 Referral Information:    Date of Evaluation: Req: 0, Auth: 0, Exp:     01/08/25 POC Auth Visits:          Today's Date   2/11/2025    Subjective  This week, my stool has been formed, so it did not present as much problems, it usually can be a problem when he has diarrhea.       Pain: 0/10 (LB comes and goes, certain exercises can trigger it)     Objective  Postural deviation, decreased lumbar lordosis, R pelvis higher. During internal digital insertion, noted decreased anal resistance, was able to perform full digital insertion              Informed consent for internal pelvic evaluation given: Yes    Pelvic Floor Muscle strength: (PERF= Power/Endurance/Reps/Fast) MMT: 3/5/5//5  External Anal Sphincter: 3/5  Accessory Muscle Use: abdominals    Eccentric lengthening contraction: Not performed      Internal Palpation: WNL except Superficial Transverse Perineal B moderate restriction and tenderness  Deep Transverse Perineal B moderate restriction and tenderness  Pubococcygeus/Puborectalis Bmoderate restriction  Iliococcygeus B minimal restriction  Coccygeus B minimal restriction        Assessment  Patient tolerated treatment well. Added new exercises to promote core muscles and hip muscles strength. Patient is able to tolerate digital anal insertion today, there is decreased resistance, noted decreased muscles tightness. PAtient was able to perform fair pelvic floor muscles contractions.  Noted PT guided patient through treatment providing visual, verbal, and tactile cues for proper joint alignment/ body positioning, exercise tempo for desired treatment effect of exercises, and assessed response to interventions. Intervention adjusted to patient's tolerance throughout session duration. Discussed  importance of each intervention to maximize treatment effect.    Goals (to be met in 10 visits)   Goals       Therapy Goals      Not Met Progress Toward Partially Met Met   Patient will have increased awareness for TA/PF isolation to promote contraction and relaxation, and facilitate activation for normal bowel patterns [] [] [] []   Patient will demonstrate decreased muscles tightness, decreased tenderness, with improve soft tissues mobility to allow PF muscles relaxation for ease of defecation, and fecal passage [] [] [] []   Patient will demonstrate ability to perform PF muscles contractions in varied positions, supine, sitting and standing to facilitate PF muscles strength, improve  pelvic floor stability and decrease rectal pressure  [] [] [] []   Patient will improve PERF score to at least 3/10/10//10 for improved pelvic floor strength and pelvic organ support. [] [] [] []   Patient will have improvement of hip, abdominal and core muscles strength to 4/5 to promote lumbopelvic stability, and decrease episodes of rectal prolapse [] [] [] []   Patient will demonstrate ability to coordinate a PF contraction with exhalation for improved pressure management with functional lifting.   [] [] [] []    Patient will verbalize increase in knowledge and understanding of bladder/ bowel/ pelvic mechanics, report regular compliance of established HEP,  to manage symptoms,  and future exacerbations. [] [] [] []   Patient to verbalize knowledge on activities that can aggravate pelvic organ prolapse including chronic straining during urination or BM, or heavy lifting and strenuous activities, and ability to use breathing techniques to avoid valsalva and proper body mechanics.  [] [] [] []                  Plan  Continue PT as per established POC, NMR on last session    Treatment Last 4 Visits       1/21/2025 1/28/2025 2/3/2025 2/11/2025   PT Treatment   Treatment Day 3 4 5 6   Therapeutic Exercise      Nu-step, L3 , 5 mins  PF  contractions with HR x 10  Calf stretches on incline 2x 30 secs  Piriformis stretches 2x 30 secs (fig 4 no lift)  Attempted supine and standing psoas stretches but patient does not feel any stretch, so was discontinued  LTR x 10         Nu-step, L5 , 5 mins  PF contractions with HR x 20  Calf stretches on incline 2x 30 secs  Piriformis stretches 2x 30 secs (fig 4 no lift)  LTR x 10  TA brace with PF and bridge x 10  SL clams x 10        Nu-step, L5 , 5 mins  PF contractions with HR x 20  Calf stretches on incline 2x 30 secs  Standing hip flex, ext, add, abd x 10 red TB  Shoulder extensions and paloff press x 20, gray tube  Piriformis stretches 2x 30 secs (fig 4 no lift)  LTR x 20  TA brace with PF and bridge x 20  SL clams x 20         Neuro Re-Ed    Neuro Re-Ed   Pelvic Floor (PF) muscles and Transversus Abdominis (TA) muscles control, movement of abdominal muscles with diaphragmatic breathing, improve awareness for contractions and relaxations, improve coordination with respiration using digital/tactile cues, verbal cues, external palpation in supine:      TA brace x 5 mins, coordinate with exhalation  Supine: Isolated PF contractions x 10 reps with 2-3 secs hold, 10 secs relaxations, Isolated rapid contractions x 10    Seated PF contractions Discussed with patient the changes that occurs with decreased tone and weakness on his pelvic floor muscles, discussed about increasing awareness for bodily functions, especially bowel movements. Reviewed bladder and diet diary. Pelvic Floor (PF) muscles and Transversus Abdominis (TA) muscles control, movement of abdominal muscles with diaphragmatic breathing, improve awareness for contractions and relaxations, improve coordination with respiration using digital/tactile cues, verbal cues, external palpation in supine:    TA brace x 10 with 5 secs, coordinate with exhalation  Supine: Isolated PF contractions x 10 reps with 5 secs hold, 10 secs relaxations, Isolated rapid  contractions x 10  Coordinated TA and PF x 10   Pelvic Floor (PF) muscles and Transversus Abdominis (TA) muscles control, improve awareness for contractions and relaxations, improve coordination with respiration using digital/tactile cues, verbal cues, internal palpation in SL position     Isolated PF contractions  TA brace  x 10 with 5 secs   Therapeutic Exercise Min 15  25 30   Neuro Re-Ed Min 30 28 13 10   Total of Timed Procedures 45 28 38 40   Total of Service Based 0 0 0 0   Total Treatment Time 45 28 38 40         HEP  Access Code: [email or web address]  URL: [email or web address]  Date: 01/21/2025  Prepared by: Radha Justice    Exercises  - Gastroc Stretch on Wall  - 1 x daily - 7 x weekly - 1 sets - 3 reps - 30 hold  - Heel Raises, with TA brace, PF contractions and breathing out  - 1 x daily - 7 x weekly - 1-2 sets - 10 reps  - Supine Figure 4 Piriformis Stretch  - 1 x daily - 7 x weekly - 1 sets - 3 reps - 30 hold  - Supine Lower Trunk Rotation  - 1 x daily - 7 x weekly - 1-2 sets - 10 reps  - Supine Transversus Abdominis Bracing - Hands on Stomach  - 1 x daily - 7 x weekly - 1 sets - 10 reps - 5 hold  - Supine Pelvic floor contractions and relaxations  - 1 x daily - 7 x weekly - 1 sets - 10 reps - 4 hold - 10 secs relaxation  - Quick Flick Pelvic Floor Contractions in Hooklying  - 1 x daily - 7 x weekly - 1 sets - 10 reps  - Supine Diaphragmatic Breathing  - 1 x daily - 7 x weekly - 1 sets - 10 reps - 4 hold - 4 secs relaxation - 4 secs rest  - Seated Diaphragmatic Breathing  - 2 x daily - 7 x weekly - 1 sets - 10 reps - 4 hold - 4 secs release  - 4 secs rest  - Seated Pelvic Floor Contraction  - 2 x daily - 7 x weekly - 1 sets - 10 reps - 5 hold    Charges     Thera ex -2, NMR-1

## 2025-02-14 ENCOUNTER — TELEPHONE (OUTPATIENT)
Facility: LOCATION | Age: 75
End: 2025-02-14

## 2025-02-14 RX ORDER — SODIUM CHLORIDE 9 MG/ML
INJECTION, SOLUTION INTRAVENOUS CONTINUOUS
Status: CANCELLED | OUTPATIENT
Start: 2025-02-14

## 2025-02-14 RX ORDER — METRONIDAZOLE 500 MG/100ML
500 INJECTION, SOLUTION INTRAVENOUS ONCE
Status: CANCELLED | OUTPATIENT
Start: 2025-02-14 | End: 2025-02-14

## 2025-02-14 RX ORDER — SODIUM CHLORIDE, SODIUM LACTATE, POTASSIUM CHLORIDE, CALCIUM CHLORIDE 600; 310; 30; 20 MG/100ML; MG/100ML; MG/100ML; MG/100ML
INJECTION, SOLUTION INTRAVENOUS CONTINUOUS
Status: CANCELLED | OUTPATIENT
Start: 2025-02-14

## 2025-02-14 NOTE — TELEPHONE ENCOUNTER
Spoke to patient after receiving a voicemail about needing any clearances prior to his surgery with Dr Wilder on 3/12/25.  Advised patient that Dr Wilder has not asked for any clearances , but the anesthesiologist may require additional testing and that would be through Pre Admission Testing.  Patient stated that he has a phone appointment with Pre Admission Testing today.  All questions answered.

## 2025-02-17 ENCOUNTER — HOSPITAL ENCOUNTER (OUTPATIENT)
Dept: CT IMAGING | Facility: HOSPITAL | Age: 75
Discharge: HOME | End: 2025-02-17
Attending: INTERNAL MEDICINE
Payer: MEDICARE

## 2025-02-17 ENCOUNTER — TELEPHONE (OUTPATIENT)
Facility: LOCATION | Age: 75
End: 2025-02-17

## 2025-02-17 DIAGNOSIS — R91.1 LUNG NODULE: ICD-10-CM

## 2025-02-17 PROCEDURE — 71250 CT THORAX DX C-: CPT | Performed by: INTERNAL MEDICINE

## 2025-02-17 NOTE — TELEPHONE ENCOUNTER
Cardiac clearance received from Dr Jean Julio.  Per note \"Mr Deshpande is at low risk for his upcoming non-cardiac procedure form a cardiac stand point.\"  Faxed to Preadmission Testing and placed in binder.

## 2025-02-18 ENCOUNTER — OFFICE VISIT (OUTPATIENT)
Dept: PHYSICAL THERAPY | Age: 75
End: 2025-02-18
Attending: STUDENT IN AN ORGANIZED HEALTH CARE EDUCATION/TRAINING PROGRAM
Payer: MEDICARE

## 2025-02-18 PROCEDURE — 97110 THERAPEUTIC EXERCISES: CPT | Performed by: PHYSICAL THERAPIST

## 2025-02-18 PROCEDURE — 97112 NEUROMUSCULAR REEDUCATION: CPT | Performed by: PHYSICAL THERAPIST

## 2025-02-18 NOTE — PROGRESS NOTES
Patient: Edy Deshpande III (74 year old, male) Referring Provider:  Insurance:   Diagnosis: Rectal prolapse (K62.3) Giancarlo Wilder  MEDICARE   Date of Surgery: n/a Next MD visit:  COMMERCIAL   Precautions:  None 2/4/2024 Referral Information:    Date of Evaluation: Req: 0, Auth: 0, Exp:     01/08/25 POC Auth Visits:          Today's Date   2/18/2025    Subjective  Patient reports that he had 1 day that he had a large bowel movement and did not have the prolpase, that randomly occurs.It does take him about about 40 mins to have a BM.       Pain: 0/10     Objective  Postural deviation, decreased lumbar lordosis, R pelvis higher.              Assessment  Patient is responding well to PT. Worked on diaphragmatic breathing supine and sitting, educated on proper sitting when having BM and used a step, utilized external tactile cues for PF to ensure relaxation and promote easier BM.    Goals (to be met in 10 visits)   Goals       Therapy Goals      Not Met Progress Toward Partially Met Met   Patient will have increased awareness for TA/PF isolation to promote contraction and relaxation, and facilitate activation for normal bowel patterns [] [] [] []   Patient will demonstrate decreased muscles tightness, decreased tenderness, with improve soft tissues mobility to allow PF muscles relaxation for ease of defecation, and fecal passage [] [] [] []   Patient will demonstrate ability to perform PF muscles contractions in varied positions, supine, sitting and standing to facilitate PF muscles strength, improve  pelvic floor stability and decrease rectal pressure  [] [] [] []   Patient will improve PERF score to at least 3/10/10//10 for improved pelvic floor strength and pelvic organ support. [] [] [] []   Patient will have improvement of hip, abdominal and core muscles strength to 4/5 to promote lumbopelvic stability, and decrease episodes of rectal prolapse [] [] [] []   Patient will demonstrate ability to coordinate a PF  contraction with exhalation for improved pressure management with functional lifting.   [] [] [] []    Patient will verbalize increase in knowledge and understanding of bladder/ bowel/ pelvic mechanics, report regular compliance of established HEP,  to manage symptoms,  and future exacerbations. [] [] [] []   Patient to verbalize knowledge on activities that can aggravate pelvic organ prolapse including chronic straining during urination or BM, or heavy lifting and strenuous activities, and ability to use breathing techniques to avoid valsalva and proper body mechanics.  [] [] [] []                  Plan  Continue PT as per established POC, NMR on last session    Treatment Last 4 Visits       1/28/2025 2/3/2025 2/11/2025 2/18/2025   PT Treatment   Treatment Day 4 5 6 7   Therapeutic Exercise  Nu-step, L5 , 5 mins  PF contractions with HR x 20  Calf stretches on incline 2x 30 secs  Piriformis stretches 2x 30 secs (fig 4 no lift)  LTR x 10  TA brace with PF and bridge x 10  SL clams x 10        Nu-step, L5 , 5 mins  PF contractions with HR x 20  Calf stretches on incline 2x 30 secs  Standing hip flex, ext, add, abd x 10 red TB  Shoulder extensions and paloff press x 20, gray tube  Piriformis stretches 2x 30 secs (fig 4 no lift)  LTR x 20  TA brace with PF and bridge x 20  SL clams x 20       Nu-step, L5 , 5 mins  PF contractions with HR x 20  Calf stretches on incline 2x 30 secs  Standing hip flex, ext, add, abd x 10 red TB  Shoulder extensions, rows and paloff press x 20, gray tube  Piriformis stretches 2x 30 secs (fig 4 no lift)  LTR x 10  TA brace with PF and bridge x 10  SL clams x 10       Neuro Re-Ed Discussed with patient the changes that occurs with decreased tone and weakness on his pelvic floor muscles, discussed about increasing awareness for bodily functions, especially bowel movements. Reviewed bladder and diet diary. Pelvic Floor (PF) muscles and Transversus Abdominis (TA) muscles control, movement of  abdominal muscles with diaphragmatic breathing, improve awareness for contractions and relaxations, improve coordination with respiration using digital/tactile cues, verbal cues, external palpation in supine:    TA brace x 10 with 5 secs, coordinate with exhalation  Supine: Isolated PF contractions x 10 reps with 5 secs hold, 10 secs relaxations, Isolated rapid contractions x 10  Coordinated TA and PF x 10   Pelvic Floor (PF) muscles and Transversus Abdominis (TA) muscles control, improve awareness for contractions and relaxations, improve coordination with respiration using digital/tactile cues, verbal cues, internal palpation in SL position     Isolated PF contractions  TA brace  x 10 with 5 secs Pelvic Floor (PF) muscles and Transversus Abdominis (TA) muscles control, improve awareness for contractions and relaxations, improve coordination with respiration using digital/tactile cues, verbal cues, internal palpation in SL position   Isolated PF contractions  Diaphragmatic breathing x 3 mins         Therapeutic Exercise Min  25 30 30   Neuro Re-Ed Min 28 13 10 15   Total of Timed Procedures 28 38 40 45   Total of Service Based 0 0 0 0   Total Treatment Time 28 38 40 45         HEP  Access Code: [email or web address]  URL: [email or web address]  Date: 01/21/2025  Prepared by: Radha Justice    Exercises  - Gastroc Stretch on Wall  - 1 x daily - 7 x weekly - 1 sets - 3 reps - 30 hold  - Heel Raises, with TA brace, PF contractions and breathing out  - 1 x daily - 7 x weekly - 1-2 sets - 10 reps  - Supine Figure 4 Piriformis Stretch  - 1 x daily - 7 x weekly - 1 sets - 3 reps - 30 hold  - Supine Lower Trunk Rotation  - 1 x daily - 7 x weekly - 1-2 sets - 10 reps  - Supine Transversus Abdominis Bracing - Hands on Stomach  - 1 x daily - 7 x weekly - 1 sets - 10 reps - 5 hold  - Supine Pelvic floor contractions and relaxations  - 1 x daily - 7 x weekly - 1 sets - 10 reps - 4 hold - 10 secs relaxation  - Quick Flick  Pelvic Floor Contractions in Hooklying  - 1 x daily - 7 x weekly - 1 sets - 10 reps  - Supine Diaphragmatic Breathing  - 1 x daily - 7 x weekly - 1 sets - 10 reps - 4 hold - 4 secs relaxation - 4 secs rest  - Seated Diaphragmatic Breathing  - 2 x daily - 7 x weekly - 1 sets - 10 reps - 4 hold - 4 secs release  - 4 secs rest  - Seated Pelvic Floor Contraction  - 2 x daily - 7 x weekly - 1 sets - 10 reps - 5 hold    Arturo     Thera ex -2, NMR - 1

## 2025-02-25 ENCOUNTER — OFFICE VISIT (OUTPATIENT)
Dept: PHYSICAL THERAPY | Age: 75
End: 2025-02-25
Attending: STUDENT IN AN ORGANIZED HEALTH CARE EDUCATION/TRAINING PROGRAM
Payer: MEDICARE

## 2025-02-25 PROCEDURE — 97112 NEUROMUSCULAR REEDUCATION: CPT | Performed by: PHYSICAL THERAPIST

## 2025-02-25 PROCEDURE — 97110 THERAPEUTIC EXERCISES: CPT | Performed by: PHYSICAL THERAPIST

## 2025-02-25 NOTE — PROGRESS NOTES
Patient: Edy Deshpande III (74 year old, male) Referring Provider:  Insurance:   Diagnosis: Rectal prolapse (K62.3) Giancarlo Wilder  MEDICARE   Date of Surgery: n/a Next MD visit:  COMMERCIAL   Precautions:  None 2/4/2024 Referral Information:    Date of Evaluation: Req: 0, Auth: 0, Exp:     01/08/25 POC Auth Visits:          Today's Date   2/25/2025    Subjective  \"nothing new for the low back\". Patient reports that last Friday, he ate something with milk, but he took 4 lactase pills and did not have diarrhea, he was able to still have his regular BM with good consitency. Prolapse occurs with each BM.       Pain: 0/10     Objective  See flowsheet for details. Postural deviation, decreased lumbar lordosis, R pelvis mildly higher.              Assessment  Patient continues to progress well with PT. Noted improvement with posture and postural awareness. Educated on decreasing length pf time in the bathroom when having BM, patient understood.    Goals (to be met in 10 visits)   Goals       Therapy Goals      Not Met Progress Toward Partially Met Met   Patient will have increased awareness for TA/PF isolation to promote contraction and relaxation, and facilitate activation for normal bowel patterns [] [] [] []   Patient will demonstrate decreased muscles tightness, decreased tenderness, with improve soft tissues mobility to allow PF muscles relaxation for ease of defecation, and fecal passage [] [] [] []   Patient will demonstrate ability to perform PF muscles contractions in varied positions, supine, sitting and standing to facilitate PF muscles strength, improve  pelvic floor stability and decrease rectal pressure  [] [] [] []   Patient will improve PERF score to at least 3/10/10//10 for improved pelvic floor strength and pelvic organ support. [] [] [] []   Patient will have improvement of hip, abdominal and core muscles strength to 4/5 to promote lumbopelvic stability, and decrease episodes of rectal prolapse [] []  [] []   Patient will demonstrate ability to coordinate a PF contraction with exhalation for improved pressure management with functional lifting.   [] [] [] []    Patient will verbalize increase in knowledge and understanding of bladder/ bowel/ pelvic mechanics, report regular compliance of established HEP,  to manage symptoms,  and future exacerbations. [] [] [] []   Patient to verbalize knowledge on activities that can aggravate pelvic organ prolapse including chronic straining during urination or BM, or heavy lifting and strenuous activities, and ability to use breathing techniques to avoid valsalva and proper body mechanics.  [] [] [] []                  Plan  Continue PT as per established POC, NMR on last session    Treatment Last 4 Visits       2/3/2025 2/11/2025 2/18/2025 2/25/2025   PT Treatment   Treatment Day 5 6 7 9   Therapeutic Exercise Nu-step, L5 , 5 mins  PF contractions with HR x 20  Calf stretches on incline 2x 30 secs  Piriformis stretches 2x 30 secs (fig 4 no lift)  LTR x 10  TA brace with PF and bridge x 10  SL clams x 10        Nu-step, L5 , 5 mins  PF contractions with HR x 20  Calf stretches on incline 2x 30 secs  Standing hip flex, ext, add, abd x 10 red TB  Shoulder extensions and paloff press x 20, gray tube  Piriformis stretches 2x 30 secs (fig 4 no lift)  LTR x 20  TA brace with PF and bridge x 20  SL clams x 20       Nu-step, L5 , 5 mins  PF contractions with HR x 20  Calf stretches on incline 2x 30 secs  Standing hip flex, ext, add, abd x 10 red TB  Shoulder extensions, rows and paloff press x 20, gray tube  Piriformis stretches 2x 30 secs (fig 4 no lift)  LTR x 10  TA brace with PF and bridge x 10  SL clams x 10     Nu-step, L5 , 5 mins  PF contractions with HR x 20  Calf stretches on incline 2x 30 secs  Standing hip flex, ext, add, abd x 10 green TB  Shoulder extensions, rows and paloff press x 20, gray tube  Shuttle: TA brace with  squats N, 25#, x 20   Piriformis stretches 2x 30  secs (fig 4 no lift)  LTR x 10  TA brace with PF and bridge x 10  SL clams x 20           Neuro Re-Ed Pelvic Floor (PF) muscles and Transversus Abdominis (TA) muscles control, movement of abdominal muscles with diaphragmatic breathing, improve awareness for contractions and relaxations, improve coordination with respiration using digital/tactile cues, verbal cues, external palpation in supine:    TA brace x 10 with 5 secs, coordinate with exhalation  Supine: Isolated PF contractions x 10 reps with 5 secs hold, 10 secs relaxations, Isolated rapid contractions x 10  Coordinated TA and PF x 10   Pelvic Floor (PF) muscles and Transversus Abdominis (TA) muscles control, improve awareness for contractions and relaxations, improve coordination with respiration using digital/tactile cues, verbal cues, internal palpation in SL position     Isolated PF contractions  TA brace  x 10 with 5 secs Pelvic Floor (PF) muscles and Transversus Abdominis (TA) muscles control, improve awareness for contractions and relaxations, improve coordination with respiration using digital/tactile cues, verbal cues, internal palpation in SL position   Isolated PF contractions  Diaphragmatic breathing x 3 mins       Pelvic Floor (PF) muscles and Transversus Abdominis (TA) muscles control, improve awareness for contractions and relaxations, improve coordination with respiration using digital/tactile cues, verbal cues, external palpation  Isolated PF contractions  Diaphragmatic breathing x 3 mins   Therapeutic Exercise Min 25 30 30 30   Neuro Re-Ed Min 13 10 15 10   Total of Timed Procedures 38 40 45 40   Total of Service Based 0 0 0 0   Total Treatment Time 38 40 45 40         HEP  Access Code: [email or web address]  URL: [email or web address]  Date: 01/21/2025  Prepared by: Radha Justice    Exercises  - Gastroc Stretch on Wall  - 1 x daily - 7 x weekly - 1 sets - 3 reps - 30 hold  - Heel Raises, with TA brace, PF contractions and breathing out   - 1 x daily - 7 x weekly - 1-2 sets - 10 reps  - Supine Figure 4 Piriformis Stretch  - 1 x daily - 7 x weekly - 1 sets - 3 reps - 30 hold  - Supine Lower Trunk Rotation  - 1 x daily - 7 x weekly - 1-2 sets - 10 reps  - Supine Transversus Abdominis Bracing - Hands on Stomach  - 1 x daily - 7 x weekly - 1 sets - 10 reps - 5 hold  - Supine Pelvic floor contractions and relaxations  - 1 x daily - 7 x weekly - 1 sets - 10 reps - 4 hold - 10 secs relaxation  - Quick Flick Pelvic Floor Contractions in Hooklying  - 1 x daily - 7 x weekly - 1 sets - 10 reps  - Supine Diaphragmatic Breathing  - 1 x daily - 7 x weekly - 1 sets - 10 reps - 4 hold - 4 secs relaxation - 4 secs rest  - Seated Diaphragmatic Breathing  - 2 x daily - 7 x weekly - 1 sets - 10 reps - 4 hold - 4 secs release  - 4 secs rest  - Seated Pelvic Floor Contraction  - 2 x daily - 7 x weekly - 1 sets - 10 reps - 5 hold    Charges     Thera -ex - 2, NMR -1

## 2025-03-02 NOTE — H&P
HPI:     Edy Deshpande III is a 74 year old male with a PMH of allergies, GERD, Crohn's, skin ca.  He presents as a consult with:  1. OAB  2. Urethral pain  3. Recurrent UTIs  - nothing since ~ 2020    PCP - Gisela Banks Urologist - Sanchez 5/10/23    Getting rectal prolapse surgery with Dr Wilder in a couple days. Had GI surgery in 1977 and had germain placed. Thinks he had some type of damage done during the surgery to his urethra because he gets extreme pain when he gets soap in his urethra.  He doesn't like taking meds. Sometimes has difficulty urinating after the prolapse.  He is undergoing PT for rectal prolapse.  Has mild LBP and has been worse in the past. Seeing chiropractor.  Hip pain: none  Diarrhea/constipation: intermittent constipation    Reports several y h/o LUTS which is slightly worsening.  Prior BPH/OAB meds: none    AUA SS is 13/35 with 5 u; 4 f; 3 n; 1 i. Unhappy with LUTS.  Incontinence: yes, UI/dribbles a couple times per day and has had a few episodes of complete incontinence  Penoscrotal: no abnormalities  LEANDRA: ~ 40 g prostate, no nodules or tenderness    UA was negative and PVR is 6 mL    Gross hematuria: none  Tobacco hx: none  Kidney stone hx: none  Fam h/o  malignancy: none    Poor potency    PSA 0.90 5/1/23. We discussed the risks and benefits to PSA screening and he would prefer to check.    KUB 11/21/24: no stones  MRA 9/4/24: two tiny hemorrhagic cysts in L kidney and one in R kidney    Drinks ~ 60 oz water, 12 oz coffee with light yellow urine. I encouraged the pt drink at least 40-60 oz water per day or enough to keep urine clear to pale yellow to help with LUTS.    Discussed trial of flomax to see if this helps with LUTS and reviewed SEs. He will consider this.    Discussed option to proceed with cystoscopy to evaluate for urethral stricture and he will consider this.    Discussed he may have a component of pelvic floor dysfunction and discussed PFT as an option. Discussed  this may also help with urinary incontinence.    He will consider flomax for BPH/LUTS, PFT referral placed for urge incontinence and urethral pain. Recommend cysto for further eval and he will consider this. PSA order placed.    HISTORY:  Past Medical History:    Abdominal hernia    not sure    Abdominal pain    had a recent occurrence    Allergic rhinitis    Anemia    not severe, but continues    Back pain    lower back- felt a pop in back    Back problem    LOWER BACK PAIN    Belching    minor    Cancer (HCC)    skin face- MOHS    Constipation    Crohn disease (HCC)    Diarrhea, unspecified    Esophageal reflux    Fatigue    occasional-late afternoon    Flatulence/gas pain/belching    Food intolerance    lactose intolerant    Frequent urination    Heartburn    Hemorrhoids    very mild    High cholesterol    History of COVID-19    2 day fever, fatigue, not hospitalized    Irregular bowel habits    Leaking of urine    minor amount    Mouth sores    rare    Nausea    had a recent occurrence    Night sweats    Osteoporosis    osteoperosis    Problems with swallowing    no longer a problem    Skin cancer    Mohs Surgery-R-Plain    Stool incontinence    minor and infrequent    Visual impairment    glasses    Vomiting    had a recent occurrence    Wears glasses      Past Surgical History:   Procedure Laterality Date    Bowel resection  1977    Colectomy  1977    Colonoscopy  2012    Colonoscopy N/A 04/15/2022    Procedure: COLONOSCOPY;  Surgeon: Juan Antonio Tapia MD;  Location:  ENDOSCOPY    Hemorrhoidectomy  1977 with bowel resection    Needle biopsy liver  2024    Other      Abdominal surgery 1977    Other surgical history  Crohn's Disease 1977    Sigmoidoscopy,diagnostic  20 years ago?    Tonsillectomy  1955      Family History   Problem Relation Age of Onset    Colon Cancer Maternal Uncle         [place]    Crohn's Disease Sister         Bettina    Diabetes Brother         Sell    Hypertension Brother         Sell     Heart Attack Mother         Sell    Stroke Father         Sell      Social History:   Social History     Socioeconomic History    Marital status:    Tobacco Use    Smoking status: Never    Smokeless tobacco: Never   Vaping Use    Vaping status: Never Used   Substance and Sexual Activity    Alcohol use: Yes     Alcohol/week: 1.0 standard drink of alcohol     Types: 1 Glasses of wine per week     Comment: occasionally    Drug use: Never   Other Topics Concern    Caffeine Concern No    Exercise Yes    Seat Belt Yes    Special Diet Yes     Comment: no lactose    Stress Concern No    Weight Concern No        Medications (Active prior to today's visit):  Current Outpatient Medications   Medication Sig Dispense Refill    diazePAM (VALIUM) 10 MG Oral Tab Take 1 tablet (10 mg total) by mouth See Admin Instructions. Take 1-2 tablets by mouth 20-30 minutes before procedure. 2 tablet 0    tamsulosin 0.4 MG Oral Cap Take 1 capsule (0.4 mg total) by mouth every evening. 90 capsule 6    ergocalciferol 1.25 MG (92136 UT) Oral Cap Take 1 capsule (50,000 Units total) by mouth twice a week. (Patient taking differently: Take 2 capsules (100,000 Units total) by mouth once a week. Twice a week.) 24 capsule 0    Turmeric 500 MG Oral Cap Take by mouth daily.      Vitamin C 500 MG Oral Tab Take 1 tablet (500 mg total) by mouth daily.      Omega-3 Fatty Acids (FISH OIL) 500 MG Oral Cap Take by mouth.         Allergies:  Allergies[1]      ROS:     A comprehensive 10 point review of systems was completed.  Pertinent positives and negatives noted in the the HPI.    PHYSICAL EXAM:     GENERAL APPEARANCE: well, developed, well nourished, in no acute distress  NEUROLOGIC: nonfocal, alert and oriented  HEAD: normocephalic, atraumatic  EYES: sclera non-icteric  EARS: hearing intact  ORAL CAVITY: mucosa moist  NECK/THYROID: no obvious goiter or masses  LUNGS: nonlabored breathing  ABDOMEN: soft, no obvious masses or tenderness  SKIN: no  obvious rashes    : as noted above    ASSESSMENT/PLAN:   Diagnoses and all orders for this visit:    BPH with obstruction/lower urinary tract symptoms  -     Discontinue: tamsulosin 0.4 MG Oral Cap; Take 1 capsule (0.4 mg total) by mouth every evening.  -     diazePAM (VALIUM) 10 MG Oral Tab; Take 1 tablet (10 mg total) by mouth See Admin Instructions. Take 1-2 tablets by mouth 20-30 minutes before procedure.  -     tamsulosin 0.4 MG Oral Cap; Take 1 capsule (0.4 mg total) by mouth every evening.    Elevated PSA  -     PSA Total, Diagnostic; Future    Urge incontinence  -     PELVIC FLOOR THERAPY - INTERNAL    - as noted above.    Thanks again for this consult.    Ez Dwyer MD, FACS  Urologist  [facility]  Office: [phone]              [1]   Allergies  Allergen Reactions    Latex ITCHING

## 2025-03-04 ENCOUNTER — APPOINTMENT (OUTPATIENT)
Dept: PHYSICAL THERAPY | Age: 75
End: 2025-03-04
Attending: STUDENT IN AN ORGANIZED HEALTH CARE EDUCATION/TRAINING PROGRAM
Payer: MEDICARE

## 2025-03-04 ENCOUNTER — TELEPHONE (OUTPATIENT)
Facility: LOCATION | Age: 75
End: 2025-03-04

## 2025-03-04 ENCOUNTER — TELEPHONE (OUTPATIENT)
Dept: PHYSICAL THERAPY | Facility: HOSPITAL | Age: 75
End: 2025-03-04

## 2025-03-04 NOTE — TELEPHONE ENCOUNTER
Spoke with patient. Patient advised that we will go over prep at his office visit if a prep is required. Patient advised if he has pain at anytime now leading up to surgery he may take Tylenol. Patient advised to stop all supplements and weight loss medication at this time.  Patient verbalized understanding.

## 2025-03-04 NOTE — TELEPHONE ENCOUNTER
Patient would like to know if there are any instructions that he needs to follow before his procedure.    Please advise   CB#[phone]

## 2025-03-05 ENCOUNTER — OFFICE VISIT (OUTPATIENT)
Dept: PHYSICAL THERAPY | Age: 75
End: 2025-03-05
Attending: STUDENT IN AN ORGANIZED HEALTH CARE EDUCATION/TRAINING PROGRAM
Payer: MEDICARE

## 2025-03-05 PROCEDURE — 97110 THERAPEUTIC EXERCISES: CPT | Performed by: PHYSICAL THERAPIST

## 2025-03-05 NOTE — PROGRESS NOTES
Patient: Edy Deshpande III (74 year old, male) Referring Provider:  Insurance:   Diagnosis: Rectal prolapse (K62.3) Giancarlo Wilder  MEDICARE   Date of Surgery: n/a Next MD visit:  COMMERCIAL   Precautions:  None 2/4/2024 Referral Information:    Date of Evaluation: Req: 0, Auth: 0, Exp:     01/08/25 POC Auth Visits:          Discharge Summary  Pt has attended 9 visits in Physical Therapy.       Today's Date   3/5/2025    Subjective  Has colds today and has not  given any thoughts to his low back region. Patient reports that it can still take between 20 minutes to an hour to completely empty his bowel. He also still experiences rectal prolpase each time he has a BM, but his stool has been more  consistently well formed, and it is easier to have BM when it is well formed. He has been utilizing the breathing techniques, sometimes it helps. he is compliant with doing his home exercises. He will have his surgery for prolapse repair next week.        Pain: 0/10     Objective  Patient opted not to do internal assessment today.   1/8/2025 3/5/2025    1210 1349   Objective     Posture FHP, decreased lumbar lordosis, posterior pelvic tilt FHP, decreased lumbar lordosis, posterior pelvic tilt   Pelvic Alignment L anterior ilial rotation, L pelvis lower R pelvis higher, R posterior ilial rotation   External Palpation non tender non tender   Scars/Location/Surgeries Long horizontal scar across the lower abdomen Long horizontal scar across the lower abdomen   Abdominal Wall Integrity Poor Fair   Diastasis Recti Above Umbilicus 0 --   Diastasis Recti Umbilicus 0 --   Diastasis Recti Below Umbilicus 0 --   Special tests ASLR - poor lumbar loading transfers --   Informed consent for internal pelvic evaluation received Yes Yes: 2/11/2025   Voluntary Contraction present present   Voluntary Relaxation present present   Involuntary Contraction absent absent   Involuntary Relaxation present absent   Internal Exam/Scar n/a --   Power  0/5 3   Endurance -- 5   REPS -- 5   Fast -- 10   External Anal Sphincter noted presence of hemorhoids around the anal web,3/5, severe tightness 3/5   Accessory Muscle Use gluteals; abdominals abdominals   Eccentric lengthening contraction Poor --   Superficial Transverse Perineal -- moderate restriction   Deep Transverse Perineal -- moderate restriction   Pubococcygeus --Pubococcygeus. no value.. The comment is Unable to fully insert for assessment due to severe resistance. Taken on 1/8/25 1210(Unable to fully insert for assessment due to severe resistance) moderate restriction   Iliococcygeus --Iliococcygeus. no value.. The comment is Unable to fully insert for assessment due to severe resistance. Taken on 1/8/25 1210(Unable to fully insert for assessment due to severe resistance) minimal restriction   Coccygeus --Coccygeus. no value.. The comment is Unable to fully insert for assessment due to severe resistance. Taken on 1/8/25 1210(Unable to fully insert for assessment due to severe resistance) minimal restriction   Sensory Reflex Anal [place] normal bilaterally --   Pt ambulates on level ground with normal mechanics normal mechanics   OTHER     Diastasis Recti -- negative     Ext Obs & Int Exam     1/8/2025 2/11/2025 1210        External Observation and Internal Examination     Voluntary Contraction present present   Voluntary Relaxation present present   Involuntary Contraction absent absent   Involuntary Relaxation present absent   Anus/External Anal Sphincter -- hemorrhoids   Internal Exam/Scar n/a --   Power 0/5 3   Endurance -- 5   REPS -- 5   Fast -- 10   External Anal Sphincter noted presence of hemorhoids around the anal web,3/5, severe tightness 3/5   Accessory Muscle Use gluteals; abdominals abdominals   Rt Adductor Flexibility -- not tested   Lt Adductor Flexibility -- not tested   Eccentric lengthening contraction Poor --     Internal Palpation     1/8/2025 2/11/2025    1210        Internal  Palpation     Superficial Transverse Perineal -- moderate restriction   Deep Transverse Perineal -- moderate restriction   Pubococcygeus --Pubococcygeus. no value.. The comment is Unable to fully insert for assessment due to severe resistance. Taken on 1/8/25 1210(Unable to fully insert for assessment due to severe resistance) moderate restriction   Iliococcygeus --Iliococcygeus. no value.. The comment is Unable to fully insert for assessment due to severe resistance. Taken on 1/8/25 1210(Unable to fully insert for assessment due to severe resistance) minimal restriction   Coccygeus --Coccygeus. no value.. The comment is Unable to fully insert for assessment due to severe resistance. Taken on 1/8/25 1210(Unable to fully insert for assessment due to severe resistance) minimal restriction   Sensory Reflex Anal [place] normal bilaterally --   Pt ambulates on level ground with normal mechanics normal mechanics           Assessment  Patient progressed well with PT treatments, noted improved awareness for pelvic floor muscles contractions and relaxations, also noted improved ability to activate PF muscles and improvement with strength. Noted improvement of soft tissues mobility.  Patient was able to tolerate full digital insertion during 2/11/25 PT visit. Patient opted not to do internal asessment today, mesaurements recorded for Pelvic floor muscles were from visit done on 2/11/2025. Patient was able to demonstrate good performance of HEP, including diaphragmatic breathing and pelvic floor contractions and relaxations. . Discussed with patient importance of regular compliance with HEP, patient understood. Patient is now discharged for PT on HEP.    Goals (to be met in 10 visits)   Therapy Goals          Not Met Progress Toward Partially Met Met   Patient will have increased awareness for TA/PF isolation to promote contraction and relaxation, and facilitate activation for normal bowel patterns []  []  [x]  []    Patient will  demonstrate decreased muscles tightness, decreased tenderness, with improve soft tissues mobility to allow PF muscles relaxation for ease of defecation, and fecal passage []  []  [x]  []    Patient will demonstrate ability to perform PF muscles contractions in varied positions, supine, sitting and standing to facilitate PF muscles strength, improve  pelvic floor stability and decrease rectal pressure  []  []  [x]  []    Patient will improve PERF score to at least 3/10/10//10 for improved pelvic floor strength and pelvic organ support. []  []  [x]  []    Patient will have improvement of hip, abdominal and core muscles strength to 4/5 to promote lumbopelvic stability, and decrease episodes of rectal prolapse []  []  [x]  []    Patient will demonstrate ability to coordinate a PF contraction with exhalation for improved pressure management with functional lifting.   []  []  []  [x]     Patient will verbalize increase in knowledge and understanding of bladder/ bowel/ pelvic mechanics, report regular compliance of established HEP,  to manage symptoms,  and future exacerbations. []  []  []  [x]    Patient to verbalize knowledge on activities that can aggravate pelvic organ prolapse including chronic straining during urination or BM, or heavy lifting and strenuous activities, and ability to use breathing techniques to avoid valsalva and proper body mechanics.  []  []  []  [x]                Plan  Patient is now discharged from PT on HEP.    Treatment Last 4 Visits       2/11/2025 2/18/2025 2/25/2025 3/5/2025   PT Treatment   Treatment Day 6 7 8 9   Therapeutic Exercise Nu-step, L5 , 5 mins  PF contractions with HR x 20  Calf stretches on incline 2x 30 secs  Standing hip flex, ext, add, abd x 10 red TB  Shoulder extensions and paloff press x 20, gray tube  Piriformis stretches 2x 30 secs (fig 4 no lift)  LTR x 20  TA brace with PF and bridge x 20  SL clams x 20       Nu-step, L5 , 5 mins  PF contractions with HR x 20  Calf  stretches on incline 2x 30 secs  Standing hip flex, ext, add, abd x 10 red TB  Shoulder extensions, rows and paloff press x 20, gray tube  Piriformis stretches 2x 30 secs (fig 4 no lift)  LTR x 10  TA brace with PF and bridge x 10  SL clams x 10     Nu-step, L5 , 5 mins  PF contractions with HR x 20  Calf stretches on incline 2x 30 secs  Standing hip flex, ext, add, abd x 10 green TB  Shoulder extensions, rows and paloff press x 20, gray tube  Shuttle: TA brace with  squats N, 25#, x 20   Piriformis stretches 2x 30 secs (fig 4 no lift)  LTR x 10  TA brace with PF and bridge x 10  SL clams x 20            Neuro Re-Ed Pelvic Floor (PF) muscles and Transversus Abdominis (TA) muscles control, improve awareness for contractions and relaxations, improve coordination with respiration using digital/tactile cues, verbal cues, internal palpation in SL position     Isolated PF contractions  TA brace  x 10 with 5 secs Pelvic Floor (PF) muscles and Transversus Abdominis (TA) muscles control, improve awareness for contractions and relaxations, improve coordination with respiration using digital/tactile cues, verbal cues, internal palpation in SL position   Isolated PF contractions  Diaphragmatic breathing x 3 mins       Pelvic Floor (PF) muscles and Transversus Abdominis (TA) muscles control, improve awareness for contractions and relaxations, improve coordination with respiration using digital/tactile cues, verbal cues, external palpation  Isolated PF contractions  Diaphragmatic breathing x 3 mins    Therapeutic Exercise Min 30 30 30    Neuro Re-Ed Min 10 15 10    Total of Timed Procedures 40 45 40    Total of Service Based 0 0 0    Total Treatment Time 40 45 40           2/11/2025 2/18/2025 2/25/2025 3/5/2025   Pelvic Treatment   Treatment Day 6 7 8 9   Therapeutic Exercise    Nu-step, L5 , 5 mins   PF contractions with HR x 20   Calf stretches on incline 2x 30 secs   Standing hip flex, ext, add, abd x 10 green TB   Shoulder  extensions, rows and paloff press x 20, gray tube   Piriformis stretches 2x 30 secs (fig 4 no lift)   LTR x 10   TA brace with PF and bridge x 10   SL clams x 20        Neuro Re-Education    --   Therapeutic Exercise Minutes    38   Total Time Of Timed Procedures    38   Total Time Of Service-Based Procedures    0   Total Treatment Time    38               HEP  Access Code: [email or web address]  URL: [email or web address]  Date: 03/05/2025  Prepared by: Radha Justice    Exercises  - Gastroc Stretch on Wall  - 1 x daily - 7 x weekly - 1 sets - 3 reps - 30 hold  - Heel Raises, with TA brace, PF contractions and breathing out  - 1 x daily - 7 x weekly - 1-2 sets - 10 reps  - Supine Figure 4 Piriformis Stretch  - 1 x daily - 7 x weekly - 1 sets - 3 reps - 30 hold  - Supine Lower Trunk Rotation  - 1 x daily - 7 x weekly - 1-2 sets - 10 reps  - Supine Transversus Abdominis Bracing - Hands on Stomach  - 1 x daily - 7 x weekly - 1 sets - 10 reps - 5 hold  - Supine Pelvic floor contractions and relaxations  - 1 x daily - 7 x weekly - 1 sets - 10 reps - 4 hold - 10 secs relaxation  - Quick Flick Pelvic Floor Contractions in Hooklying  - 1 x daily - 7 x weekly - 1 sets - 10 reps  - Supine Diaphragmatic Breathing  - 1 x daily - 7 x weekly - 1 sets - 10 reps - 4 hold - 4 secs relaxation - 4 secs rest  - Seated Diaphragmatic Breathing  - 2 x daily - 7 x weekly - 1 sets - 10 reps - 4 hold - 4 secs release  - 4 secs rest  - Seated Pelvic Floor Contraction  - 2 x daily - 7 x weekly - 1 sets - 10 reps - 5 hold  - Supine Bridge with Pelvic Floor Contraction  - 1 x daily - 7 x weekly - 1-2 sets - 10 reps  - Clamshell  - 1 x daily - 7 x weekly - 1-2 sets - 10 reps  - Hip Abduction with Resistance Loop  - 2 x daily - 7 x weekly - 1-2 sets - 10 reps  - Hip Extension with Resistance Loop  - 2 x daily - 7 x weekly - 1-2 sets - 10 reps  - Standing Hip Flexion with Resistance Loop  - 2 x daily - 7 x weekly - 1-2 sets - 10 reps  -  Standing Hip Adduction with Anchored Resistance  - 2 x daily - 7 x weekly - 1-2 sets - 10 reps  - Standing Shoulder Flexion to 90 Degrees with Dumbbells  - 2 x daily - 7 x weekly - 1-2 sets - 10 reps    Charges  Thera ex - 3       Patient/Family/Caregiver was advised of these findings, precautions, and treatment options and has agreed to actively participate in planning and for this course of care.    Thank you for your referral. If you have any questions, please contact me at Dept: [phone].    Sincerely,  Electronically signed by therapist: Radha Justice PT     Physician's certification required:  No  Please co-sign or sign and return this letter via fax as soon as possible to [phone].   I certify the need for these services furnished under this plan of treatment and while under my care.    X___________________________________________________ Date____________________    Certification From: 3/5/2025  To:6/3/2025

## 2025-03-10 ENCOUNTER — OFFICE VISIT (OUTPATIENT)
Facility: LOCATION | Age: 75
End: 2025-03-10
Payer: MEDICARE

## 2025-03-10 ENCOUNTER — OFFICE VISIT (OUTPATIENT)
Dept: SURGERY | Facility: CLINIC | Age: 75
End: 2025-03-10
Payer: MEDICARE

## 2025-03-10 VITALS
SYSTOLIC BLOOD PRESSURE: 97 MMHG | OXYGEN SATURATION: 100 % | HEART RATE: 64 BPM | DIASTOLIC BLOOD PRESSURE: 60 MMHG | TEMPERATURE: 97 F

## 2025-03-10 DIAGNOSIS — N13.8 BPH WITH OBSTRUCTION/LOWER URINARY TRACT SYMPTOMS: Primary | ICD-10-CM

## 2025-03-10 DIAGNOSIS — N39.41 URGE INCONTINENCE: ICD-10-CM

## 2025-03-10 DIAGNOSIS — K62.3 RECTAL PROLAPSE: Primary | ICD-10-CM

## 2025-03-10 DIAGNOSIS — R97.20 ELEVATED PSA: ICD-10-CM

## 2025-03-10 DIAGNOSIS — N40.1 BPH WITH OBSTRUCTION/LOWER URINARY TRACT SYMPTOMS: Primary | ICD-10-CM

## 2025-03-10 PROCEDURE — 51798 US URINE CAPACITY MEASURE: CPT | Performed by: UROLOGY

## 2025-03-10 PROCEDURE — 99204 OFFICE O/P NEW MOD 45 MIN: CPT | Performed by: UROLOGY

## 2025-03-10 RX ORDER — NEOMYCIN SULFATE 500 MG/1
TABLET ORAL
Qty: 6 TABLET | Refills: 0 | Status: SHIPPED | OUTPATIENT
Start: 2025-03-10

## 2025-03-10 RX ORDER — METRONIDAZOLE 500 MG/1
TABLET ORAL
Qty: 3 TABLET | Refills: 0 | Status: SHIPPED | OUTPATIENT
Start: 2025-03-10

## 2025-03-10 RX ORDER — DIAZEPAM 10 MG/1
10 TABLET ORAL SEE ADMIN INSTRUCTIONS
Qty: 2 TABLET | Refills: 0 | Status: SHIPPED | OUTPATIENT
Start: 2025-03-10

## 2025-03-10 RX ORDER — POLYETHYLENE GLYCOL 3350, SODIUM CHLORIDE, SODIUM BICARBONATE, POTASSIUM CHLORIDE 420; 11.2; 5.72; 1.48 G/4L; G/4L; G/4L; G/4L
POWDER, FOR SOLUTION ORAL
Qty: 1 EACH | Refills: 0 | Status: SHIPPED | OUTPATIENT
Start: 2025-03-10

## 2025-03-10 RX ORDER — TAMSULOSIN HYDROCHLORIDE 0.4 MG/1
0.4 CAPSULE ORAL EVERY EVENING
Qty: 90 CAPSULE | Refills: 6 | Status: SHIPPED | OUTPATIENT
Start: 2025-03-10 | End: 2025-03-10

## 2025-03-10 RX ORDER — TAMSULOSIN HYDROCHLORIDE 0.4 MG/1
0.4 CAPSULE ORAL EVERY EVENING
Qty: 90 CAPSULE | Refills: 6 | Status: SHIPPED | OUTPATIENT
Start: 2025-03-10

## 2025-03-10 NOTE — H&P (VIEW-ONLY)
Follow Up Visit Note       Active Problems      1. Rectal prolapse            Chief Complaint   Chief Complaint   Patient presents with    Follow - Up     EP - 1 month follow up- rectal prolapse. pt has robotic suture rectopexy scheduled on 3/12/25- no new symptoms.        History of Present Illness  This is a very nice 74-year-old gentleman referred to me from Dr. Tapia of [facility] with concern for rectal prolapse.  Patient does have a history of ileocolonic Crohn's disease and is currently not on any active treatment for this. He has been in endoscopic and histologic remission and underwent a flexible sigmoidoscopy with Dr. Tapia in July. He refused to have a full colonoscopy.  His most recent full colonoscopy performed on 4/15/2022. He underwent an ileocecectomy in 1977 at [facility].     Patient has noted intermittent anorectal symptoms which she is attributed to hemorrhoids over the last 20+ years.  The symptoms have been more frequent over the last 6-8 months.  He is currently experiencing prolapse of tissue with almost every bowel movement.  After having a bowel movement, patient states he has to sit on a hard surface and the tissue will go back into his rectum.  He denies any rectal pain and has very rare rectal bleeding.  He is currently taking a twice daily fiber supplement.  He denies any straining lately.  He does feel the sensation of incomplete emptying around 50% of the time.  He denies any itching, drainage or incontinence.  No prior anorectal surgery.    I met the patient on 9/26/2024.  At that time, patient was unable to demonstrate any evidence of full-thickness rectal prolapse.  He did have large, prolapsing internal hemorrhoids.  I have treated a right anterior and left lateral internal hemorrhoid with rubber band ligation and sclerotherapy on 10/1/2024 and 11/7/2024.     Unfortunately, patient continued to have prolapse with every bowel movement.  I last saw the  patient on 12/5/2024.  Patient was able to demonstrate a 1 inch segment of full-thickness rectal prolapse on examination on the commode after Valsalva.    Patient is currently seeing Radha Justice of pelvic floor physical therapy.  He is still seeing prolapse with almost every bowel movement. Patient did have anorectal manometry on 1/15/2025 with findings as described below    Weakened internal anal sphincter with low resting tone  Paradoxical increase in anal sphincter presssure with adequate rectal push pressures are consistent with Type I dyssynergic defecation, with positive balloon expulsion test  Mild rectal hypersensitivity, consider proctitis, IBS  Presence of rectoanal inhibitory reflex excludes Hirschsprung's disease  Recommend pelvic floor biofeedback    Patient is currently scheduled for robotic suture rectopexy, possible open in 2 days on 3/12/2025.  Patient did meet with Dr. Paul Ruby at [facility] for a second opinion.  Dr. Ruby counseled patient that robotic suture rectopexy is a reasonable treatment option for the patient's ongoing, life-limiting rectal prolapse.      Allergies  Edy is allergic to latex.    Past Medical / Surgical / Social / Family History    The past medical and past surgical history have been reviewed by me today.    Past Medical History:    Abdominal hernia    not sure    Abdominal pain    had a recent occurrence    Allergic rhinitis    Anemia    not severe, but continues    Back pain    lower back- felt a pop in back    Back problem    LOWER BACK PAIN    Belching    minor    Cancer (HCC)    skin face- MOHS    Constipation    Crohn disease (HCC)    Diarrhea, unspecified    Esophageal reflux    Fatigue    occasional-late afternoon    Flatulence/gas pain/belching    Food intolerance    lactose intolerant    Frequent urination    Heartburn    Hemorrhoids    very mild    High cholesterol    History of COVID-19    2 day fever, fatigue, not hospitalized    Irregular  bowel habits    Leaking of urine    minor amount    Mouth sores    rare    Nausea    had a recent occurrence    Night sweats    Osteoporosis    osteoperosis    Problems with swallowing    no longer a problem    Skin cancer    Mohs Surgery-[facility]    Stool incontinence    minor and infrequent    Visual impairment    glasses    Vomiting    had a recent occurrence    Wears glasses     Past Surgical History:   Procedure Laterality Date    Bowel resection  1977    Colectomy  1977    Colonoscopy  2012    Colonoscopy N/A 04/15/2022    Procedure: COLONOSCOPY;  Surgeon: Juan Antonio Tapia MD;  Location:  ENDOSCOPY    Hemorrhoidectomy  1977 with bowel resection    Needle biopsy liver  2024    Other      Abdominal surgery 1977    Other surgical history  Crohn's Disease 1977    Sigmoidoscopy,diagnostic  20 years ago?    Tonsillectomy  1955       The family history and social history have been reviewed by me today.    Family History   Problem Relation Age of Onset    Colon Cancer Maternal Uncle         AMANDA'Joselito    Crohn's Disease Sister         Bettina    Diabetes Brother         Sell    Hypertension Brother         Sell    Heart Attack Mother         Sell    Stroke Father         Sell     Social History     Socioeconomic History    Marital status:    Tobacco Use    Smoking status: Never    Smokeless tobacco: Never   Vaping Use    Vaping status: Never Used   Substance and Sexual Activity    Alcohol use: Yes     Alcohol/week: 1.0 standard drink of alcohol     Types: 1 Glasses of wine per week     Comment: occasionally    Drug use: Never   Other Topics Concern    Caffeine Concern No    Exercise Yes    Seat Belt Yes    Special Diet Yes     Comment: no lactose    Stress Concern No    Weight Concern No        Current Outpatient Medications:     PEG 3350-KCl-Na Bicarb-NaCl 420 g Oral Recon Soln, Starting at 4:00 pm the night before procedure, drink 8 ounces of the prep every 15-20 minutes until finished., Disp: 1 each, Rfl: 0     neomycin 500 MG Oral Tab, Take 2 tablets by mouth at 3pm, 4pm and 10pm, Disp: 6 tablet, Rfl: 0    metroNIDAZOLE (FLAGYL) 500 MG Oral Tab, Take 1 tablet by mouth at 3pm, 4pm and 10pm, Disp: 3 tablet, Rfl: 0    diazePAM (VALIUM) 10 MG Oral Tab, Take 1 tablet (10 mg total) by mouth See Admin Instructions. Take 1-2 tablets by mouth 20-30 minutes before procedure. (Patient not taking: Reported on 3/10/2025), Disp: 2 tablet, Rfl: 0    tamsulosin 0.4 MG Oral Cap, Take 1 capsule (0.4 mg total) by mouth every evening. (Patient not taking: Reported on 3/10/2025), Disp: 90 capsule, Rfl: 6    ergocalciferol 1.25 MG (69651 UT) Oral Cap, Take 1 capsule (50,000 Units total) by mouth twice a week. (Patient not taking: Reported on 3/10/2025), Disp: 24 capsule, Rfl: 0    Turmeric 500 MG Oral Cap, Take by mouth daily. (Patient not taking: Reported on 3/10/2025), Disp: , Rfl:     Vitamin C 500 MG Oral Tab, Take 1 tablet (500 mg total) by mouth daily. (Patient not taking: Reported on 3/10/2025), Disp: , Rfl:     Omega-3 Fatty Acids (FISH OIL) 500 MG Oral Cap, Take by mouth. (Patient not taking: Reported on 3/10/2025), Disp: , Rfl:      Review of Systems  A 10 point review of systems was performed and negative unless otherwise documented per HPI.     Physical Findings   BP 97/60 (BP Location: Left arm, Patient Position: Sitting, Cuff Size: adult)   Pulse 64   Temp 97.2 °F (36.2 °C) (Temporal)   SpO2 100%   Physical Exam  Vitals and nursing note reviewed. Exam conducted with a chaperone present.   Constitutional:       General: He is not in acute distress.  HENT:      Head: Normocephalic and atraumatic.      Mouth/Throat:      Mouth: Mucous membranes are moist.   Cardiovascular:      Rate and Rhythm: Normal rate and regular rhythm.   Pulmonary:      Effort: Pulmonary effort is normal.   Abdominal:      General: There is no distension.      Palpations: Abdomen is soft.      Tenderness: There is no abdominal tenderness.    Genitourinary:     Comments: Repeat anorectal examination deferred today  Musculoskeletal:         General: No deformity.   Skin:     General: Skin is warm and dry.   Neurological:      General: No focal deficit present.      Mental Status: He is alert.   Psychiatric:         Mood and Affect: Mood normal.          Assessment   1. Rectal prolapse        This is a very nice 74-year-old gentleman referred to me from Dr. Tapia of [facility] with concern for rectal prolapse.  Patient does have a history of ileocolonic Crohn's disease and is currently not on any active treatment for this. He has been in endoscopic and histologic remission and underwent a flexible sigmoidoscopy with Dr. Tapia in July. He refused to have a full colonoscopy.  His most recent full colonoscopy performed on 4/15/2022. He underwent an ileocecectomy in 1977 at [facility].     Patient has noted intermittent anorectal symptoms which she is attributed to hemorrhoids over the last 20+ years.  The symptoms have been more frequent over the last 6-8 months.  He is currently experiencing prolapse of tissue with almost every bowel movement.  After having a bowel movement, patient states he has to sit on a hard surface and the tissue will go back into his rectum.  He denies any rectal pain and has very rare rectal bleeding.  He is currently taking a twice daily fiber supplement.  He denies any straining lately.  He does feel the sensation of incomplete emptying around 50% of the time.  He denies any itching, drainage or incontinence.  No prior anorectal surgery.    I met the patient on 9/26/2024.  At that time, patient was unable to demonstrate any evidence of full-thickness rectal prolapse.  He did have large, prolapsing internal hemorrhoids.  I have treated a right anterior and left lateral internal hemorrhoid with rubber band ligation and sclerotherapy on 10/1/2024 and 11/7/2024.     Unfortunately, patient continued to have  prolapse with every bowel movement.  I last saw the patient on 12/5/2024.  Patient was able to demonstrate a 1 inch segment of full-thickness rectal prolapse on examination on the commode after Valsalva.    Patient is currently seeing Radha Justice of pelvic floor physical therapy.  He is still seeing prolapse with almost every bowel movement. Patient did have anorectal manometry on 1/15/2025 with findings as described below    Weakened internal anal sphincter with low resting tone  Paradoxical increase in anal sphincter presssure with adequate rectal push pressures are consistent with Type I dyssynergic defecation, with positive balloon expulsion test  Mild rectal hypersensitivity, consider proctitis, IBS  Presence of rectoanal inhibitory reflex excludes Hirschsprung's disease  Recommend pelvic floor biofeedback    Patient is currently scheduled for robotic suture rectopexy, possible open in 2 days on 3/12/2025.  Patient did meet with Dr. Paul Ruby at [facility] for a second opinion.  Dr. Ruby counseled patient that robotic suture rectopexy is a reasonable treatment option for the patient's ongoing, life-limiting rectal prolapse.    Plan   I had an extensive conversation with the patient explaining that rectal prolapse can be repaired through abdominal and perineal approaches.  I would favor an abdominal approach as this approach generally has less risk of recurrence.  My personal preference would be to perform a robotic suture rectopexy.  The details of this procedure were discussed including the expected recovery time, risks, benefits and alternatives. Specific risks discussed included but not limited to bleeding, infection, damage to the rectum, bowel, ureter, possible need for conversion to open procedure and possible recurrent prolapse.     Patient expressed understanding and wished to proceed with surgery as scheduled in 2 days on 3/12/2025.  I recommend completion of a bowel prep and  antibiotics per ERAS protocol day prior to surgery.  The patient will be admitted to the hospital overnight for observation after surgery.     No orders of the defined types were placed in this encounter.      Imaging & Referrals   None    Follow Up  No follow-ups on file.    Giancarlo Wilder MD

## 2025-03-10 NOTE — PROGRESS NOTES
Follow Up Visit Note       Active Problems      1. Rectal prolapse            Chief Complaint   Chief Complaint   Patient presents with    Follow - Up     EP - 1 month follow up- rectal prolapse. pt has robotic suture rectopexy scheduled on 3/12/25- no new symptoms.        History of Present Illness  This is a very nice 74-year-old gentleman referred to me from Dr. Tapia of [facility] with concern for rectal prolapse.  Patient does have a history of ileocolonic Crohn's disease and is currently not on any active treatment for this. He has been in endoscopic and histologic remission and underwent a flexible sigmoidoscopy with Dr. Tapia in July. He refused to have a full colonoscopy.  His most recent full colonoscopy performed on 4/15/2022. He underwent an ileocecectomy in 1977 at [facility].     Patient has noted intermittent anorectal symptoms which she is attributed to hemorrhoids over the last 20+ years.  The symptoms have been more frequent over the last 6-8 months.  He is currently experiencing prolapse of tissue with almost every bowel movement.  After having a bowel movement, patient states he has to sit on a hard surface and the tissue will go back into his rectum.  He denies any rectal pain and has very rare rectal bleeding.  He is currently taking a twice daily fiber supplement.  He denies any straining lately.  He does feel the sensation of incomplete emptying around 50% of the time.  He denies any itching, drainage or incontinence.  No prior anorectal surgery.    I met the patient on 9/26/2024.  At that time, patient was unable to demonstrate any evidence of full-thickness rectal prolapse.  He did have large, prolapsing internal hemorrhoids.  I have treated a right anterior and left lateral internal hemorrhoid with rubber band ligation and sclerotherapy on 10/1/2024 and 11/7/2024.     Unfortunately, patient continued to have prolapse with every bowel movement.  I last saw the  patient on 12/5/2024.  Patient was able to demonstrate a 1 inch segment of full-thickness rectal prolapse on examination on the commode after Valsalva.    Patient is currently seeing Radha Justice of pelvic floor physical therapy.  He is still seeing prolapse with almost every bowel movement. Patient did have anorectal manometry on 1/15/2025 with findings as described below    Weakened internal anal sphincter with low resting tone  Paradoxical increase in anal sphincter presssure with adequate rectal push pressures are consistent with Type I dyssynergic defecation, with positive balloon expulsion test  Mild rectal hypersensitivity, consider proctitis, IBS  Presence of rectoanal inhibitory reflex excludes Hirschsprung's disease  Recommend pelvic floor biofeedback    Patient is currently scheduled for robotic suture rectopexy, possible open in 2 days on 3/12/2025.  Patient did meet with Dr. Paul Ruby at [facility] for a second opinion.  Dr. Ruby counseled patient that robotic suture rectopexy is a reasonable treatment option for the patient's ongoing, life-limiting rectal prolapse.      Allergies  Edy is allergic to latex.    Past Medical / Surgical / Social / Family History    The past medical and past surgical history have been reviewed by me today.    Past Medical History:    Abdominal hernia    not sure    Abdominal pain    had a recent occurrence    Allergic rhinitis    Anemia    not severe, but continues    Back pain    lower back- felt a pop in back    Back problem    LOWER BACK PAIN    Belching    minor    Cancer (HCC)    skin face- MOHS    Constipation    Crohn disease (HCC)    Diarrhea, unspecified    Esophageal reflux    Fatigue    occasional-late afternoon    Flatulence/gas pain/belching    Food intolerance    lactose intolerant    Frequent urination    Heartburn    Hemorrhoids    very mild    High cholesterol    History of COVID-19    2 day fever, fatigue, not hospitalized    Irregular  bowel habits    Leaking of urine    minor amount    Mouth sores    rare    Nausea    had a recent occurrence    Night sweats    Osteoporosis    osteoperosis    Problems with swallowing    no longer a problem    Skin cancer    [facility] Surgery[facility]    Stool incontinence    minor and infrequent    Visual impairment    glasses    Vomiting    had a recent occurrence    Wears glasses     Past Surgical History:   Procedure Laterality Date    Bowel resection  1977    Colectomy  1977    Colonoscopy  2012    Colonoscopy N/A 04/15/2022    Procedure: COLONOSCOPY;  Surgeon: Juan Antonio Tapia MD;  Location:  ENDOSCOPY    Hemorrhoidectomy  1977 with bowel resection    Needle biopsy liver  2024    Other      Abdominal surgery 1977    Other surgical history  Crohn's Disease 1977    Sigmoidoscopy,diagnostic  20 years ago?    Tonsillectomy  1955       The family history and social history have been reviewed by me today.    Family History   Problem Relation Age of Onset    Colon Cancer Maternal Uncle         AMANDA'Joselito    Crohn's Disease Sister         Bettina    Diabetes Brother         Sell    Hypertension Brother         Sell    Heart Attack Mother         Sell    Stroke Father         Sell     Social History     Socioeconomic History    Marital status:    Tobacco Use    Smoking status: Never    Smokeless tobacco: Never   Vaping Use    Vaping status: Never Used   Substance and Sexual Activity    Alcohol use: Yes     Alcohol/week: 1.0 standard drink of alcohol     Types: 1 Glasses of wine per week     Comment: occasionally    Drug use: Never   Other Topics Concern    Caffeine Concern No    Exercise Yes    Seat Belt Yes    Special Diet Yes     Comment: no lactose    Stress Concern No    Weight Concern No        Current Outpatient Medications:     PEG 3350-KCl-Na Bicarb-NaCl 420 g Oral Recon Soln, Starting at 4:00 pm the night before procedure, drink 8 ounces of the prep every 15-20 minutes until finished., Disp: 1 each, Rfl: 0     neomycin 500 MG Oral Tab, Take 2 tablets by mouth at 3pm, 4pm and 10pm, Disp: 6 tablet, Rfl: 0    metroNIDAZOLE (FLAGYL) 500 MG Oral Tab, Take 1 tablet by mouth at 3pm, 4pm and 10pm, Disp: 3 tablet, Rfl: 0    diazePAM (VALIUM) 10 MG Oral Tab, Take 1 tablet (10 mg total) by mouth See Admin Instructions. Take 1-2 tablets by mouth 20-30 minutes before procedure. (Patient not taking: Reported on 3/10/2025), Disp: 2 tablet, Rfl: 0    tamsulosin 0.4 MG Oral Cap, Take 1 capsule (0.4 mg total) by mouth every evening. (Patient not taking: Reported on 3/10/2025), Disp: 90 capsule, Rfl: 6    ergocalciferol 1.25 MG (32453 UT) Oral Cap, Take 1 capsule (50,000 Units total) by mouth twice a week. (Patient not taking: Reported on 3/10/2025), Disp: 24 capsule, Rfl: 0    Turmeric 500 MG Oral Cap, Take by mouth daily. (Patient not taking: Reported on 3/10/2025), Disp: , Rfl:     Vitamin C 500 MG Oral Tab, Take 1 tablet (500 mg total) by mouth daily. (Patient not taking: Reported on 3/10/2025), Disp: , Rfl:     Omega-3 Fatty Acids (FISH OIL) 500 MG Oral Cap, Take by mouth. (Patient not taking: Reported on 3/10/2025), Disp: , Rfl:      Review of Systems  A 10 point review of systems was performed and negative unless otherwise documented per HPI.     Physical Findings   BP 97/60 (BP Location: Left arm, Patient Position: Sitting, Cuff Size: adult)   Pulse 64   Temp 97.2 °F (36.2 °C) (Temporal)   SpO2 100%   Physical Exam  Vitals and nursing note reviewed. Exam conducted with a chaperone present.   Constitutional:       General: He is not in acute distress.  HENT:      Head: Normocephalic and atraumatic.      Mouth/Throat:      Mouth: Mucous membranes are moist.   Cardiovascular:      Rate and Rhythm: Normal rate and regular rhythm.   Pulmonary:      Effort: Pulmonary effort is normal.   Abdominal:      General: There is no distension.      Palpations: Abdomen is soft.      Tenderness: There is no abdominal tenderness.    Genitourinary:     Comments: Repeat anorectal examination deferred today  Musculoskeletal:         General: No deformity.   Skin:     General: Skin is warm and dry.   Neurological:      General: No focal deficit present.      Mental Status: He is alert.   Psychiatric:         Mood and Affect: Mood normal.          Assessment   1. Rectal prolapse        This is a very nice 74-year-old gentleman referred to me from Dr. Tapia of [facility] with concern for rectal prolapse.  Patient does have a history of ileocolonic Crohn's disease and is currently not on any active treatment for this. He has been in endoscopic and histologic remission and underwent a flexible sigmoidoscopy with Dr. Tapia in July. He refused to have a full colonoscopy.  His most recent full colonoscopy performed on 4/15/2022. He underwent an ileocecectomy in 1977 at [facility].     Patient has noted intermittent anorectal symptoms which she is attributed to hemorrhoids over the last 20+ years.  The symptoms have been more frequent over the last 6-8 months.  He is currently experiencing prolapse of tissue with almost every bowel movement.  After having a bowel movement, patient states he has to sit on a hard surface and the tissue will go back into his rectum.  He denies any rectal pain and has very rare rectal bleeding.  He is currently taking a twice daily fiber supplement.  He denies any straining lately.  He does feel the sensation of incomplete emptying around 50% of the time.  He denies any itching, drainage or incontinence.  No prior anorectal surgery.    I met the patient on 9/26/2024.  At that time, patient was unable to demonstrate any evidence of full-thickness rectal prolapse.  He did have large, prolapsing internal hemorrhoids.  I have treated a right anterior and left lateral internal hemorrhoid with rubber band ligation and sclerotherapy on 10/1/2024 and 11/7/2024.     Unfortunately, patient continued to have  prolapse with every bowel movement.  I last saw the patient on 12/5/2024.  Patient was able to demonstrate a 1 inch segment of full-thickness rectal prolapse on examination on the commode after Valsalva.    Patient is currently seeing Radha Justice of pelvic floor physical therapy.  He is still seeing prolapse with almost every bowel movement. Patient did have anorectal manometry on 1/15/2025 with findings as described below    Weakened internal anal sphincter with low resting tone  Paradoxical increase in anal sphincter presssure with adequate rectal push pressures are consistent with Type I dyssynergic defecation, with positive balloon expulsion test  Mild rectal hypersensitivity, consider proctitis, IBS  Presence of rectoanal inhibitory reflex excludes Hirschsprung's disease  Recommend pelvic floor biofeedback    Patient is currently scheduled for robotic suture rectopexy, possible open in 2 days on 3/12/2025.  Patient did meet with Dr. Paul Ruby at [facility] for a second opinion.  Dr. Ruby counseled patient that robotic suture rectopexy is a reasonable treatment option for the patient's ongoing, life-limiting rectal prolapse.    Plan   I had an extensive conversation with the patient explaining that rectal prolapse can be repaired through abdominal and perineal approaches.  I would favor an abdominal approach as this approach generally has less risk of recurrence.  My personal preference would be to perform a robotic suture rectopexy.  The details of this procedure were discussed including the expected recovery time, risks, benefits and alternatives. Specific risks discussed included but not limited to bleeding, infection, damage to the rectum, bowel, ureter, possible need for conversion to open procedure and possible recurrent prolapse.     Patient expressed understanding and wished to proceed with surgery as scheduled in 2 days on 3/12/2025.  I recommend completion of a bowel prep and  antibiotics per ERAS protocol day prior to surgery.  The patient will be admitted to the hospital overnight for observation after surgery.     No orders of the defined types were placed in this encounter.      Imaging & Referrals   None    Follow Up  No follow-ups on file.    Giancarlo Wilder MD

## 2025-03-10 NOTE — PATIENT INSTRUCTIONS
During this visit, the Enhanced Recovery after Intestinal Surgery (ERAS) Patient Guide was discussed with Edy. He was provided a copy of the guide to review at home, which includes education on the strategy, pre-op, intra-op and what to expect during the hospital stay for elective colorectal cases.

## 2025-03-11 ENCOUNTER — APPOINTMENT (OUTPATIENT)
Dept: PHYSICAL THERAPY | Age: 75
End: 2025-03-11
Attending: STUDENT IN AN ORGANIZED HEALTH CARE EDUCATION/TRAINING PROGRAM
Payer: MEDICARE

## 2025-03-12 ENCOUNTER — HOSPITAL ENCOUNTER (INPATIENT)
Facility: HOSPITAL | Age: 75
LOS: 2 days | Discharge: HOME | End: 2025-03-14
Attending: STUDENT IN AN ORGANIZED HEALTH CARE EDUCATION/TRAINING PROGRAM | Admitting: STUDENT IN AN ORGANIZED HEALTH CARE EDUCATION/TRAINING PROGRAM
Payer: MEDICARE

## 2025-03-12 ENCOUNTER — ANESTHESIA (OUTPATIENT)
Dept: SURGERY | Facility: HOSPITAL | Age: 75
End: 2025-03-12
Payer: MEDICARE

## 2025-03-12 ENCOUNTER — ANESTHESIA EVENT (OUTPATIENT)
Dept: SURGERY | Facility: HOSPITAL | Age: 75
End: 2025-03-12
Payer: MEDICARE

## 2025-03-12 PROBLEM — K62.3 RECTAL PROLAPSE: Status: ACTIVE | Noted: 2025-03-12

## 2025-03-12 PROCEDURE — 45400 LAPAROSCOPIC PROC: CPT

## 2025-03-12 PROCEDURE — 45400 LAPAROSCOPIC PROC: CPT | Performed by: STUDENT IN AN ORGANIZED HEALTH CARE EDUCATION/TRAINING PROGRAM

## 2025-03-12 PROCEDURE — 8E0W4CZ ROBOTIC ASSISTED PROCEDURE OF TRUNK REGION, PERCUTANEOUS ENDOSCOPIC APPROACH: ICD-10-PCS | Performed by: STUDENT IN AN ORGANIZED HEALTH CARE EDUCATION/TRAINING PROGRAM

## 2025-03-12 PROCEDURE — 0DQP4ZZ REPAIR RECTUM, PERCUTANEOUS ENDOSCOPIC APPROACH: ICD-10-PCS | Performed by: STUDENT IN AN ORGANIZED HEALTH CARE EDUCATION/TRAINING PROGRAM

## 2025-03-12 RX ORDER — ACETAMINOPHEN 10 MG/ML
INJECTION, SOLUTION INTRAVENOUS AS NEEDED
Status: DISCONTINUED | OUTPATIENT
Start: 2025-03-12 | End: 2025-03-12 | Stop reason: SURG

## 2025-03-12 RX ORDER — ACETAMINOPHEN 500 MG
1000 TABLET ORAL ONCE AS NEEDED
Status: DISCONTINUED | OUTPATIENT
Start: 2025-03-12 | End: 2025-03-12 | Stop reason: HOSPADM

## 2025-03-12 RX ORDER — SODIUM CHLORIDE 9 MG/ML
INJECTION, SOLUTION INTRAVENOUS CONTINUOUS PRN
Status: DISCONTINUED | OUTPATIENT
Start: 2025-03-12 | End: 2025-03-12 | Stop reason: SURG

## 2025-03-12 RX ORDER — ONDANSETRON 2 MG/ML
INJECTION INTRAMUSCULAR; INTRAVENOUS AS NEEDED
Status: DISCONTINUED | OUTPATIENT
Start: 2025-03-12 | End: 2025-03-12 | Stop reason: SURG

## 2025-03-12 RX ORDER — SODIUM CHLORIDE, SODIUM LACTATE, POTASSIUM CHLORIDE, CALCIUM CHLORIDE 600; 310; 30; 20 MG/100ML; MG/100ML; MG/100ML; MG/100ML
2 INJECTION, SOLUTION INTRAVENOUS CONTINUOUS
Status: DISCONTINUED | OUTPATIENT
Start: 2025-03-12 | End: 2025-03-14

## 2025-03-12 RX ORDER — FAMOTIDINE 20 MG/1
20 TABLET, FILM COATED ORAL 2 TIMES DAILY
Status: DISCONTINUED | OUTPATIENT
Start: 2025-03-12 | End: 2025-03-14

## 2025-03-12 RX ORDER — LIDOCAINE HYDROCHLORIDE ANHYDROUS AND DEXTROSE MONOHYDRATE .8; 5 G/100ML; G/100ML
INJECTION, SOLUTION INTRAVENOUS CONTINUOUS PRN
Status: DISCONTINUED | OUTPATIENT
Start: 2025-03-12 | End: 2025-03-12 | Stop reason: SURG

## 2025-03-12 RX ORDER — METOCLOPRAMIDE HYDROCHLORIDE 5 MG/ML
10 INJECTION INTRAMUSCULAR; INTRAVENOUS EVERY 8 HOURS PRN
Status: DISCONTINUED | OUTPATIENT
Start: 2025-03-12 | End: 2025-03-12 | Stop reason: HOSPADM

## 2025-03-12 RX ORDER — POLYETHYLENE GLYCOL 3350 17 G/17G
17 POWDER, FOR SOLUTION ORAL DAILY PRN
Status: DISCONTINUED | OUTPATIENT
Start: 2025-03-12 | End: 2025-03-13

## 2025-03-12 RX ORDER — ACETAMINOPHEN 500 MG
1000 TABLET ORAL ONCE
Status: DISCONTINUED | OUTPATIENT
Start: 2025-03-12 | End: 2025-03-12

## 2025-03-12 RX ORDER — HYDROMORPHONE HYDROCHLORIDE 1 MG/ML
0.2 INJECTION, SOLUTION INTRAMUSCULAR; INTRAVENOUS; SUBCUTANEOUS EVERY 2 HOUR PRN
Status: DISCONTINUED | OUTPATIENT
Start: 2025-03-12 | End: 2025-03-14

## 2025-03-12 RX ORDER — HYDROMORPHONE HYDROCHLORIDE 1 MG/ML
INJECTION, SOLUTION INTRAMUSCULAR; INTRAVENOUS; SUBCUTANEOUS
Status: COMPLETED
Start: 2025-03-12 | End: 2025-03-12

## 2025-03-12 RX ORDER — BUPIVACAINE HYDROCHLORIDE 2.5 MG/ML
INJECTION, SOLUTION EPIDURAL; INFILTRATION; INTRACAUDAL; PERINEURAL AS NEEDED
Status: DISCONTINUED | OUTPATIENT
Start: 2025-03-12 | End: 2025-03-12 | Stop reason: HOSPADM

## 2025-03-12 RX ORDER — HEPARIN SODIUM 5000 [USP'U]/ML
5000 INJECTION, SOLUTION INTRAVENOUS; SUBCUTANEOUS EVERY 8 HOURS SCHEDULED
Status: DISCONTINUED | OUTPATIENT
Start: 2025-03-13 | End: 2025-03-14

## 2025-03-12 RX ORDER — LIDOCAINE HYDROCHLORIDE 20 MG/ML
10 JELLY TOPICAL AS NEEDED
Status: DISCONTINUED | OUTPATIENT
Start: 2025-03-12 | End: 2025-03-12 | Stop reason: HOSPADM

## 2025-03-12 RX ORDER — METOCLOPRAMIDE HYDROCHLORIDE 5 MG/ML
10 INJECTION INTRAMUSCULAR; INTRAVENOUS EVERY 8 HOURS PRN
Status: DISCONTINUED | OUTPATIENT
Start: 2025-03-12 | End: 2025-03-14

## 2025-03-12 RX ORDER — HYDROCODONE BITARTRATE AND ACETAMINOPHEN 5; 325 MG/1; MG/1
2 TABLET ORAL ONCE AS NEEDED
Status: DISCONTINUED | OUTPATIENT
Start: 2025-03-12 | End: 2025-03-12 | Stop reason: HOSPADM

## 2025-03-12 RX ORDER — HYDROMORPHONE HYDROCHLORIDE 1 MG/ML
0.4 INJECTION, SOLUTION INTRAMUSCULAR; INTRAVENOUS; SUBCUTANEOUS EVERY 5 MIN PRN
Status: DISCONTINUED | OUTPATIENT
Start: 2025-03-12 | End: 2025-03-12 | Stop reason: HOSPADM

## 2025-03-12 RX ORDER — DIPHENHYDRAMINE HYDROCHLORIDE 50 MG/ML
12.5 INJECTION, SOLUTION INTRAMUSCULAR; INTRAVENOUS AS NEEDED
Status: DISCONTINUED | OUTPATIENT
Start: 2025-03-12 | End: 2025-03-12 | Stop reason: HOSPADM

## 2025-03-12 RX ORDER — DEXAMETHASONE SODIUM PHOSPHATE 4 MG/ML
VIAL (ML) INJECTION AS NEEDED
Status: DISCONTINUED | OUTPATIENT
Start: 2025-03-12 | End: 2025-03-12 | Stop reason: SURG

## 2025-03-12 RX ORDER — DIPHENHYDRAMINE HYDROCHLORIDE 50 MG/ML
INJECTION, SOLUTION INTRAMUSCULAR; INTRAVENOUS
Status: COMPLETED
Start: 2025-03-12 | End: 2025-03-12

## 2025-03-12 RX ORDER — ROCURONIUM BROMIDE 10 MG/ML
INJECTION, SOLUTION INTRAVENOUS AS NEEDED
Status: DISCONTINUED | OUTPATIENT
Start: 2025-03-12 | End: 2025-03-12 | Stop reason: SURG

## 2025-03-12 RX ORDER — NEOSTIGMINE METHYLSULFATE 1 MG/ML
INJECTION INTRAVENOUS AS NEEDED
Status: DISCONTINUED | OUTPATIENT
Start: 2025-03-12 | End: 2025-03-12 | Stop reason: SURG

## 2025-03-12 RX ORDER — HYDROCODONE BITARTRATE AND ACETAMINOPHEN 5; 325 MG/1; MG/1
1 TABLET ORAL ONCE AS NEEDED
Status: DISCONTINUED | OUTPATIENT
Start: 2025-03-12 | End: 2025-03-12 | Stop reason: HOSPADM

## 2025-03-12 RX ORDER — FAMOTIDINE 10 MG/ML
20 INJECTION, SOLUTION INTRAVENOUS 2 TIMES DAILY
Status: DISCONTINUED | OUTPATIENT
Start: 2025-03-12 | End: 2025-03-14

## 2025-03-12 RX ORDER — METRONIDAZOLE 500 MG/100ML
500 INJECTION, SOLUTION INTRAVENOUS ONCE
Status: COMPLETED | OUTPATIENT
Start: 2025-03-12 | End: 2025-03-12

## 2025-03-12 RX ORDER — HYDROMORPHONE HYDROCHLORIDE 1 MG/ML
0.6 INJECTION, SOLUTION INTRAMUSCULAR; INTRAVENOUS; SUBCUTANEOUS EVERY 5 MIN PRN
Status: DISCONTINUED | OUTPATIENT
Start: 2025-03-12 | End: 2025-03-12 | Stop reason: HOSPADM

## 2025-03-12 RX ORDER — KETAMINE HYDROCHLORIDE 50 MG/ML
INJECTION, SOLUTION INTRAMUSCULAR; INTRAVENOUS AS NEEDED
Status: DISCONTINUED | OUTPATIENT
Start: 2025-03-12 | End: 2025-03-12 | Stop reason: SURG

## 2025-03-12 RX ORDER — ACETAMINOPHEN 500 MG
1000 TABLET ORAL EVERY 8 HOURS SCHEDULED
Status: DISCONTINUED | OUTPATIENT
Start: 2025-03-12 | End: 2025-03-14

## 2025-03-12 RX ORDER — SENNOSIDES 8.6 MG
17.2 TABLET ORAL 2 TIMES DAILY
Status: DISCONTINUED | OUTPATIENT
Start: 2025-03-12 | End: 2025-03-14

## 2025-03-12 RX ORDER — HEPARIN SODIUM 5000 [USP'U]/ML
5000 INJECTION, SOLUTION INTRAVENOUS; SUBCUTANEOUS ONCE
Status: COMPLETED | OUTPATIENT
Start: 2025-03-12 | End: 2025-03-12

## 2025-03-12 RX ORDER — NALOXONE HYDROCHLORIDE 0.4 MG/ML
0.08 INJECTION, SOLUTION INTRAMUSCULAR; INTRAVENOUS; SUBCUTANEOUS AS NEEDED
Status: DISCONTINUED | OUTPATIENT
Start: 2025-03-12 | End: 2025-03-12 | Stop reason: HOSPADM

## 2025-03-12 RX ORDER — MEPERIDINE HYDROCHLORIDE 25 MG/ML
12.5 INJECTION INTRAMUSCULAR; INTRAVENOUS; SUBCUTANEOUS AS NEEDED
Status: DISCONTINUED | OUTPATIENT
Start: 2025-03-12 | End: 2025-03-12 | Stop reason: HOSPADM

## 2025-03-12 RX ORDER — ONDANSETRON 2 MG/ML
4 INJECTION INTRAMUSCULAR; INTRAVENOUS EVERY 6 HOURS PRN
Status: DISCONTINUED | OUTPATIENT
Start: 2025-03-12 | End: 2025-03-14

## 2025-03-12 RX ORDER — EPHEDRINE SULFATE 50 MG/ML
INJECTION INTRAVENOUS AS NEEDED
Status: DISCONTINUED | OUTPATIENT
Start: 2025-03-12 | End: 2025-03-12 | Stop reason: SURG

## 2025-03-12 RX ORDER — MAGNESIUM OXIDE 400 MG/1
400 TABLET ORAL DAILY
Status: DISCONTINUED | OUTPATIENT
Start: 2025-03-12 | End: 2025-03-14

## 2025-03-12 RX ORDER — OXYCODONE HYDROCHLORIDE 5 MG/1
5 TABLET ORAL EVERY 4 HOURS PRN
Status: DISCONTINUED | OUTPATIENT
Start: 2025-03-12 | End: 2025-03-14

## 2025-03-12 RX ORDER — OXYCODONE HYDROCHLORIDE 5 MG/1
TABLET ORAL
Status: COMPLETED
Start: 2025-03-12 | End: 2025-03-12

## 2025-03-12 RX ORDER — LIDOCAINE HYDROCHLORIDE 10 MG/ML
INJECTION, SOLUTION EPIDURAL; INFILTRATION; INTRACAUDAL; PERINEURAL AS NEEDED
Status: DISCONTINUED | OUTPATIENT
Start: 2025-03-12 | End: 2025-03-12 | Stop reason: SURG

## 2025-03-12 RX ORDER — ONDANSETRON 2 MG/ML
4 INJECTION INTRAMUSCULAR; INTRAVENOUS EVERY 6 HOURS PRN
Status: DISCONTINUED | OUTPATIENT
Start: 2025-03-12 | End: 2025-03-12 | Stop reason: HOSPADM

## 2025-03-12 RX ORDER — GLYCOPYRROLATE 0.2 MG/ML
INJECTION, SOLUTION INTRAMUSCULAR; INTRAVENOUS AS NEEDED
Status: DISCONTINUED | OUTPATIENT
Start: 2025-03-12 | End: 2025-03-12 | Stop reason: SURG

## 2025-03-12 RX ORDER — METOCLOPRAMIDE HYDROCHLORIDE 5 MG/ML
INJECTION INTRAMUSCULAR; INTRAVENOUS AS NEEDED
Status: DISCONTINUED | OUTPATIENT
Start: 2025-03-12 | End: 2025-03-12 | Stop reason: SURG

## 2025-03-12 RX ORDER — HYDROMORPHONE HYDROCHLORIDE 1 MG/ML
0.4 INJECTION, SOLUTION INTRAMUSCULAR; INTRAVENOUS; SUBCUTANEOUS EVERY 2 HOUR PRN
Status: DISCONTINUED | OUTPATIENT
Start: 2025-03-12 | End: 2025-03-14

## 2025-03-12 RX ORDER — HYDROMORPHONE HYDROCHLORIDE 1 MG/ML
0.2 INJECTION, SOLUTION INTRAMUSCULAR; INTRAVENOUS; SUBCUTANEOUS EVERY 5 MIN PRN
Status: DISCONTINUED | OUTPATIENT
Start: 2025-03-12 | End: 2025-03-12 | Stop reason: HOSPADM

## 2025-03-12 RX ORDER — SODIUM CHLORIDE, SODIUM LACTATE, POTASSIUM CHLORIDE, CALCIUM CHLORIDE 600; 310; 30; 20 MG/100ML; MG/100ML; MG/100ML; MG/100ML
INJECTION, SOLUTION INTRAVENOUS CONTINUOUS
Status: DISCONTINUED | OUTPATIENT
Start: 2025-03-12 | End: 2025-03-12 | Stop reason: HOSPADM

## 2025-03-12 RX ORDER — SODIUM CHLORIDE, SODIUM LACTATE, POTASSIUM CHLORIDE, CALCIUM CHLORIDE 600; 310; 30; 20 MG/100ML; MG/100ML; MG/100ML; MG/100ML
INJECTION, SOLUTION INTRAVENOUS CONTINUOUS
Status: DISCONTINUED | OUTPATIENT
Start: 2025-03-12 | End: 2025-03-14

## 2025-03-12 RX ADMIN — LIDOCAINE HYDROCHLORIDE ANHYDROUS AND DEXTROSE MONOHYDRATE 2 MG/KG/HR: .8; 5 INJECTION, SOLUTION INTRAVENOUS at 13:10:00

## 2025-03-12 RX ADMIN — SODIUM CHLORIDE: 9 INJECTION, SOLUTION INTRAVENOUS at 13:03:00

## 2025-03-12 RX ADMIN — LIDOCAINE HYDROCHLORIDE 50 MG: 10 INJECTION, SOLUTION EPIDURAL; INFILTRATION; INTRACAUDAL; PERINEURAL at 12:55:00

## 2025-03-12 RX ADMIN — EPHEDRINE SULFATE 10 MG: 50 INJECTION INTRAVENOUS at 13:36:00

## 2025-03-12 RX ADMIN — KETAMINE HYDROCHLORIDE 10 MG: 50 INJECTION, SOLUTION INTRAMUSCULAR; INTRAVENOUS at 14:17:00

## 2025-03-12 RX ADMIN — ONDANSETRON 4 MG: 2 INJECTION INTRAMUSCULAR; INTRAVENOUS at 15:45:00

## 2025-03-12 RX ADMIN — METOCLOPRAMIDE HYDROCHLORIDE 10 MG: 5 INJECTION INTRAMUSCULAR; INTRAVENOUS at 13:20:00

## 2025-03-12 RX ADMIN — ROCURONIUM BROMIDE 10 MG: 10 INJECTION, SOLUTION INTRAVENOUS at 15:11:00

## 2025-03-12 RX ADMIN — ROCURONIUM BROMIDE 50 MG: 10 INJECTION, SOLUTION INTRAVENOUS at 12:56:00

## 2025-03-12 RX ADMIN — EPHEDRINE SULFATE 10 MG: 50 INJECTION INTRAVENOUS at 13:38:00

## 2025-03-12 RX ADMIN — NEOSTIGMINE METHYLSULFATE 5 MG: 1 INJECTION INTRAVENOUS at 16:03:00

## 2025-03-12 RX ADMIN — ROCURONIUM BROMIDE 20 MG: 10 INJECTION, SOLUTION INTRAVENOUS at 13:52:00

## 2025-03-12 RX ADMIN — GLYCOPYRROLATE 0.8 MG: 0.2 INJECTION, SOLUTION INTRAMUSCULAR; INTRAVENOUS at 16:03:00

## 2025-03-12 RX ADMIN — ACETAMINOPHEN 1000 MG: 10 INJECTION, SOLUTION INTRAVENOUS at 15:39:00

## 2025-03-12 RX ADMIN — METRONIDAZOLE 500 MG: 500 INJECTION, SOLUTION INTRAVENOUS at 13:05:00

## 2025-03-12 RX ADMIN — DEXAMETHASONE SODIUM PHOSPHATE 8 MG: 4 MG/ML VIAL (ML) INJECTION at 13:20:00

## 2025-03-12 NOTE — ANESTHESIA PREPROCEDURE EVALUATION
PRE-OP EVALUATION    Patient Name: Edy Deshpande III    Admit Diagnosis: Rectal prolapse [K62.3]    Pre-op Diagnosis: Rectal prolapse [K62.3]    ROBOTIC SUTURE RECTOPEXY, POSSIBLE OPEN    Anesthesia Procedure: ROBOTIC SUTURE RECTOPEXY, POSSIBLE OPEN    Surgeons and Role:     * Giancarlo Wilder MD - Primary    Pre-op vitals reviewed.  Temp: 97.3 °F (36.3 °C)  Pulse: 62  Resp: 17  BP: 119/70  SpO2: 100 %  Body mass index is 20.45 kg/m².    Current medications reviewed.  Hospital Medications:   acetaminophen (Tylenol Extra Strength) tab 1,000 mg  1,000 mg Oral Once    [COMPLETED] heparin (Porcine) 5000 UNIT/ML injection 5,000 Units  5,000 Units Subcutaneous Once    lactated ringers infusion   Intravenous Continuous    ceFAZolin (Ancef) 2g in 10mL IV syringe premix  2 g Intravenous Once    And    metroNIDAZOLE in sodium chloride 0.79% (Flagyl) 5 mg/mL IVPB premix 500 mg  500 mg Intravenous Once       Outpatient Medications:   Prescriptions Prior to Admission[1]    Allergies: Latex      Anesthesia Evaluation    Patient summary reviewed.    Anesthetic Complications  (-) history of anesthetic complications         GI/Hepatic/Renal      (+) GERD and well controlled                 (+) Crohn's disease  (+) ulcerative colitis       Cardiovascular    Negative cardiovascular ROS.  ECG reviewed.  Exercise tolerance: good     MET: >4                                           Endo/Other    Negative endo/other ROS.                              Pulmonary    Negative pulmonary ROS.                       Neuro/Psych    Negative neuro/psych ROS.                          As per Epic:  Patient Active Problem List:     Crohn's disease of ileum with complication (HCC)     Positive ROSE MARY (antinuclear antibody)     Elevated sed rate     Stricture of esophagus     SBO (small bowel obstruction) (HCC)     Crohn's disease of large bowel (HCC)     Fecal incontinence     Left sided ulcerative (chronic) colitis (HCC)          Past Surgical  History:   Procedure Laterality Date    Bowel resection  1977    Colectomy  1977    Colonoscopy  2012    Colonoscopy N/A 04/15/2022    Procedure: COLONOSCOPY;  Surgeon: Juan Antonio Tapia MD;  Location:  ENDOSCOPY    Hemorrhoidectomy  1977 with bowel resection    Needle biopsy liver  2024    Other      Abdominal surgery 1977    Other surgical history  Crohn's Disease 1977    Sigmoidoscopy,diagnostic  20 years ago?    Tonsillectomy  1955     Social History     Socioeconomic History    Marital status:    Tobacco Use    Smoking status: Never    Smokeless tobacco: Never   Vaping Use    Vaping status: Never Used   Substance and Sexual Activity    Alcohol use: Yes     Alcohol/week: 1.0 standard drink of alcohol     Types: 1 Glasses of wine per week     Comment: occasionally    Drug use: Never   Other Topics Concern    Caffeine Concern No    Exercise Yes    Seat Belt Yes    Special Diet Yes     Comment: no lactose    Stress Concern No    Weight Concern No     History   Drug Use Unknown     Available pre-op labs reviewed.               Airway      Mallampati: II  Mouth opening: >3 FB  TM distance: > 6 cm  Neck ROM: full Cardiovascular      Rhythm: regular  Rate: normal  (-) murmur   Dental  Comment: Dentition is grossly intact;  Patient does not demonstrate loose teeth to inspection.           Pulmonary  Comment: Unlabored ventilatory effort, no retractions.  Pulmonary exam normal.  Breath sounds clear to auscultation bilaterally.               Other findings              ASA: 2   Plan: general  NPO status verified and patient meets guidelines.        Comment: I explained intrinsic risks of general anesthesia, including nausea, dental damage, sore throat, mouth injury,and hoarseness from airway management.  All questions were answered and understanding was demonstrated of risks.  Informed permission was obtained to proceed as documented in the signed consent form.     Plan/risks discussed with:  patient                Present on Admission:  **None**             [1]   Medications Prior to Admission   Medication Sig Dispense Refill Last Dose/Taking    PEG 3350-KCl-Na Bicarb-NaCl 420 g Oral Recon Soln Starting at 4:00 pm the night before procedure, drink 8 ounces of the prep every 15-20 minutes until finished. 1 each 0 3/11/2025    neomycin 500 MG Oral Tab Take 2 tablets by mouth at 3pm, 4pm and 10pm 6 tablet 0 3/11/2025    metroNIDAZOLE (FLAGYL) 500 MG Oral Tab Take 1 tablet by mouth at 3pm, 4pm and 10pm 3 tablet 0 3/11/2025    ergocalciferol 1.25 MG (65410 UT) Oral Cap Take 1 capsule (50,000 Units total) by mouth twice a week. 24 capsule 0 3/6/2025    Turmeric 500 MG Oral Cap Take 500 mg by mouth daily.   3/5/2025    Vitamin C 500 MG Oral Tab Take 1 tablet (500 mg total) by mouth daily.   3/5/2025    Omega-3 Fatty Acids (FISH OIL OR) Take 1 capsule by mouth daily.   3/5/2025    diazePAM (VALIUM) 10 MG Oral Tab Take 1 tablet (10 mg total) by mouth See Admin Instructions. Take 1-2 tablets by mouth 20-30 minutes before procedure. 2 tablet 0     tamsulosin 0.4 MG Oral Cap Take 1 capsule (0.4 mg total) by mouth every evening. 90 capsule 6 3/11/2025 at 10:00 PM

## 2025-03-12 NOTE — OPERATIVE REPORT
[facility]  Operative Note    Edy Deshpande III Location: OR   [facility] [id number] MRN [id number]    1950 Age 74 year old   Admission Date 3/12/2025 Operation Date 3/12/2025   Attending Physician Giancarlo Wilder MD Operating Physician Giancarlo Wilder MD   PCP KINJAL CRUZ          Patient Name: Edy Deshpande III    Preoperative Diagnosis: Rectal prolapse [K62.3]    Postoperative Diagnosis: Same as preoperative diagnosis    Primary Surgeon: Giancarlo Wilder MD    Assistant: Magda Fernandez    Anesthesia: General    Procedures: Robotic suture rectopexy    Implants: None    Specimen: None    Drains: None    Estimated Blood Loss: 10 cc    Complications: None immediate    Condition: Stable    Indications for Surgery:   This is a very nice 74-year-old gentleman referred to me from Dr. Tapia of [facility] with concern for rectal prolapse.  Patient does have a history of ileocolonic Crohn's disease and is currently not on any active treatment for this. He has been in endoscopic and histologic remission and underwent a flexible sigmoidoscopy with Dr. Tapia in July. He refused to have a full colonoscopy.  His most recent full colonoscopy performed on 4/15/2022. He underwent an ileocecectomy in  at [facility].     Patient has noted intermittent anorectal symptoms which she is attributed to hemorrhoids over the last 20+ years.  The symptoms have been more frequent over the last 6-8 months.  He is currently experiencing prolapse of tissue with almost every bowel movement.  After having a bowel movement, patient states he has to sit on a hard surface and the tissue will go back into his rectum.  He denies any rectal pain and has very rare rectal bleeding.  He is currently taking a twice daily fiber supplement.  He denies any straining lately.  He does feel the sensation of incomplete emptying around 50% of the time.  He denies any itching, drainage or incontinence.   No prior anorectal surgery.     I met the patient on 9/26/2024.  At that time, patient was unable to demonstrate any evidence of full-thickness rectal prolapse.  He did have large, prolapsing internal hemorrhoids.  I have treated a right anterior and left lateral internal hemorrhoid with rubber band ligation and sclerotherapy on 10/1/2024 and 11/7/2024.      Unfortunately, patient continued to have prolapse with every bowel movement.  I last saw the patient on 12/5/2024.  Patient was able to demonstrate a 1 inch segment of full-thickness rectal prolapse on examination on the commode after Valsalva.     Patient is currently seeing Radha Justice of pelvic floor physical therapy.  He is still seeing prolapse with almost every bowel movement. Patient did have anorectal manometry on 1/15/2025 with findings as described below     Weakened internal anal sphincter with low resting tone  Paradoxical increase in anal sphincter presssure with adequate rectal push pressures are consistent with Type I dyssynergic defecation, with positive balloon expulsion test  Mild rectal hypersensitivity, consider proctitis, IBS  Presence of rectoanal inhibitory reflex excludes Hirschsprung's disease  Recommend pelvic floor biofeedback     Patient did meet with Dr. Paul Ruby at [facility] for a second opinion.  Dr. Ruby counseled patient that robotic suture rectopexy is a reasonable treatment option for the patient's ongoing, life-limiting rectal prolapse.    I had an extensive conversation with the patient explaining that rectal prolapse can be repaired through abdominal and perineal approaches.  I would favor an abdominal approach as this approach generally has less risk of recurrence.  My personal preference would be to perform a robotic suture rectopexy.  The details of this procedure were discussed including the expected recovery time, risks, benefits and alternatives. Specific risks discussed included but not limited to  bleeding, infection, damage to the rectum, bowel, ureter, possible need for conversion to open procedure and possible recurrent prolapse.      Patient expressed understanding and wished to proceed with surgery as scheduled today.  He completed a bowel prep and antibiotics as instructed per ERAS protocol.  Consent was signed.  All questions answered.    Surgical Findings:   Redundant, floppy sigmoid colon and rectum.  Rectum appeared straight after rectopexy.  Minimal residual redundancy of the rectal mucosa on digital rectal exam after completion of the rectal dissection and rectopexy.     Description of Procedure:   Patient was brought to the operating room and positioned supine on the OR table on a pink foam pad. Bilateral sequential compression devices were placed. Preoperative antibiotics were given. Patient was induced under general endotracheal anesthesia. Patient was positioned in lithotomy.  Both arms were carefully tucked with all pressure points well-padded.  The abdomen was prepped and draped in the usual sterile fashion. A timeout was performed.     I began by establishing pneumoperitoneum using a Veress needle through a stab incision at Bryant's point.  There was appropriately low opening pressure.  An 8 mm robotic trocar was inserted under laparoscopic vision just to the right of midline in the upper abdomen.  The Veress needle was seen free-floating within the peritoneal cavity and was removed.  There was no evidence of underlying visceral injury.  Three additional 8 mm robotic trocars were placed under direct vision in a transverse line spaced about 10 cm apart. A 5 mm laparoscopic airseal assistant trocar was placed under direct vision in the right upper quadrant.  The patient was positioned in steep Trendelenburg.  The da Kathrin XI robotic platform was brought into the field and docked.     The small bowel was swept up and out of the pelvis.  The rectosigmoid colon was then retracted anteriorly and  cephalad out of the pelvis.  The peritoneum was scored on the right side along the reflection between the mesorectum and retroperitoneum. The dissection was continued from the right side then proceeded posteriorly and into the left side along the areolar avascular TME plane.  Both ureters and iliac vessels were identified and preserved throughout this dissection.      We continued the posterior dissection along the TME plane using cauterized scissors. The dissection was then carried down into the pelvis along the presacral fascia. Great care was taken to avoid all autonomic nerves in this region. This dissection carried down to the level of the levator ani musculature.  Any small vessel bleeding was controlled with electrocautery.     We then scored the peritoneum along the anterior peritoneal reflection between the rectum and prostate.  Anterior dissection was continued until the anterior rectum was  from the prostate.  We carried this dissection laterally until the entire rectum along the right side was completely mobilized down to the level of the pelvic floor.  The rectum was straightened up to the level of the sacral promontory.  A digital rectal exam was performed.  This showed this showed no obvious mucosal redundancy.  There was some residual redundancy of the anorectal mucosa within the anal canal.  However, I felt the rectal dissection was completed as distally as I reasonably could down to the level of the pelvic floor both posteriorly and anteriorly.     Two simple interrupted 0 Ethibond sutures were used to pexy the right-sided mesenteric stalks of the rectum with peritoneum to the sacral promontory. The rectum appeared straight but without undue tension after rectopexy.  The cut edges of the peritoneum were reapproximated using a running absorbable 3-0 V-Loc suture.  All needles and excess suture material were successfully retrieved out of the abdomen.  The surgical field remained hemostatic.   The rectum and sigmoid colon were examined and there was no evidence of bowel injury.     The da Kathrin Xi robotic platform was undocked. Pneumoperitoneum was evacuated.  All trocars were removed and each site appeared hemostatic.  30 cc of 0.25% Marcaine was injected around each incision for local anesthesia. The skin at each incision was closed using interrupted 4-0 Monocryl in a subcuticular fashion.  The skin was cleaned and dried and skin glue was placed over each incision as a final dressing.     The patient was carefully taken out of lithotomy, awakened from anesthesia, extubated and transferred to the postanesthesia care unit in stable condition.  All sponge, needle and instrument counts were correct at the end of the case.  I was present for the entire case.      Giancarlo Wilder MD  3/12/2025  4:37 PM

## 2025-03-12 NOTE — ANESTHESIA POSTPROCEDURE EVALUATION
[facility]    Edy Deshpande III Patient Status:  Inpatient   Age/Gender 74 year old male MRN [id number]   Location [facility] SURGERY Attending Giancarlo Wilder MD   Hosp Day # 0 PCP KINJAL CRUZ       Anesthesia Post-op Note    ROBOTIC SUTURE RECTOPEXY    Procedure Summary       Date: 03/12/25 Room / Location:  MAIN OR 08 / [facility] MAIN OR    Anesthesia Start: 1253 Anesthesia Stop: 1613    Procedure: ROBOTIC SUTURE RECTOPEXY (Abdomen) Diagnosis:       Rectal prolapse      (Rectal prolapse [K62.3])    Surgeons: Giancarlo Wilder MD Anesthesiologist: Quincy Escobar MD    Anesthesia Type: general ASA Status: 2            Anesthesia Type: general    Vitals Value Taken Time   /57 03/12/25 1619   Temp 97.4 °F (36.3 °C) 03/12/25 1619   Pulse 64 03/12/25 1619   Resp 9 03/12/25 1619   SpO2 96 % 03/12/25 1619   Vitals shown include unfiled device data.        Patient Location: PACU    Anesthesia Type: general    Airway Patency: patent    Postop Pain Control: adequate    Mental Status: mildly sedated but able to meaningfully participate in the post-anesthesia evaluation    Nausea/Vomiting: none    Cardiopulmonary/Hydration status: stable euvolemic    Complications: no apparent anesthesia related complications    Postop vital signs: stable    Comments: Pt breathing comfortably, VSS, sleepy but arousable. Report to RN.     Dental Exam: Unchanged from Preop    Patient to be discharged from PACU when criteria met.

## 2025-03-12 NOTE — DISCHARGE INSTRUCTIONS
Post-Surgical Discharge Instructions    Giancarlo Wilder MD      Diet:  Continue on a soft diet until 6 weeks after the date of your surgery.  Soft diet means you can eat whatever you want, with moderation, except for the following items:    Celery                        Coconut              Corn                           Dried Fruit  Green peppers           Lettuce  Mushrooms                Nuts  Olives                         Peas  Pickles                       Pineapple  Popcorn                     Spinach              Raw vegetables  Seeds                        Skins of fruits and vegetables    Activity:  No heavy lifting greater than 10 lbs and no exercising for a total of 6 weeks from the date of surgery.  You may walk and climb stairs with moderation. If your abdomen is more uncomfortable than the day before, you need to be less active as you may be pulling on your stitches.    Bathing:  You may shower as often as you would like, but no submerging the incisions under water for a total of 2 weeks from the date of surgery.  This includes no swimming, no baths, and no hot-tubs.      Wound:  Keep the wound dry.  No dressing is necessary unless there is drainage coming from the wound.  If the drainage is excessive or looks like pus, please call our office.    Driving: You may drive provided that you are no longer taking your narcotic pain medication.      Bowel Function:  After surgery, your bowel movements will be irregular.  It is normal to have up to 3 bowel movements a day or as low as 1 bowel movement every 3 days.  Occasionally, your movements may be even more irregular than this.  As long as you are not vomiting or have a fever over 100.3, you don’t need to be overly concerned.      Return to Work:  Most patients return to work after 1-2 weeks from the date of their surgery.  You will still have a lifting restriction of no greater than 10 lbs from the date of your surgery until 6 weeks.  If your work  requires heavy lifting, you will need to stay off work for 6 weeks.  When you are ready to return to work, please call the office and we will send a work release to your employer.      Appointment: Please call our office for an appointment in 10-14 days after surgery, unless otherwise instructed.  This will allow ample time for the swelling and soreness to resolve before your wound is examined. If you have fevers, chills, or if you are concerned about your wound, please call us immediately at [phone].      Thank you for entrusting us with your care.

## 2025-03-12 NOTE — ANESTHESIA PROCEDURE NOTES
Airway  Date/Time: 3/12/2025 1:00 PM  Urgency: elective    Airway not difficult    General Information and Staff    Patient location during procedure: OR  Anesthesiologist: Quincy Escobar MD  Resident/CRNA: Debbi Padilla CRNA  Performed: CRNA   Performed by: Debbi Padilla CRNA  Authorized by: Quincy Escobar MD      Indications and Patient Condition  Indications for airway management: anesthesia  Spontaneous Ventilation: absent  Sedation level: deep  Preoxygenated: yes  Patient position: sniffing  Mask difficulty assessment: 1 - vent by mask    Final Airway Details  Final airway type: endotracheal airway      Successful airway: ETT  Cuffed: yes   Successful intubation technique: direct laryngoscopy  Facilitating devices/methods: intubating stylet  Endotracheal tube insertion site: oral  Blade: Jonh  Blade size: #4  ETT size (mm): 7.5    Cormack-Lehane Classification: grade I - full view of glottis  Placement verified by: capnometry   Measured from: lips  ETT to lips (cm): 23  Number of attempts at approach: 1

## 2025-03-12 NOTE — ANESTHESIA PROCEDURE NOTES
Peripheral IV  Date/Time: 3/12/2025 1:03 PM  Inserted by: Quincy Escobar MD    Placement  Needle size: 18 G  Laterality: left  Location: hand  Local anesthetic: none  Site prep: alcohol  Technique: anatomical landmarks  Attempts: 1

## 2025-03-13 LAB
ANION GAP SERPL CALC-SCNC: 6 MMOL/L (ref 0–18)
BASOPHILS # BLD AUTO: 0.01 X10(3) UL (ref 0–0.2)
BASOPHILS NFR BLD AUTO: 0.1 %
BILIRUB UR QL STRIP.AUTO: NEGATIVE
BUN BLD-MCNC: 23 MG/DL (ref 9–23)
CALCIUM BLD-MCNC: 8.6 MG/DL (ref 8.7–10.6)
CHLORIDE SERPL-SCNC: 106 MMOL/L (ref 98–112)
CLARITY UR REFRACT.AUTO: CLEAR
CO2 SERPL-SCNC: 26 MMOL/L (ref 21–32)
CREAT BLD-MCNC: 1.62 MG/DL
EGFRCR SERPLBLD CKD-EPI 2021: 44 ML/MIN/1.73M2 (ref 60–?)
EOSINOPHIL # BLD AUTO: 0 X10(3) UL (ref 0–0.7)
EOSINOPHIL NFR BLD AUTO: 0 %
ERYTHROCYTE [DISTWIDTH] IN BLOOD BY AUTOMATED COUNT: 14 %
GLUCOSE BLD-MCNC: 152 MG/DL (ref 70–99)
GLUCOSE UR STRIP.AUTO-MCNC: NORMAL MG/DL
HCT VFR BLD AUTO: 28.6 %
HGB BLD-MCNC: 9.3 G/DL
IMM GRANULOCYTES # BLD AUTO: 0.02 X10(3) UL (ref 0–1)
IMM GRANULOCYTES NFR BLD: 0.3 %
KETONES UR STRIP.AUTO-MCNC: NEGATIVE MG/DL
LEUKOCYTE ESTERASE UR QL STRIP.AUTO: NEGATIVE
LYMPHOCYTES # BLD AUTO: 0.85 X10(3) UL (ref 1–4)
LYMPHOCYTES NFR BLD AUTO: 12.4 %
MAGNESIUM SERPL-MCNC: 1.9 MG/DL (ref 1.6–2.6)
MCH RBC QN AUTO: 30.8 PG (ref 26–34)
MCHC RBC AUTO-ENTMCNC: 32.5 G/DL (ref 31–37)
MCV RBC AUTO: 94.7 FL
MONOCYTES # BLD AUTO: 0.48 X10(3) UL (ref 0.1–1)
MONOCYTES NFR BLD AUTO: 7 %
NEUTROPHILS # BLD AUTO: 5.5 X10 (3) UL (ref 1.5–7.7)
NEUTROPHILS # BLD AUTO: 5.5 X10(3) UL (ref 1.5–7.7)
NEUTROPHILS NFR BLD AUTO: 80.2 %
NITRITE UR QL STRIP.AUTO: NEGATIVE
OSMOLALITY SERPL CALC.SUM OF ELEC: 293 MOSM/KG (ref 275–295)
PH UR STRIP.AUTO: 5.5 [PH] (ref 5–8)
PHOSPHATE SERPL-MCNC: 4.6 MG/DL (ref 2.4–5.1)
PLATELET # BLD AUTO: 201 10(3)UL (ref 150–450)
POTASSIUM SERPL-SCNC: 4.9 MMOL/L (ref 3.5–5.1)
PROT UR STRIP.AUTO-MCNC: NEGATIVE MG/DL
RBC # BLD AUTO: 3.02 X10(6)UL
SODIUM SERPL-SCNC: 138 MMOL/L (ref 136–145)
SP GR UR STRIP.AUTO: 1.01 (ref 1–1.03)
UROBILINOGEN UR STRIP.AUTO-MCNC: NORMAL MG/DL
WBC # BLD AUTO: 6.9 X10(3) UL (ref 4–11)

## 2025-03-13 RX ORDER — TAMSULOSIN HYDROCHLORIDE 0.4 MG/1
0.4 CAPSULE ORAL DAILY
Status: DISCONTINUED | OUTPATIENT
Start: 2025-03-13 | End: 2025-03-14

## 2025-03-13 RX ORDER — DOCUSATE SODIUM 100 MG/1
100 CAPSULE, LIQUID FILLED ORAL 2 TIMES DAILY
Status: DISCONTINUED | OUTPATIENT
Start: 2025-03-13 | End: 2025-03-14

## 2025-03-13 RX ORDER — IBUPROFEN 400 MG/1
400 TABLET, FILM COATED ORAL EVERY 6 HOURS PRN
Status: DISCONTINUED | OUTPATIENT
Start: 2025-03-13 | End: 2025-03-14

## 2025-03-13 RX ORDER — POLYETHYLENE GLYCOL 3350 17 G/17G
17 POWDER, FOR SOLUTION ORAL DAILY
Status: DISCONTINUED | OUTPATIENT
Start: 2025-03-13 | End: 2025-03-14

## 2025-03-13 NOTE — PROGRESS NOTES
Glasses at bedside. Admission navigator complete. Front side of body skin check performed with ALANIS Fernando on right thumb covered with bandaid. Declines posterior skin check until catheter is removed in AM. Lap sites to abdomen intact

## 2025-03-13 NOTE — PROGRESS NOTES
[facility]  Progress Note    Edy Deshpande III Patient Status:  Inpatient    1950 MRN [id number]   Location [facility] 3NW-A Attending Giancarlo Wilder MD   Hosp Day # 1 PCP KINJAL CRUZ     Subjective:  The patient is currently ambulating in his room. He reports feeling well from an abdominal standpoint. He is tolerating a full liquid diet without nausea or vomiting. He reports passing a small amount of flatus, denies having a bowel movement.     The patient states he is experience pain with urination. He reports similar symptoms after having a germain catheter placed and removed following an operation about 40 years ago. He has intermittent spasms of penile discomfort. He does not feel comfort discharging home today.    Objective/Physical Exam:  General: Alert, orientated x3.  Cooperative.  No apparent distress.  Vital Signs:  Blood pressure 103/66, pulse 60, temperature 97.7 °F (36.5 °C), temperature source Oral, resp. rate 16, height 67\", weight 130 lb 9.6 oz (59.2 kg), SpO2 99%.  HEENT: Normocephalic, atraumatic. No scleral icterus.  Abdomen:  Soft, non-distended, non-tender, with no rebound or guarding.  No peritoneal signs.  Incision: Robotic incisions are dry, clean, and intact with skin glue in place. No surrounding erythema or drainage. No signs of infection.    Labs:  CBC:    Lab Results   Component Value Date    WBC 6.9 2025    WBC 5.7 2024    WBC 6.1 2024     Lab Results   Component Value Date    HEMOGLOBIN 13.7 2019    HGB 9.3 (L) 2025    HGB 11.3 (L) 2024    HGB 12.2 (L) 2024      Lab Results   Component Value Date    .0 2025    .0 10/15/2024    .0 2024     Lab Results   Component Value Date    WBC 6.9 2025    HGB 9.3 2025    HCT 28.6 2025    .0 2025    CREATSERUM 1.62 2025    BUN 23 2025     2025    K 4.9 2025     2025    CO2 26.0  03/13/2025     03/13/2025    CA 8.6 03/13/2025    MG 1.9 03/13/2025    PHOS 4.6 03/13/2025         Assessment/Plan:  Patient Active Problem List   Diagnosis    Crohn's disease of ileum with complication (HCC)    Positive ROSE MARY (antinuclear antibody)    Elevated sed rate    Stricture of esophagus    SBO (small bowel obstruction) (HCC)    Crohn's disease of large bowel (HCC)    Fecal incontinence    Left sided ulcerative (chronic) colitis (HCC)    Rectal prolapse     POD 1 robotic suture rectopexy     Advance to soft diet.   Will order UA to further evaluate the patient's urinary symptoms.  Recommend colace BID and Miralax once daily.   Continue pain control and antiemetics as needed.  Encourage ambulation and up to chair.  DVT prophylaxis with heparin  GI prophylaxis with pepcid  The patient is stable for discharge home from a general surgery standpoint, though he does not want to discharge given his ongoing dysuria and discomfort.      KAT Manning  3/13/2025  1:24 PM     This note was initiated by Marge Ross PA-C.  The PA saw the patient in conjunction with me. The PA performed a history, exam, and developed the assessment and plan. I agree with the mentioned assessment and plan and have provided further documentation of my own, if necessary.    Patient complains of excruciating pain with urination since the Matias catheter was removed.  He states he is urinating a very small amount with dribbling so far.  The pain is a little more tolerable at this evening.  Patient is having minimal abdominal pain.  No flatus or bowel movement yet.    Patient is well-appearing on exam.  He is afebrile and hemodynamically normal.  His abdomen is soft, mildly distended and appropriately tender to palpation.  Laparoscopic incisions are clean, dry and intact.    I restarted the patient's Flomax and I recommend continuing to allow him to try to urinate while monitoring postvoid residuals.  I did  patient that if  he is unable to urinate and postvoid residuals remain high, we may need to consider reinserting the Matias catheter.  Continue multimodal pain control and limit narcotics as able.  Continue MiraLAX and Colace bowel regimen for now.  Continue DVT prophylaxis.  Encourage out of bed and ambulation.  Plan to keep the patient admitted to the hospital overnight to ensure he is urinating appropriately and without too much difficulty upon discharge.    Giancarlo Wilder MD  [facility]

## 2025-03-13 NOTE — PHYSICAL THERAPY NOTE
Reviewed pt's chart and discuss pt with RN. At this time, pt reports that he is ad-sirisha in his room and is ambulating in the rooms. Pt has more medical concerns secondary to pain. Pt does not have IP PT goals . PT will sign off.

## 2025-03-13 NOTE — PLAN OF CARE
Problem: Patient/Family Goals  Goal: Patient/Family Long Term Goal  Description: Patient's Long Term Goal: Discharge home  Interventions:  - Surgery   - Pain control  - ERAS protocol  - Early ambulation  - See additional Care Plan goals for specific interventions  Outcome: Progressing  Goal: Patient/Family Short Term Goal  Description: Patient's Short Term Goal: Prepare for discharge   Interventions:   - Tolerable pain   - Advance diet as tolerated   - DC germain + void trial    - See additional Care Plan goals for specific interventions  Outcome: Progressing     Problem: GASTROINTESTINAL - ADULT  Goal: Minimal or absence of nausea and vomiting  Description: INTERVENTIONS:- Maintain adequate hydration with IV or PO as ordered and tolerated- Nasogastric tube to low intermittent suction as ordered- Evaluate effectiveness of ordered antiemetic medications- Provide nonpharmacologic comfort measures as appropriate- Advance diet as tolerated, if ordered- Obtain nutritional consult as needed- Evaluate fluid balance  Outcome: Progressing  Goal: Maintains or returns to baseline bowel function  Description: INTERVENTIONS:- Assess bowel function- Maintain adequate hydration with IV or PO as ordered and tolerated- Evaluate effectiveness of GI medications- Encourage mobilization and activity- Obtain nutritional consult as needed- Establish a toileting routine/schedule- Consider collaborating with pharmacy to review patient's medication profile  Outcome: Progressing  Goal: Maintains adequate nutritional intake (undernourished)  Description: INTERVENTIONS:- Monitor percentage of each meal consumed- Identify factors contributing to decreased intake, treat as appropriate- Assist with meals as needed- Monitor I&O, WT and lab values- Obtain nutritional consult as needed- Optimize oral hygiene and moisture- Encourage food from home; allow for food preferences- Enhance eating environment  Outcome: Progressing  Goal: Achieves appropriate  nutritional intake (bariatric)  Description: INTERVENTIONS:- Monitor for over-consumption- Identify factors contributing to increased intake, treat as appropriate- Monitor I&O, WT and lab values- Obtain nutritional consult as needed- Evaluate psychosocial factors contributing to over-consumption  Outcome: Progressing

## 2025-03-13 NOTE — PROGRESS NOTES
Alert and orient x 4 , on room air , on tele with SR , vitals stable , on tele with SB , denies any sob or chest pain . Tolerated diet well , abdominal incisions dry and intact, voided  small amt , rating pain 8/10  during urination , up in room , ambulated in bermudez . Plan of care discussed , answered all questions . Verbalized understanding . Will continue to monitor

## 2025-03-13 NOTE — PROGRESS NOTES
NURSING ADMISSION NOTE      Patient admitted via Cart  Oriented to room.  Safety precautions initiated.  Bed in low position.  Call light in reach.    Pt admitted to the floor in stable condition from the ED.   A/Ox4,1L o2 after PACU, IVF, CLD w/ ERAS protocol in place.  Voiding via germain catheter, orders to remove in the morning.  Only complaints of pain at the catheter site, prn medication given.  No complaints of post op nausea.  Lap sites x6 are clean and intact with skin glue.  Pt updated on POC, call light within reach, questions and concerns addressed.

## 2025-03-13 NOTE — OCCUPATIONAL THERAPY NOTE
OT orders received, pt chart reviewed. Per discussion with pt, pt up walking halls, has been to the bathroom IND, reporting no ADL or functional mobility concerns at this time. OT will sign off. Please re-order if a new functional limitation presents this admission.

## 2025-03-14 VITALS
OXYGEN SATURATION: 93 % | RESPIRATION RATE: 18 BRPM | SYSTOLIC BLOOD PRESSURE: 111 MMHG | BODY MASS INDEX: 20.5 KG/M2 | WEIGHT: 130.63 LBS | DIASTOLIC BLOOD PRESSURE: 66 MMHG | TEMPERATURE: 98 F | HEART RATE: 55 BPM | HEIGHT: 67 IN

## 2025-03-14 LAB — C DIFF TOX B STL QL: NEGATIVE

## 2025-03-14 PROCEDURE — 99239 HOSP IP/OBS DSCHRG MGMT >30: CPT

## 2025-03-14 NOTE — PROGRESS NOTES
[facility]  Progress Note    Edy Deshpande III Patient Status:  Inpatient    1950 MRN [id number]   Location [facility] 3NW-A Attending Giancarlo Wilder MD   Hosp Day # 2 PCP KINJAL CRUZ     Subjective:  The patient is resting in bed. He reports improvement in his urinary symptoms after restarting Flomax. UA negative for UTI.    He now reports fecal incontinence and is concerned about the multiple liquid bowel movements. C. Diff negative.     He denies abdominal pain, nausea or vomiting. He is tolerating a diet.     Objective/Physical Exam:  General: Alert, orientated x3.  Cooperative.  No apparent distress.  Vital Signs:  Blood pressure 111/66, pulse 55, temperature 97.9 °F (36.6 °C), temperature source Oral, resp. rate 18, height 67\", weight 130 lb 9.6 oz (59.2 kg), SpO2 93%.  HEENT: Normocephalic, atraumatic. No scleral icterus.  Abdomen:  Soft, non-distended, non-tender, with no rebound or guarding.  No peritoneal signs.  Incision: Robotic incisions are dry, clean, and intact with skin glue in place. No surrounding erythema or drainage. No signs of infection.    Labs:  CBC:    Lab Results   Component Value Date    WBC 6.9 2025    WBC 5.7 2024    WBC 6.1 2024     Lab Results   Component Value Date    HEMOGLOBIN 13.7 2019    HGB 9.3 (L) 2025    HGB 11.3 (L) 2024    HGB 12.2 (L) 2024      Lab Results   Component Value Date    .0 2025    .0 10/15/2024    .0 2024            Assessment/Plan:  Patient Active Problem List   Diagnosis    Crohn's disease of ileum with complication (HCC)    Positive ROSE MARY (antinuclear antibody)    Elevated sed rate    Stricture of esophagus    SBO (small bowel obstruction) (HCC)    Crohn's disease of large bowel (HCC)    Fecal incontinence    Left sided ulcerative (chronic) colitis (HCC)    Rectal prolapse     POD 2 robotic suture rectopexy     The patient is stable for discharge home  today.  Advised the patient the multiple loose bowel movements is likely to do bowel prep prior to surgery and current bowel regimen. Will stop bowel regimen with colace and miralax as the patient is having loose stool.  Continue diet as tolerated.  Continue pain control and antiemetics as needed. The patient states he is no longer requiring narcotic medications and does not feel like he needs to be prescribed Oxy upon discharged.  Discharge instructions discussed with the patient.  He is to follow up with [facility] in 10-14 days and Dr. Wilder in 1 month.      KAT Manning  3/14/2025  7:32 AM     This note was initiated by Marge Ross PA-C.  The PA saw the patient in conjunction with me. The PA performed a history, exam, and developed the assessment and plan. I agree with the mentioned assessment and plan and have provided further documentation of my own, if necessary.    Giancarlo Wilder MD  [facility]

## 2025-03-14 NOTE — PROGRESS NOTES
A/Ox4, RA, SL, up ad sirisha, reg diet tolerated well.  Voids freely, complains of severe pain/sensitivity during and after urination.  Pain managed with scheduled and prn medication, no complaints of nausea.  Lap sites x6 are clean and intact with skin glue.  Pt updated on POC , call light within reach, questions and concerns addressed.

## 2025-03-14 NOTE — PROGRESS NOTES
NURSING DISCHARGE NOTE    Discharged Home via Wheelchair.  Accompanied by Friend  Belongings Taken by patient/family.    Patient given discharge instructions. Instructed to go to follow-up appointments listed in AVS. IV removed and intact. All questions and concerns addressed at this time.

## 2025-03-18 ENCOUNTER — APPOINTMENT (OUTPATIENT)
Dept: PHYSICAL THERAPY | Age: 75
End: 2025-03-18
Attending: STUDENT IN AN ORGANIZED HEALTH CARE EDUCATION/TRAINING PROGRAM
Payer: MEDICARE

## 2025-03-19 NOTE — DISCHARGE SUMMARY
[facility]  Discharge Summary    Edy Deshpande III Patient Status:  Inpatient    1950 MRN [id number]   Location [facility] 3NW-A Attending No att. providers found   Hosp Day # 2 PCP KINJAL CRUZ     Date of Admission: 3/12/2025    Date of Discharge: 3/14/2025    Admitting Diagnosis: Rectal prolapse [K62.3]       Patient Active Problem List   Diagnosis    Crohn's disease of ileum with complication (HCC)    Positive ROSE MARY (antinuclear antibody)    Elevated sed rate    Stricture of esophagus    SBO (small bowel obstruction) (HCC)    Crohn's disease of large bowel (HCC)    Fecal incontinence    Left sided ulcerative (chronic) colitis (HCC)    Rectal prolapse       Reason for Admission:  Rectal prolapse [K62.3]     Procedures: ROBOTIC SUTURE RECTOPEXY     History of Present Illness: Rectal prolapse [K62.3]    This is a very nice 74-year-old gentleman referred to me from Dr. Tapia of [facility] with concern for rectal prolapse.  Patient does have a history of ileocolonic Crohn's disease and is currently not on any active treatment for this. He has been in endoscopic and histologic remission and underwent a flexible sigmoidoscopy with Dr. Tapia in July. He refused to have a full colonoscopy.  His most recent full colonoscopy performed on 4/15/2022. He underwent an ileocecectomy in  at [facility].     Patient has noted intermittent anorectal symptoms which she is attributed to hemorrhoids over the last 20+ years.  The symptoms have been more frequent over the last 6-8 months.  He is currently experiencing prolapse of tissue with almost every bowel movement.  After having a bowel movement, patient states he has to sit on a hard surface and the tissue will go back into his rectum.  He denies any rectal pain and has very rare rectal bleeding.  He is currently taking a twice daily fiber supplement.  He denies any straining lately.  He does feel the sensation of incomplete  emptying around 50% of the time.  He denies any itching, drainage or incontinence.  No prior anorectal surgery.     I met the patient on 9/26/2024.  At that time, patient was unable to demonstrate any evidence of full-thickness rectal prolapse.  He did have large, prolapsing internal hemorrhoids.  I have treated a right anterior and left lateral internal hemorrhoid with rubber band ligation and sclerotherapy on 10/1/2024 and 11/7/2024.      Unfortunately, patient continued to have prolapse with every bowel movement.  I last saw the patient on 12/5/2024.  Patient was able to demonstrate a 1 inch segment of full-thickness rectal prolapse on examination on the commode after Valsalva.     Patient is currently seeing Radha Justice of pelvic floor physical therapy.  He is still seeing prolapse with almost every bowel movement. Patient did have anorectal manometry on 1/15/2025 with findings as described below     Weakened internal anal sphincter with low resting tone  Paradoxical increase in anal sphincter presssure with adequate rectal push pressures are consistent with Type I dyssynergic defecation, with positive balloon expulsion test  Mild rectal hypersensitivity, consider proctitis, IBS  Presence of rectoanal inhibitory reflex excludes Hirschsprung's disease  Recommend pelvic floor biofeedback     Patient is currently scheduled for robotic suture rectopexy, possible open in 2 days on 3/12/2025.  Patient did meet with Dr. Paul Ruby at [facility] for a second opinion.  Dr. Ruyb counseled patient that robotic suture rectopexy is a reasonable treatment option for the patient's ongoing, life-limiting rectal prolapse.    Hospital Course: The patient tolerated the above listed procedure without complication. He tolerated slow diet advancement and his pain was controled with oral medications. On POD 1, the patient experienced excruciating pain with urination. His Flomax was restarted and had significant  improvement on POD 2.  He also had return of bowel function on POD 2 and was worried about being on a bowel regimen while having loose stool. The patient is being discharged with the following physical exam.      Physical Exam:   General: Alert, orientated x3.  Cooperative.  No apparent distress.  Vital Signs:  Blood pressure 111/66, pulse 55, temperature 97.9 °F (36.6 °C), temperature source Oral, resp. rate 18, height 67\", weight 130 lb 9.6 oz (59.2 kg), SpO2 93%.  HEENT: Normocephalic, atraumatic. No scleral icterus.  Abdomen:  Soft, non-distended, non-tender, with no rebound or guarding.  No peritoneal signs.  Incision: Robotic incisions are dry, clean, and intact with skin glue in place. No surrounding erythema or drainage. No signs of infection.       Consultations: None    Complications: none     Disposition: Home to self care   Discharge Condition: Good    Discharge Medications:   Discharge Medication List as of 3/14/2025  8:55 AM        CONTINUE these medications which have NOT CHANGED    Details   diazePAM (VALIUM) 10 MG Oral Tab Take 1 tablet (10 mg total) by mouth See Admin Instructions. Take 1-2 tablets by mouth 20-30 minutes before procedure., Normal, Disp-2 tablet, R-0      tamsulosin 0.4 MG Oral Cap Take 1 capsule (0.4 mg total) by mouth every evening., Print Script, Disp-90 capsule, R-6      ergocalciferol 1.25 MG (83121 UT) Oral Cap Take 1 capsule (50,000 Units total) by mouth twice a week., Normal, Disp-24 capsule, R-0      Turmeric 500 MG Oral Cap Take 500 mg by mouth daily., Historical      Vitamin C 500 MG Oral Tab Take 1 tablet (500 mg total) by mouth daily., Historical      Omega-3 Fatty Acids (FISH OIL OR) Take 1 capsule by mouth daily., Historical           STOP taking these medications       PEG 3350-KCl-Na Bicarb-NaCl 420 g Oral Recon Soln        neomycin 500 MG Oral Tab        metroNIDAZOLE (FLAGYL) 500 MG Oral Tab              Follow up Visits: Follow-up with [facility] [place] [facility] surgery in  10-14 days and Dr. Wilder in 1 month.    Other Discharge Instructions:    General diet  No bowel regimen as the patient is having loose stool, but Colace BID and Miralax daily was recommended   No lifting heavier than 10 lbs until seen in clinic  The patient may shower  Narcotic medications were not sent per patient's request      KAT Manning  3/19/2025  10:14 AM

## 2025-03-25 ENCOUNTER — APPOINTMENT (OUTPATIENT)
Dept: PHYSICAL THERAPY | Age: 75
End: 2025-03-25
Attending: STUDENT IN AN ORGANIZED HEALTH CARE EDUCATION/TRAINING PROGRAM
Payer: MEDICARE

## 2025-03-25 ENCOUNTER — OFFICE VISIT (OUTPATIENT)
Facility: LOCATION | Age: 75
End: 2025-03-25
Payer: MEDICARE

## 2025-03-25 VITALS
TEMPERATURE: 97 F | HEART RATE: 58 BPM | OXYGEN SATURATION: 98 % | DIASTOLIC BLOOD PRESSURE: 62 MMHG | SYSTOLIC BLOOD PRESSURE: 114 MMHG

## 2025-03-25 DIAGNOSIS — Z98.890 POST-OPERATIVE STATE: Primary | ICD-10-CM

## 2025-03-25 PROCEDURE — 99024 POSTOP FOLLOW-UP VISIT: CPT

## 2025-03-25 NOTE — PROGRESS NOTES
Post Operative Visit Note       Active Problems  1. Post-operative state         Chief Complaint   Chief Complaint   Patient presents with    Post-Op     PO - 3/12 w/ jjs - ROBOTIC SUTURE RECTOPEXY.  Doing ok. BM's ok. Itching on abdomen.            History of Present Illness   The patient presents today for postoperative visit following robotic suture rectopexy on 3/12/2025 with Dr. Wilder.    Patient is doing well postoperatively.  He denies rectal pain, stating he is not taking any medication for pain management.  He endorsed previously significant liquid stools at time of discharge from the hospital, but states his bowel movements are more formed now.  He is not taking any stool softeners or laxatives at this time.  He denies constipation and diarrhea.  He denies bloody stools and fecal incontinence.  He denies recurrence of prolapse.  Patient also endorses complete resolution of dysuria and difficulty with urination that was experienced during his hospital admission.  He states he is no longer taking Flomax.      Allergies  Edy is allergic to latex.    Past Medical / Surgical / Social / Family History    The past medical and past surgical history have been reviewed by me today.     Past Medical History:    Abdominal hernia    not sure    Abdominal pain    had a recent occurrence    Allergic rhinitis    Anemia    not severe, but continues    Back pain    lower back- felt a pop in back    Back problem    LOWER BACK PAIN    Belching    minor    Cancer (HCC)    skin face- MOHS    Constipation    Crohn disease (HCC)    Diarrhea, unspecified    Esophageal reflux    Fatigue    occasional-late afternoon    Flatulence/gas pain/belching    Food intolerance    lactose intolerant    Frequent urination    Heartburn    Hemorrhoids    very mild    High cholesterol    History of COVID-19    2 day fever, fatigue, not hospitalized    Irregular bowel habits    Leaking of urine    minor amount    Mouth sores    rare    Nausea     had a recent occurrence    Night sweats    Osteoporosis    osteoperosis    Problems with swallowing    no longer a problem    Skin cancer    Mohs Surgery-R-Hinduism    Stool incontinence    minor and infrequent    Visual impairment    glasses    Vomiting    had a recent occurrence    Wears glasses     Past Surgical History:   Procedure Laterality Date    Bowel resection  1977    Colectomy  1977    Colonoscopy  2012    Colonoscopy N/A 04/15/2022    Procedure: COLONOSCOPY;  Surgeon: Juan Antonio Tapia MD;  Location:  ENDOSCOPY    Hemorrhoidectomy  1977 with bowel resection    Needle biopsy liver  2024    Other      Abdominal surgery 1977    Other surgical history  Crohn's Disease 1977    Sigmoidoscopy,diagnostic  20 years ago?    Tonsillectomy  1955       The family history and social history have been reviewed by me today.    Family History   Problem Relation Age of Onset    Colon Cancer Maternal Uncle         O'Joselito    Crohn's Disease Sister         Bettina    Diabetes Brother         Sell    Hypertension Brother         Sell    Heart Attack Mother         Sell    Stroke Father         Sell     Social History     Socioeconomic History    Marital status:    Tobacco Use    Smoking status: Never    Smokeless tobacco: Never   Vaping Use    Vaping status: Never Used   Substance and Sexual Activity    Alcohol use: Yes     Alcohol/week: 1.0 standard drink of alcohol     Types: 1 Glasses of wine per week     Comment: occasionally    Drug use: Never   Other Topics Concern    Caffeine Concern No    Exercise Yes    Seat Belt Yes    Special Diet Yes     Comment: no lactose    Stress Concern No    Weight Concern No        Current Outpatient Medications:     ergocalciferol 1.25 MG (63307 UT) Oral Cap, Take 1 capsule (50,000 Units total) by mouth twice a week., Disp: 24 capsule, Rfl: 0    Turmeric 500 MG Oral Cap, Take 500 mg by mouth daily., Disp: , Rfl:     Vitamin C 500 MG Oral Tab, Take 1 tablet (500 mg total) by  mouth daily., Disp: , Rfl:     Omega-3 Fatty Acids (FISH OIL OR), Take 1 capsule by mouth daily., Disp: , Rfl:     tamsulosin 0.4 MG Oral Cap, Take 1 capsule (0.4 mg total) by mouth every evening. (Patient not taking: Reported on 3/25/2025), Disp: 90 capsule, Rfl: 6      Review of Systems  A 10 point Review of Systems has been completed by me today and is negative except as above in the HPI.    Physical Findings   /62 (BP Location: Left arm, Patient Position: Sitting, Cuff Size: adult)   Pulse 58   Temp 97 °F (36.1 °C) (Temporal)   SpO2 98%   Gen/psych: alert and oriented, cooperative, no apparent distress  Cardiovascular: regular rate  Respiratory: respirations unlabored, no wheeze  Perianal: Exam limited to external exam only.  Medical chaperone present for entirety of the exam.  Patient in jackknife position on exam table.  No visible prolapse noted when patient was asked to bear down.  No active bleeding or purulent drainage.  Nontender on exam.         Assessment/Plan  1. Post-operative state      Patient is doing well postoperatively.  Continue soft diet until further evaluation by Dr. Wilder at future appointment.  Patient can slowly incorporate well cooked vegetables into his diet, specifically one type of vegetable at a time.  No lifting over 15 pounds until further evaluation at visit with Dr. Wilder.  Patient may utilize Tylenol and ibuprofen as needed for pain.  All questions were addressed and answered at this time.  Follow-up in clinic with Dr. Wilder on 4/28/2025, sooner if needed.          No orders of the defined types were placed in this encounter.       Imaging & Referrals   None    Follow Up  4/28/2025 with Dr. Wilder, sooner if needed.    KAT Narvaez  [facility]  3/25/2025  1:36 PM

## 2025-03-31 ENCOUNTER — LAB ENCOUNTER (OUTPATIENT)
Dept: LAB | Facility: HOSPITAL | Age: 75
End: 2025-03-31
Attending: INTERNAL MEDICINE
Payer: MEDICARE

## 2025-03-31 DIAGNOSIS — E55.9 VITAMIN D DEFICIENCY: ICD-10-CM

## 2025-03-31 DIAGNOSIS — E53.8 DEFICIENCY OF OTHER SPECIFIED B GROUP VITAMINS: ICD-10-CM

## 2025-03-31 DIAGNOSIS — R73.03 PREDIABETES: ICD-10-CM

## 2025-03-31 DIAGNOSIS — E83.42 HYPOMAGNESEMIA: ICD-10-CM

## 2025-03-31 DIAGNOSIS — E78.5 HYPERLIPIDEMIA, UNSPECIFIED: Primary | ICD-10-CM

## 2025-03-31 DIAGNOSIS — Z12.5 ENCOUNTER FOR SCREENING FOR MALIGNANT NEOPLASM OF PROSTATE: ICD-10-CM

## 2025-03-31 DIAGNOSIS — E55.9 VITAMIN D DEFICIENCY, UNSPECIFIED: ICD-10-CM

## 2025-03-31 DIAGNOSIS — R97.20 ELEVATED PSA: ICD-10-CM

## 2025-03-31 LAB
ALBUMIN SERPL-MCNC: 4.1 G/DL (ref 3.2–4.8)
ALBUMIN/GLOB SERPL: 1.6 {RATIO} (ref 1–2)
ALP LIVER SERPL-CCNC: 66 U/L
ALT SERPL-CCNC: 41 U/L
ANION GAP SERPL CALC-SCNC: 8 MMOL/L (ref 0–18)
AST SERPL-CCNC: 36 U/L (ref ?–34)
BASOPHILS # BLD AUTO: 0.04 X10(3) UL (ref 0–0.2)
BASOPHILS NFR BLD AUTO: 0.8 %
BILIRUB SERPL-MCNC: 0.2 MG/DL (ref 0.2–1.1)
BILIRUB UR QL STRIP.AUTO: NEGATIVE
BUN BLD-MCNC: 29 MG/DL (ref 9–23)
CALCIUM BLD-MCNC: 8.8 MG/DL (ref 8.7–10.6)
CHLORIDE SERPL-SCNC: 110 MMOL/L (ref 98–112)
CHOLEST SERPL-MCNC: 137 MG/DL (ref ?–200)
CLARITY UR REFRACT.AUTO: CLEAR
CO2 SERPL-SCNC: 25 MMOL/L (ref 21–32)
CREAT BLD-MCNC: 1.5 MG/DL
CREAT UR-SCNC: 63.4 MG/DL
EGFRCR SERPLBLD CKD-EPI 2021: 48 ML/MIN/1.73M2 (ref 60–?)
EOSINOPHIL # BLD AUTO: 0.19 X10(3) UL (ref 0–0.7)
EOSINOPHIL NFR BLD AUTO: 3.8 %
ERYTHROCYTE [DISTWIDTH] IN BLOOD BY AUTOMATED COUNT: 14.9 %
EST. AVERAGE GLUCOSE BLD GHB EST-MCNC: 103 MG/DL (ref 68–126)
FASTING PATIENT LIPID ANSWER: NO
FASTING STATUS PATIENT QL REPORTED: NO
FOLATE SERPL-MCNC: 41.3 NG/ML (ref 5.4–?)
GLOBULIN PLAS-MCNC: 2.6 G/DL (ref 2–3.5)
GLUCOSE BLD-MCNC: 97 MG/DL (ref 70–99)
GLUCOSE UR STRIP.AUTO-MCNC: NORMAL MG/DL
HBA1C MFR BLD: 5.2 % (ref ?–5.7)
HCT VFR BLD AUTO: 32.4 %
HDLC SERPL-MCNC: 26 MG/DL (ref 40–59)
HGB BLD-MCNC: 10.7 G/DL
IMM GRANULOCYTES # BLD AUTO: 0.01 X10(3) UL (ref 0–1)
IMM GRANULOCYTES NFR BLD: 0.2 %
KETONES UR STRIP.AUTO-MCNC: NEGATIVE MG/DL
LDLC SERPL CALC-MCNC: 95 MG/DL (ref ?–100)
LEUKOCYTE ESTERASE UR QL STRIP.AUTO: NEGATIVE
LYMPHOCYTES # BLD AUTO: 1.33 X10(3) UL (ref 1–4)
LYMPHOCYTES NFR BLD AUTO: 26.8 %
MAGNESIUM SERPL-MCNC: 2 MG/DL (ref 1.6–2.6)
MCH RBC QN AUTO: 31.4 PG (ref 26–34)
MCHC RBC AUTO-ENTMCNC: 33 G/DL (ref 31–37)
MCV RBC AUTO: 95 FL
MICROALBUMIN UR-MCNC: <0.3 MG/DL
MONOCYTES # BLD AUTO: 0.43 X10(3) UL (ref 0.1–1)
MONOCYTES NFR BLD AUTO: 8.7 %
NEUTROPHILS # BLD AUTO: 2.96 X10 (3) UL (ref 1.5–7.7)
NEUTROPHILS # BLD AUTO: 2.96 X10(3) UL (ref 1.5–7.7)
NEUTROPHILS NFR BLD AUTO: 59.7 %
NITRITE UR QL STRIP.AUTO: NEGATIVE
NONHDLC SERPL-MCNC: 111 MG/DL (ref ?–130)
OSMOLALITY SERPL CALC.SUM OF ELEC: 302 MOSM/KG (ref 275–295)
PH UR STRIP.AUTO: 5.5 [PH] (ref 5–8)
PLATELET # BLD AUTO: 251 10(3)UL (ref 150–450)
POTASSIUM SERPL-SCNC: 4 MMOL/L (ref 3.5–5.1)
PROT SERPL-MCNC: 6.7 G/DL (ref 5.7–8.2)
PROT UR STRIP.AUTO-MCNC: NEGATIVE MG/DL
PSA SERPL-MCNC: 0.75 NG/ML (ref ?–4)
RBC # BLD AUTO: 3.41 X10(6)UL
RBC UR QL AUTO: NEGATIVE
SODIUM SERPL-SCNC: 143 MMOL/L (ref 136–145)
SP GR UR STRIP.AUTO: 1.01 (ref 1–1.03)
TRIGL SERPL-MCNC: 80 MG/DL (ref 30–149)
TSI SER-ACNC: 1.7 UIU/ML (ref 0.55–4.78)
UROBILINOGEN UR STRIP.AUTO-MCNC: NORMAL MG/DL
VIT B12 SERPL-MCNC: 359 PG/ML (ref 211–911)
VIT D+METAB SERPL-MCNC: 40.8 NG/ML (ref 30–100)
VLDLC SERPL CALC-MCNC: 13 MG/DL (ref 0–30)
WBC # BLD AUTO: 5 X10(3) UL (ref 4–11)

## 2025-03-31 PROCEDURE — 84443 ASSAY THYROID STIM HORMONE: CPT

## 2025-03-31 PROCEDURE — 82570 ASSAY OF URINE CREATININE: CPT

## 2025-03-31 PROCEDURE — 85025 COMPLETE CBC W/AUTO DIFF WBC: CPT

## 2025-03-31 PROCEDURE — 80061 LIPID PANEL: CPT

## 2025-03-31 PROCEDURE — 84153 ASSAY OF PSA TOTAL: CPT

## 2025-03-31 PROCEDURE — 82043 UR ALBUMIN QUANTITATIVE: CPT

## 2025-03-31 PROCEDURE — 82746 ASSAY OF FOLIC ACID SERUM: CPT

## 2025-03-31 PROCEDURE — 36415 COLL VENOUS BLD VENIPUNCTURE: CPT

## 2025-03-31 PROCEDURE — 83735 ASSAY OF MAGNESIUM: CPT

## 2025-03-31 PROCEDURE — 81003 URINALYSIS AUTO W/O SCOPE: CPT

## 2025-03-31 PROCEDURE — 82607 VITAMIN B-12: CPT

## 2025-03-31 PROCEDURE — 80053 COMPREHEN METABOLIC PANEL: CPT

## 2025-03-31 PROCEDURE — 82306 VITAMIN D 25 HYDROXY: CPT

## 2025-03-31 PROCEDURE — 83036 HEMOGLOBIN GLYCOSYLATED A1C: CPT

## 2025-04-28 ENCOUNTER — OFFICE VISIT (OUTPATIENT)
Facility: LOCATION | Age: 75
End: 2025-04-28
Payer: MEDICARE

## 2025-04-28 VITALS
SYSTOLIC BLOOD PRESSURE: 110 MMHG | OXYGEN SATURATION: 99 % | DIASTOLIC BLOOD PRESSURE: 61 MMHG | HEART RATE: 59 BPM | TEMPERATURE: 97 F

## 2025-04-28 DIAGNOSIS — K62.3 RECTAL PROLAPSE: Primary | ICD-10-CM

## 2025-04-28 DIAGNOSIS — Z98.890 POST-OPERATIVE STATE: ICD-10-CM

## 2025-04-28 PROCEDURE — 99024 POSTOP FOLLOW-UP VISIT: CPT | Performed by: STUDENT IN AN ORGANIZED HEALTH CARE EDUCATION/TRAINING PROGRAM

## 2025-04-28 NOTE — PROGRESS NOTES
The following individual(s) verbally consented to be recorded using ambient AI listening technology and understand that they can each withdraw their consent to this listening technology at any point by asking the clinician to turn off or pause the recording:    Patient name: Edy Gill Charlee CHONG  Additional names:

## 2025-04-29 NOTE — PROGRESS NOTES
Follow Up Visit Note    The following individual(s) verbally consented to be recorded using ambient AI listening technology and understand that they can each withdraw their consent to this listening technology at any point by asking the clinician to turn off or pause the recording:    Patient name: Edy Deshpande III       Active Problems      1. Rectal prolapse    2. Post-operative state          Chief Complaint   Chief Complaint   Patient presents with    Post-Op     PO - 3/12 w/ jjs - ROBOTIC SUTURE RECTOPEXY. Patient states that he is feeling good. Patient states soreness after BM, Stool brown in color but will see streaks of blood on the stool.  Now states that he has more urgency then he did before. Will be able to make it to the bathroom.            History of Present Illness  History of Present Illness  Edy Deshpande III is a 74 year old male with a history of symptomatic full-thickness rectal prolapse status post robotic suture rectopexy on 3/12/2025.  Patient returns for routine postoperative follow-up today with minor concerns of bowel urgency and occasional bleeding.    He expressed satisfaction with the surgery so far and claims it has been successful, as he no longer experiences prolapse and no longer feels the need to sit on hard surfaces. However, he experiences bowel urgency, needing to find a bathroom quickly. He has stopped using Miralax due to it causing loose stools but continues to take a fiber supplement once or twice daily, mixed with a protein shake.    Stool consistency is generally formed and large, though occasionally loose, with one episode of diarrhea. He is concerned about the size of the stools potentially straining his system. Occasional bleeding is noted, suspected to be blood due to staining on toilet paper, though he is colorblind and not entirely certain. He also experiences drainage between bowel movements, described as mucus or liquid stool, and occasionally suspects it  might be blood.    He experiences cramps before needing to use the bathroom, which are relieved after defecation. No pain from the surgical incisions is reported, but significant pain from catheter use post-surgery resolved after three days.    He has a history of Crohn's disease, managed with surgery 47 years ago, leading to significant improvement in his quality of life. He expresses concern about potential recurrence of symptoms, describing a 'post traumatic stress disorder' feeling from past experiences with Crohn's disease.    He has a history of anemia and vitamin D deficiency, for which he takes vitamin D supplements. His vitamin D levels have improved after increasing the dosage.         Allergies  Edy is allergic to latex.    Past Medical / Surgical / Social / Family History    The past medical and past surgical history have been reviewed by me today.    Past Medical History[1]  Past Surgical History[2]    The family history and social history have been reviewed by me today.    Family History[3]  Social Hx on file[4]   Medications - Current[5]     Review of Systems  A 10 point review of systems was performed and negative unless otherwise documented per HPI.     Physical Findings   /61 (BP Location: Left arm, Patient Position: Sitting, Cuff Size: adult)   Pulse 59   Temp 97.3 °F (36.3 °C) (Temporal)   SpO2 99%   Physical Exam  Vitals and nursing note reviewed. Exam conducted with a chaperone present.   Constitutional:       General: He is not in acute distress.  HENT:      Head: Normocephalic and atraumatic.      Mouth/Throat:      Mouth: Mucous membranes are moist.   Cardiovascular:      Rate and Rhythm: Normal rate and regular rhythm.   Pulmonary:      Effort: Pulmonary effort is normal.   Abdominal:      General: There is no distension.      Palpations: Abdomen is soft.      Tenderness: There is no abdominal tenderness.   Genitourinary:     Comments: Patient examined in the prone jackknife  position with medical assistant chaperone present.  External exam of the anus is normal aside from small hemorrhoids in the right anterior and right posterior position.  No active bleeding, drainage or prolapse.  Digital rectal exam shows surprisingly good tone without any masses, bleeding, drainage or tenderness.  Musculoskeletal:         General: No deformity.   Skin:     General: Skin is warm and dry.   Neurological:      General: No focal deficit present.      Mental Status: He is alert.   Psychiatric:         Mood and Affect: Mood normal.           Assessment & Plan  Small hemorrhoids  Small hemorrhoid tissue with occasional bleeding, but no active bleeding during examination. Possible source of bleeding is hemorrhoids. He expresses concern due to past experiences, but current symptoms are manageable. Hemorrhoidectomy is considered a last resort due to difficult recovery.  - Consider rubber band ligation if bleeding worsens or becomes a nuisance.  - Avoid hemorrhoidectomy unless absolutely necessary due to difficult recovery.    Post-surgical bowel urgency  Post-surgical bowel urgency persists with occasional loose stools and urgency. No active bleeding or significant pain. He manages urgency at home and is generally satisfied with current bowel function. Emphasis on dietary adjustments and fiber intake to manage stool consistency.  - Continue current management with dietary adjustments and fiber intake.    Crohn's disease  Crohn's disease with previous surgery 47 years ago. No current symptoms of exacerbation. He expresses concern about potential recurrence but is well-managed. Recent colonoscopy in 2022, with another recommended in three years unless symptoms worsen.  - Encourage ongoing follow-up with Dr. Tapia.  Patient is due for a surveillance colonoscopy    Chronic anemia  Chronic anemia with low blood count. Vitamin D levels previously low but improved with supplementation. No acute symptoms of anemia.  Blood count remains slightly low, but this is a chronic issue.  - Continue monitoring blood count and vitamin D levels.  - Follow up with primary care physician and Dr. Tapia (GI) for ongoing management.          No orders of the defined types were placed in this encounter.      Imaging & Referrals   None    Follow Up  No follow-ups on file.    Giancarlo Wilder MD           [1]   Past Medical History:   Abdominal hernia    not sure    Abdominal pain    had a recent occurrence    Allergic rhinitis    Anemia    not severe, but continues    Back pain    lower back- felt a pop in back    Back problem    LOWER BACK PAIN    Belching    minor    Cancer (HCC)    skin face- MOHS    Constipation    Crohn disease (HCC)    Diarrhea, unspecified    Esophageal reflux    Fatigue    occasional-late afternoon    Flatulence/gas pain/belching    Food intolerance    lactose intolerant    Frequent urination    Heartburn    Hemorrhoids    very mild    High cholesterol    History of COVID-19    2 day fever, fatigue, not hospitalized    Irregular bowel habits    Leaking of urine    minor amount    Mouth sores    rare    Nausea    had a recent occurrence    Night sweats    Osteoporosis    osteoperosis    Problems with swallowing    no longer a problem    Skin cancer    Mohs Surgery-R-[place]    Stool incontinence    minor and infrequent    UTI (urinary tract infection)    Visual impairment    glasses    Vomiting    had a recent occurrence    Wears glasses   [2]   Past Surgical History:  Procedure Laterality Date    Bowel resection  1977    Colectomy  1977    Colonoscopy  2012    Colonoscopy N/A 04/15/2022    Procedure: COLONOSCOPY;  Surgeon: Juan Antonio Tapia MD;  Location:  ENDOSCOPY    Hemorrhoidectomy  1977 with bowel resection    Needle biopsy liver  2024    Other      Abdominal surgery 1977    Other surgical history  Crohn's Disease 1977    Sigmoidoscopy,diagnostic  20 years ago?    Tonsillectomy  1955   [3]   Family History  Problem  Relation Age of Onset    Colon Cancer Maternal Uncle         Cody    Crohn's Disease Sister         Bettina    Obesity Sister     Diabetes Brother         Sell    Obesity Brother     Hypertension Brother         Sell    Obesity Brother     Heart Attack Mother         Sell    Dementia Mother     Stroke Father         Sell    Obesity Sister     Dementia Sister    [4]   Social History  Socioeconomic History    Marital status:    Tobacco Use    Smoking status: Never    Smokeless tobacco: Never   Vaping Use    Vaping status: Never Used   Substance and Sexual Activity    Alcohol use: Yes     Alcohol/week: 1.0 standard drink of alcohol     Types: 1 Glasses of wine per week     Comment: occasionally    Drug use: Never   Other Topics Concern    Caffeine Concern No    Exercise Yes    Seat Belt Yes    Special Diet Yes     Comment: no lactose    Stress Concern No    Weight Concern No   [5]   Current Outpatient Medications:     ergocalciferol 1.25 MG (54379 UT) Oral Cap, Take 1 capsule (50,000 Units total) by mouth twice a week., Disp: 24 capsule, Rfl: 0    Turmeric 500 MG Oral Cap, Take 500 mg by mouth in the morning., Disp: , Rfl:     Vitamin C 500 MG Oral Tab, Take 1 tablet (500 mg total) by mouth in the morning., Disp: , Rfl:     Omega-3 Fatty Acids (FISH OIL OR), Take 1 capsule by mouth in the morning., Disp: , Rfl:     tamsulosin 0.4 MG Oral Cap, Take 1 capsule (0.4 mg total) by mouth every evening. (Patient not taking: Reported on 4/28/2025), Disp: 90 capsule, Rfl: 6

## 2025-06-17 ENCOUNTER — OFFICE VISIT (OUTPATIENT)
Dept: RHEUMATOLOGY | Facility: CLINIC | Age: 75
End: 2025-06-17
Payer: MEDICARE

## 2025-06-17 VITALS
WEIGHT: 135.38 LBS | HEIGHT: 67 IN | TEMPERATURE: 97 F | OXYGEN SATURATION: 99 % | HEART RATE: 57 BPM | RESPIRATION RATE: 16 BRPM | SYSTOLIC BLOOD PRESSURE: 92 MMHG | DIASTOLIC BLOOD PRESSURE: 56 MMHG | BODY MASS INDEX: 21.25 KG/M2

## 2025-06-17 DIAGNOSIS — K50.019 CROHN'S DISEASE OF ILEUM WITH COMPLICATION (HCC): ICD-10-CM

## 2025-06-17 DIAGNOSIS — Z79.52 LONG-TERM CORTICOSTEROID USE: ICD-10-CM

## 2025-06-17 DIAGNOSIS — M80.00XD AGE-RELATED OSTEOPOROSIS WITH CURRENT PATHOLOGICAL FRACTURE WITH ROUTINE HEALING, SUBSEQUENT ENCOUNTER: Primary | ICD-10-CM

## 2025-06-17 PROCEDURE — 99215 OFFICE O/P EST HI 40 MIN: CPT | Performed by: INTERNAL MEDICINE

## 2025-06-17 PROCEDURE — G2211 COMPLEX E/M VISIT ADD ON: HCPCS | Performed by: INTERNAL MEDICINE

## 2025-06-17 RX ORDER — DOXEPIN HYDROCHLORIDE 50 MG/1
CAPSULE ORAL
COMMUNITY
Start: 2025-02-17

## 2025-06-17 RX ORDER — FERROUS SULFATE 325(65) MG
325 TABLET ORAL
COMMUNITY
Start: 2025-04-29

## 2025-06-17 RX ORDER — CALCIUM ACETATE
POWDER (GRAM) MISCELLANEOUS
COMMUNITY

## 2025-06-17 RX ORDER — FOLIC ACID 1 MG/1
TABLET ORAL
COMMUNITY

## 2025-06-17 NOTE — PROGRESS NOTES
Rheumatology New Patient Note  =====================================================================================================    Date of visit: 6/17/2025  ?  Chief complaint: osteoporosis  Chief Complaint   Patient presents with    New Patient     Rapid 3 score is 0.3     ?  Referring (will send letter)  Dr. Tapia    PCP  KINJAL CRUZ  Fax: [phone]  Phone: [phone]    =====================================================================================================  HPI    Edy Deshpande III is a 74 year old male     Here for further evaluation osteoporosis.  Previously seen by my partner, Dr. Qureshi in 3/2023.   -Diagnosed with osteoporosis in 2012.  -He has experienced multiple fractures  -- 2005: Wrist fracture  --2019: Humerus fracture  --2023: l3/l4 fracture    He has a history of Crohn's disease, with the worst period between ages 16 and 27, leading to surgery in 1977 to repair a fistula and resect six inches of intestine. He reports some inflammation at the surgery site during colonoscopies. He has used steroids for Crohn's in the past.  Not currently on any immunomodulators currently    He was recommended to start Fosamax in the past but he worried about the side effects so he never started the medication.  He has never been on any osteoporosis medications.    -Diagnosed with drug-induced liver injury thought to be due to the multitude of supplements that he was taking.  He has been off most supplements for six months.  He continues on vitamin D, vitamin C, fish oil, and turmeric. Hepatologist suggested stopping turmeric for six to eight weeks to see if liver enzymes improve, and he is about to retest.    He tries to walk three miles daily, though he previously aimed for five miles. He restarted calcium powder a few weeks ago and takes 5000 units of vitamin D daily. Despite this, his vitamin D levels were low, requiring high-dose vitamin D2 supplementation, which eventually brought  levels to the lower acceptable range.    Family history includes a sister with active Crohn's disease and a nephew who may have had it. His parents did not have osteoporosis or fractures. He has allergies to latex. No history of hip fractures. No smoking or heavy alcohol use.    Osteoporosis ROS:  Prior fracture:   2005: fractured right wrist. Jan 2019: fractured right humerus.2023: L3/L4 compression fracture.  Prior fracture in parents: no  Smoking history: no  History of glucocorticoids: yes in the past  Inflammatory arthritis: no  Secondary osteoporosis: no  3 or more alcoholic drinks per day: no  Be treated for osteoporosis: no  History of colon anorexia, asthma, ESRD, hyperparathyroidism, seizures, cancer, IBD, hysterectomy. : yes, IBD  Do perform weightbearing exercises: yes, walks 3 miles each day  To consume dairy products: yes  Do consume caffeinated beverages: yes    14 point ROS negative except noted above    Medications:  Current Medications[1]    Past Medical History:  Past Medical History[2]  Past Surgical History:  Past Surgical History[3]  Family History:  Family History[4]  Social History:  Short Social Hx on File[5]  ?  Allergies:  Allergies[6]      Objective    Vitals:    06/17/25 1340   BP: 92/56   Pulse: 57   Resp: 16   Temp: 96.8 °F (36 °C)   SpO2: 99%   Weight: 135 lb 6.4 oz (61.4 kg)   Height: 5' 7\" (1.702 m)       GEN: NAD, well-nourished.   HEENT: Head: NCAT. Face: No lesions. Eyes: Conjunctiva clear. Sclera are anicteric. PERRLA. EOMs are full. Ears: The right and left ear canals are clear.  Nose: No external or internal nasal deformities. Nasal septum is midline. Mouth: The lips are within normal limits.  No oral ulcers Tongue is midline with no lesions. The oral cavity is clear.   Neck: Supple. No neck masses. No thyromegaly. No LAD, parotid or submandicular gland palpated.   PULM: easy effort  Extremities: No cyanosis, edema or deformities.   Neurologic: Strength, CN2-12 grossly intact    Psych: normal affect.   Skin: No lesions or rashes.  MSK: 28 joint count performed. No evidence of synovitis in mcp, pip, dip, wrist, elbows, shoulders, hips, knees, ankles, mtp unless otherwise noted. Full ROM of elbows, wrists, knees.       ?  Labs:  Lab Results   Component Value Date    WBC 5.0 03/31/2025    RBC 3.41 (L) 03/31/2025    HGB 10.7 (L) 03/31/2025    HCT 32.4 (L) 03/31/2025    .0 03/31/2025    MPV 11.0 (H) 07/10/2012    MCV 95.0 03/31/2025    MCH 31.4 03/31/2025    MCHC 33.0 03/31/2025    RDW 14.9 03/31/2025    NEPRELIM 2.96 03/31/2025    NEPERCENT 59.7 03/31/2025    LYPERCENT 26.8 03/31/2025    MOPERCENT 8.7 03/31/2025    EOPERCENT 3.8 03/31/2025    BAPERCENT 0.8 03/31/2025    NE 2.96 03/31/2025    LYMABS 1.33 03/31/2025    MOABSO 0.43 03/31/2025    EOABSO 0.19 03/31/2025    BAABSO 0.04 03/31/2025     Lab Results   Component Value Date    GLU 97 03/31/2025    BUN 29 (H) 03/31/2025    CREATSERUM 1.50 (H) 03/31/2025    ANIONGAP 8 03/31/2025    GFR 65 10/14/2017    GFRNAA 66 04/28/2022    GFRAA 77 04/28/2022    CA 8.8 03/31/2025    OSMOCALC 302 (H) 03/31/2025    ALKPHO 66 03/31/2025    AST 36 (H) 03/31/2025    ALT 41 03/31/2025    BILT 0.2 03/31/2025    TP 6.7 03/31/2025    ALB 4.1 03/31/2025    GLOBULIN 2.6 03/31/2025     03/31/2025    K 4.0 03/31/2025     03/31/2025    CO2 25.0 03/31/2025         No results found for: \"ANATI\", \"ROSE MARY\", \"ANAS\", \"ANASCRN\", \"ANASCRNRFLX\", \"CAMI\"  No results found for: \"SSA\", \"SSAUR\", \"ANTISSA\", \"SSA52\", \"SSA60\", \"SSADD\", \"SSB\", \"ANTISSB\"  No results found for: \"DSDNA\", \"ANTIDSDNA\", \"SMUD\", \"ANTISM\", \"SM\", \"RNP\", \"ANTIRNP\", \"SMITHRNP\"  No results found for: \"SCL70\", \"SCL\", \"MSYTINH43\"  No results found for: \"C3\", \"C4\"  No results found for: \"DRVVT\", \"LAINT\", \"PTTLUPUS\", \"LUPUSINTERP\", \"LA\", \"L6RR1NLQTE\", \"B7CH3HJVUY\", \"L2NWXVKDFN\", \"R2PDVGKAEW\"  No results found for: \"CARDIOLIPIGG\", \"CARDIOLIPIGM\", \"CARDIOLIPIGA\", \"CARDIOIGA\", \"CARLIP\"      Additional  Labs:    Radiology:    10/2024 DEXA:  LUMBAR SPINE ANALYSIS RESULTS:      Bone mineral density (BMD) (g/cm2):  0.811    Lumbar T-Score:  -2.5      % young normals:  74      % age matched controls:  83      Change from prior spine examination:  5.6*%               TOTAL HIP ANALYSIS RESULTS:        Bone mineral density (BMD) (g/cm2):  0.669      Total Hip T-Score:  -2.4      % young normals:  65      % age matched controls:  74      Change from prior hip examination:  -5.1*%               FEMORAL NECK ANALYSIS RESULTS:        Bone mineral density (BMD) (g/cm2):  0.512      Femoral neck T-Score:  -3.1      % young normals:  55      % age matched controls:  69      Change from prior hip examination:  -5.8*%       Radiology review:      =====================================================================================================  Assessment and Plan  Assessment:  1. Age-related osteoporosis with current pathological fracture with routine healing, subsequent encounter    2. Crohn's disease of ileum with complication (HCC)    3. Long-term corticosteroid use      #Severe osteoporosis with current pathological fracture  -diagnosed 2012  - multiple fragility fractures suffered in the past: 2005: fractured right wrist. Jan 2019: fractured right humerus.2023: L3/L4 compression fracture.  -10/2024 DEXA: T-score of -3.1 at the femoral neck.  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  - Follows with hepatology    #Crohn's disease  -Currently in drug-free endoscopic remission  - Prior bowel resection in the past      Plan:  -If not yet completed. Osteoporosis and/or osteopenia work-up: CMP, CBC, vitamin D, PTH, TSH, mag, Phos, 24 hr calcium/Cr.      -Long discussion with the patient today regarding his severe osteoporosis with multiple fragility fractures noted above.  Discussed he is at very  high risk of future fractures.  Discussed mortality for a hip fracture for male can be as high as 30%.  His risk for hip fracture extrapolated from fracture risk from postmenopausal possible female studies show a 10-year hip fracture risk of almost 20% in the next 10 years.  - Patient adamantly refuses Fosamax.  Discussed options including Reclast, Prolia, Evenity at length.  Gave handouts for the after mentioned medications to him.  Discussed the side effects most of which are fairly rare.  Briefly discussed anabolic such as Forteo, Tymlos but the patient does not want to do any daily injections.  I do NOT think he is a great candidate for Prolia as once one is on Prolia, strict adherence to therapy is essential.    Patient understands need for treatment but still is quite hesitant.  Wants to discuss treatment options with his \"life/health  \" before making a definitive decision.  He is considering trying some more supplements for a period of time before thinking about trying one of the above pharmacologic therapies.  Discussed that he has already had osteoporosis for at least 13 years and suffered many fragility fractures so he really ought to make a definitive decision in a more timely fashion.  It is not like the osteoporosis was recently diagnosed.    Rtc after the above      Total time spent by me on DOS: 60 min  This includes:   Preparing to see the patient (review of tests, prior medical visits)  Obtaining and/or reviewing separately medically appropriate obtained history  Performing the medically appropriate exam and/or evaluation  Clinical documentation in the EHR or other health record  Counseling and educating the patient or caregiver  Interpreting results and/or communicating results to the patient/family/caregiver  Ordering medications, tests, or procedures  Referring and communicating with other health care professionals  Documentation in EHR    Diagnoses and all orders for this  visit:    Age-related osteoporosis with current pathological fracture with routine healing, subsequent encounter  -     PTH, Intact; Future  -     Phosphorus; Future  -     Magnesium; Future  -     Calcium, 24Hr Urine; Future  -     Creatinine, 24-Hour Urine; Future  -     Uric Acid; Future  -     Monoclonal Protein Study; Future  -     Cortisol; Future    Crohn's disease of ileum with complication (HCC)  -     PTH, Intact; Future  -     Phosphorus; Future  -     Magnesium; Future  -     Calcium, 24Hr Urine; Future  -     Creatinine, 24-Hour Urine; Future  -     Uric Acid; Future  -     Monoclonal Protein Study; Future  -     Cortisol; Future    Long-term corticosteroid use        No follow-ups on file.      The above plan of care, diagnosis, orders, and follow-up were discussed with the patient. Questions related to this recommended plan of care were answered.    Thank you for referring this delightful patient to me. Please feel free to contact me with any questions.     This report was performed utilizing speech recognition software technology. Despite proofreading, speech recognition errors could escape detection. If a word or phrase is confusing or out of context, please do not hesitate to call for   clarification.       Kind regards      Reggie Whitaker MD  [facility]         [1]    Ferrous Sulfate 325 (65 Fe) MG Oral Tab Take 1 tablet (325 mg total) by mouth.      Cholecalciferol (VITAMIN D3) 1.25 MG (23008 UT) Oral Cap Take by mouth.      Calcium Acetate Does not apply Powder       Vitamin C 500 MG Oral Tab Take 1 tablet (500 mg total) by mouth in the morning.      Omega-3 Fatty Acids (FISH OIL OR) Take 1 capsule by mouth in the morning.     [2]   Past Medical History:   Abdominal hernia    not sure    Abdominal pain    had a recent occurrence    Allergic rhinitis    Anemia    not severe, but continues    Back pain    lower back- felt a pop in back    Back problem    LOWER BACK PAIN    Belching    minor     Cancer (HCC)    skin face- MOHS    Constipation    Crohn disease (HCC)    Diarrhea, unspecified    Esophageal reflux    Fatigue    occasional-late afternoon    Flatulence/gas pain/belching    Food intolerance    lactose intolerant    Frequent urination    Heartburn    Hemorrhoids    very mild    High cholesterol    History of COVID-19    2 day fever, fatigue, not hospitalized    Irregular bowel habits    Leaking of urine    minor amount    Mouth sores    rare    Nausea    had a recent occurrence    Night sweats    Osteoporosis    osteoperosis    Problems with swallowing    no longer a problem    Rectal prolapse    rectal prolapse surgery    Skin cancer    Mohs Surgery-R-[place]    Stool incontinence    minor and infrequent    UTI (urinary tract infection)    Visual impairment    glasses    Vomiting    had a recent occurrence    Wears glasses   [3]   Past Surgical History:  Procedure Laterality Date    Bowel resection  1977    Colectomy  1977    Colonoscopy  2012    Colonoscopy N/A 04/15/2022    Procedure: COLONOSCOPY;  Surgeon: Juan Antonio Tapia MD;  Location:  ENDOSCOPY    Hemorrhoidectomy  1977 with bowel resection    Needle biopsy liver  2024    Other      Abdominal surgery 1977    Other surgical history  Crohn's Disease 1977    Other surgical history      3/12/2025 surgery to fix rectal prolapse    Sigmoidoscopy,diagnostic  20 years ago?    Tonsillectomy  1955   [4]   Family History  Problem Relation Age of Onset    Colon Cancer Maternal Uncle         [place]    Crohn's Disease Sister         Bettina    Obesity Sister     Diabetes Brother         Sell    Obesity Brother     Hypertension Brother         Sell    Obesity Brother     Heart Attack Mother         Sell    Dementia Mother     Stroke Father         Sell    Obesity Sister     Dementia Sister    [5]   Social History  Socioeconomic History    Marital status:    Tobacco Use    Smoking status: Never    Smokeless tobacco: Never   Vaping Use    Vaping  status: Never Used   Substance and Sexual Activity    Alcohol use: Yes     Alcohol/week: 1.0 standard drink of alcohol     Types: 1 Glasses of wine per week     Comment: occasionally    Drug use: Never   Other Topics Concern    Caffeine Concern No    Exercise Yes    Seat Belt Yes    Special Diet Yes     Comment: no lactose    Stress Concern No    Weight Concern No     Social Drivers of Health     Food Insecurity: No Food Insecurity (3/12/2025)    NCSS - Food Insecurity     Worried About Running Out of Food in the Last Year: No     Ran Out of Food in the Last Year: No   Transportation Needs: No Transportation Needs (3/12/2025)    NCSS - Transportation     Lack of Transportation: No   Housing Stability: Not At Risk (3/12/2025)    NCSS - Housing/Utilities     Has Housing: Yes     Worried About Losing Housing: No     Unable to Get Utilities: No   [6]   Allergies  Allergen Reactions    Latex ITCHING

## 2025-06-17 NOTE — PATIENT INSTRUCTIONS
Declined evenity      Hip fracture risk based on femoral neck T-score.             Vertebral fracture risk based on T-score (diagram shows hip, but T-score at lumbar vertebrae has essentially the same trend).             From:   [email or web address]

## 2025-06-20 ENCOUNTER — LAB ENCOUNTER (OUTPATIENT)
Dept: LAB | Facility: HOSPITAL | Age: 75
End: 2025-06-20
Attending: INTERNAL MEDICINE
Payer: MEDICARE

## 2025-06-20 DIAGNOSIS — K50.019 CROHN'S DISEASE OF ILEUM WITH COMPLICATION (HCC): ICD-10-CM

## 2025-06-20 DIAGNOSIS — R79.89 ELEVATED LIVER FUNCTION TESTS: Primary | ICD-10-CM

## 2025-06-20 DIAGNOSIS — M80.00XD AGE-RELATED OSTEOPOROSIS WITH CURRENT PATHOLOGICAL FRACTURE WITH ROUTINE HEALING, SUBSEQUENT ENCOUNTER: ICD-10-CM

## 2025-06-20 LAB
ALBUMIN SERPL-MCNC: 4.2 G/DL (ref 3.2–4.8)
ALP LIVER SERPL-CCNC: 79 U/L (ref 45–117)
ALT SERPL-CCNC: 54 U/L (ref 10–49)
AST SERPL-CCNC: 47 U/L (ref ?–34)
BILIRUB DIRECT SERPL-MCNC: 0.1 MG/DL (ref ?–0.3)
BILIRUB SERPL-MCNC: 0.3 MG/DL (ref 0.2–1.1)
CK SERPL-CCNC: 147 U/L (ref 46–171)
CORTIS SERPL-MCNC: 16.5 UG/DL
MAGNESIUM SERPL-MCNC: 2 MG/DL (ref 1.6–2.6)
PHOSPHATE SERPL-MCNC: 3.8 MG/DL (ref 2.4–5.1)
PROT SERPL-MCNC: 6.8 G/DL (ref 5.7–8.2)
PTH-INTACT SERPL-MCNC: 46.7 PG/ML (ref 18.5–88)
URATE SERPL-MCNC: 7.1 MG/DL (ref 3.7–9.2)

## 2025-06-20 PROCEDURE — 84550 ASSAY OF BLOOD/URIC ACID: CPT

## 2025-06-20 PROCEDURE — 83735 ASSAY OF MAGNESIUM: CPT

## 2025-06-20 PROCEDURE — 80076 HEPATIC FUNCTION PANEL: CPT

## 2025-06-20 PROCEDURE — 82533 TOTAL CORTISOL: CPT

## 2025-06-20 PROCEDURE — 83521 IG LIGHT CHAINS FREE EACH: CPT

## 2025-06-20 PROCEDURE — 86334 IMMUNOFIX E-PHORESIS SERUM: CPT

## 2025-06-20 PROCEDURE — 84100 ASSAY OF PHOSPHORUS: CPT

## 2025-06-20 PROCEDURE — 82570 ASSAY OF URINE CREATININE: CPT

## 2025-06-20 PROCEDURE — 84165 PROTEIN E-PHORESIS SERUM: CPT

## 2025-06-20 PROCEDURE — 36415 COLL VENOUS BLD VENIPUNCTURE: CPT

## 2025-06-20 PROCEDURE — 83970 ASSAY OF PARATHORMONE: CPT

## 2025-06-20 PROCEDURE — 82550 ASSAY OF CK (CPK): CPT

## 2025-06-21 ENCOUNTER — LAB ENCOUNTER (OUTPATIENT)
Dept: LAB | Facility: HOSPITAL | Age: 75
End: 2025-06-21
Attending: INTERNAL MEDICINE
Payer: MEDICARE

## 2025-06-21 DIAGNOSIS — K50.019 CROHN'S DISEASE OF ILEUM WITH COMPLICATION (HCC): ICD-10-CM

## 2025-06-21 DIAGNOSIS — M80.00XD AGE-RELATED OSTEOPOROSIS WITH CURRENT PATHOLOGICAL FRACTURE WITH ROUTINE HEALING, SUBSEQUENT ENCOUNTER: ICD-10-CM

## 2025-06-21 LAB
CREAT UR-SCNC: 1.07 G/24 HR (ref 0.95–2.49)
SPECIMEN VOL UR: 3100 ML

## 2025-06-23 PROBLEM — E55.9 VITAMIN D DEFICIENCY: Status: ACTIVE | Noted: 2025-02-17

## 2025-06-23 PROBLEM — I25.10 CORONARY ARTERY CALCIFICATION: Status: ACTIVE | Noted: 2025-02-17

## 2025-06-24 LAB
ALBUMIN SERPL ELPH-MCNC: 3.56 G/DL (ref 3.75–5.21)
ALBUMIN/GLOB SERPL: 1.3 {RATIO} (ref 1–2)
ALPHA1 GLOB SERPL ELPH-MCNC: 0.31 G/DL (ref 0.19–0.46)
ALPHA2 GLOB SERPL ELPH-MCNC: 0.66 G/DL (ref 0.48–1.05)
B-GLOBULIN SERPL ELPH-MCNC: 0.77 G/DL (ref 0.68–1.23)
GAMMA GLOB SERPL ELPH-MCNC: 1.01 G/DL (ref 0.62–1.7)
KAPPA LC FREE SER-MCNC: 6.05 MG/DL (ref 0.33–1.94)
KAPPA LC FREE/LAMBDA FREE SER NEPH: 1.83 {RATIO} (ref 0.26–1.65)
LAMBDA LC FREE SERPL-MCNC: 3.3 MG/DL (ref 0.57–2.63)
PROT SERPL-MCNC: 6.3 G/DL (ref 5.7–8.2)

## 2025-07-03 ENCOUNTER — TELEPHONE (OUTPATIENT)
Facility: CLINIC | Age: 75
End: 2025-07-03

## 2025-07-03 DIAGNOSIS — M80.00XD AGE-RELATED OSTEOPOROSIS WITH CURRENT PATHOLOGICAL FRACTURE WITH ROUTINE HEALING, SUBSEQUENT ENCOUNTER: Primary | ICD-10-CM

## 2025-07-03 NOTE — TELEPHONE ENCOUNTER
Spoke with patient and informed of the following. Understanding verbalized. States he currently does not have symptoms of gout. Has reviewed the gout diet. Is seeing a Health  for diet.     I ordered it, but looks like Elisa marked it as \"collected.\" Let pt know about the situation. Will need a recollect. Tell him I am sorry about the situation.

## 2025-07-03 NOTE — TELEPHONE ENCOUNTER
I ordered it, but looks like Elisa marked it as \"collected.\" Let pt know about the situation. Will need a recollect. Tell him I am sorry about the situation.

## (undated) DEVICE — COVER,MAYO STAND,STERILE: Brand: MEDLINE

## (undated) DEVICE — ENDOSCOPY PACK UPPER: Brand: MEDLINE INDUSTRIES, INC.

## (undated) DEVICE — ARM DRAPE

## (undated) DEVICE — Device: Brand: DEFENDO AIR/WATER/SUCTION AND BIOPSY VALVE

## (undated) DEVICE — GLOVE SUR 7.5 SENSICARE PI PIP GRN PWD F

## (undated) DEVICE — SUT ETHBND XL 0 36IN SH NABSRB GRN 26MM 1/2

## (undated) DEVICE — 3M™ RED DOT™ MONITORING ELECTRODE WITH FOAM TAPE AND STICKY GEL, 50/BAG, 20/CASE, 72/PLT 2570: Brand: RED DOT™

## (undated) DEVICE — FENESTRATED BIPOLAR FORCEPS: Brand: ENDOWRIST

## (undated) DEVICE — TRAY CATH FOLEY 16FR INCLUDE LUBRI SIL IC COMPLT

## (undated) DEVICE — 40580 - THE PINK PAD - ADVANCED TRENDELENBURG POSITIONING KIT: Brand: 40580 - THE PINK PAD - ADVANCED TRENDELENBURG POSITIONING KIT

## (undated) DEVICE — BLADELESS OBTURATOR: Brand: WECK VISTA

## (undated) DEVICE — SLEEVE COMPR MD KNEE LEN SGL USE KENDALL SCD

## (undated) DEVICE — DRAPE,TOP,102X53,STERILE: Brand: MEDLINE

## (undated) DEVICE — MEGA SUTURECUT ND: Brand: ENDOWRIST

## (undated) DEVICE — AIRSEAL TRI-LUMEN LILTERED TUBE SET: Brand: AIRSEAL

## (undated) DEVICE — SKN PREP SPNG STKS PVP PNT STR: Brand: MEDLINE INDUSTRIES, INC.

## (undated) DEVICE — SOLUTION PREP 4OZ 10% POVIDONE IOD SCR TOP

## (undated) DEVICE — ABSORBABLE WOUND CLOSURE DEVICE: Brand: V-LOC 90

## (undated) DEVICE — COLUMN DRAPE

## (undated) DEVICE — ENDOPATH ULTRA VERESS INSUFFLATION NEEDLES WITH LUER LOCK CONNECTORS: Brand: ENDOPATH

## (undated) DEVICE — ENDOSCOPY PACK - LOWER: Brand: MEDLINE INDUSTRIES, INC.

## (undated) DEVICE — 1200CC GUARDIAN II: Brand: GUARDIAN

## (undated) DEVICE — TIP COVER ACCESSORY

## (undated) DEVICE — SEAL

## (undated) DEVICE — ADHESIVE SKIN TOP FOR WND CLSR DERMBND ADV

## (undated) DEVICE — SYRINGE MED 10ML LL TIP W/O SFTY DISP

## (undated) DEVICE — COVER,LIGHT,CAMERA,HARD,1/PK,STRL: Brand: MEDLINE

## (undated) DEVICE — HUNTER GASPER TIP, DISPOSABLE: Brand: RENEW

## (undated) DEVICE — AIRSEAL 5 MM ACCESS PORT AND LOW PROFILE OBTURATOR WITH BLADELESS OPTICAL TIP, 120 MM LENGTH: Brand: AIRSEAL

## (undated) DEVICE — FORCEP RADIAL JAW 4

## (undated) DEVICE — SUT MCRYL 4-0 18IN PS-2 ABSRB UD 19MM 3/8 CIR

## (undated) DEVICE — GOWN,SIRUS,FABRNF,XL,20/CS: Brand: MEDLINE

## (undated) DEVICE — SHEET,DRAPE,40X58,STERILE: Brand: MEDLINE

## (undated) DEVICE — LAPAROVUE VISIBILITY SYSTEM LAPAROSCOPIC SOLUTIONS: Brand: LAPAROVUE

## (undated) DEVICE — TIP-UP FENESTRATED GRASPER: Brand: ENDOWRIST

## (undated) DEVICE — FILTERLINE NASAL ADULT O2/CO2

## (undated) DEVICE — GLOVE SUR 7 SENSICARE PI PIP CRM PWD F

## (undated) DEVICE — PACK CDS LAP COLON

## (undated) DEVICE — MONOPOLAR CURVED SCISSORS: Brand: ENDOWRIST

## (undated) NOTE — LETTER
To:  Kraig Higgins   Patient Name: Edy Deshpande III  -Age / Sex: 1950-A: 74 y  male   Medical Records: [id number] [facility]: [id number]    Request for History & Physical for Radiology Procedure at [facility]    The above patient is scheduled to have a procedure performed in Radiology.  In order for the procedure to be performed safely, a comprehensive History & Physical, to include the Review of Systems, is required within 30 days of the scheduled appointment.      Procedure:    Date Scheduled:   Ordered by (Ordering Physician):  2024    Please FAX the completed H&P to [facility] at [phone].  For Questions: Call [facility] [phone]    If you cannot provide us with a comprehensive History & Physical prior to this appointment, you will need to cancel and reschedule the appointment by calling Central Scheduling [phone].  Thank you.

## (undated) NOTE — ED AVS SNAPSHOT
Adithya Howard ARLETTE   MRN: [id number]    Department:  [facility] Emergency Department   Date of Visit:  1/16/2019           Disclosure     Insurance plans vary and the physician(s) referred by the ER may not be covered by your plan.  Please contact y tell this physician (or your personal doctor if your instructions are to return to your personal doctor) about any new or lasting problems. The primary care or specialist physician will see patients referred from the [facility] Emergency Department.  Omaira Holliday

## (undated) NOTE — Clinical Note
Hi, Dr. Isai Maynard and Roque Paulson. Thank you for referring Mr Gentry Lemon for rheumatologic evaluation. Please see the discussion portion of my note and let me know if you have any questions.    Andre Hwang,  [facility] 3/14/2023

## (undated) NOTE — LETTER
Patient Name: Edy Deshpande III        : 1950       Medical Record #: [id number]    CONSENT FOR PROCEDURES/SEDATION    Date: 2024       Time: 11:00AM        1. I authorize the performance upon Edy Deshpande III the following:    __________________________________________________________________________    2. I authorize Dr. Wilder (and whomever is designated as the doctor’s assistant), to perform the above mentioned procedures.    3. If any unforeseen conditions arise during this procedure calling for additional procedures, operations, or medications (including anesthesia and blood transfusion), I  further request and authorize the doctor to do whatever he/she deems advisable in my interest.    4. I consent to the taking and reproduction of any photographs in the course of this procedure for professional purposes.    5. I consent to the administration of such sedation as may be considered necessary or advisable by the physician responsible for this service, with the exception of  _____________________________.    6. I have been informed by my doctor of the nature and purpose of this procedure/sedation, possible alternative methods of treatment, risk involved and possible complications.      Signature of Patient:  ___________________________    Signature of person authorized to consent for patient: Relationship to patient:  ___________________________    ___________________    Witness: ____________________     Date: ______________    Provider: ____________________     Date: ______________

## (undated) NOTE — LETTER
Patient Name: Edy Deshpande III        : 1950       Medical Record #: [id number]    CONSENT FOR PROCEDURES/SEDATION    Date: 10/21/2024       Time: 8:49 AM        1. I authorize the performance upon Edy Deshpande III the following:    ______________HEMORRHOID BANDING________________________________    2. I authorize Dr. Wilder (and whomever is designated as the doctor’s assistant), to perform the above mentioned procedures.    3. If any unforeseen conditions arise during this procedure calling for additional procedures, operations, or medications (including anesthesia and blood transfusion), I  further request and authorize the doctor to do whatever he/she deems advisable in my interest.    4. I consent to the taking and reproduction of any photographs in the course of this procedure for professional purposes.    5. I consent to the administration of such sedation as may be considered necessary or advisable by the physician responsible for this service, with the exception of  _____________________________.    6. I have been informed by my doctor of the nature and purpose of this procedure/sedation, possible alternative methods of treatment, risk involved and possible complications.      Signature of Patient:  ___________________________    Signature of person authorized to consent for patient: Relationship to patient:  ___________________________    ___________________    Witness: ____________________     Date: ______________    Provider: ____________________     Date: ______________

## (undated) NOTE — LETTER
Patient Name: Edy Deshpande III        : 1950       Medical Record #: [id number]    CONSENT FOR PROCEDURES/SEDATION    Date: 10/15/2024       Time: 9:12 AM        1. I authorize the performance upon Edy Deshpande III the following:    _____________image guided liver biopsy ___________________________________    2. I authorize Dr. Anabel Julio (and whomever is designated as the doctor’s assistant), to perform the above mentioned procedures.    3. If any unforeseen conditions arise during this procedure calling for additional procedures, operations, or medications (including anesthesia and blood transfusion), I  further request and authorize the doctor to do whatever he/she deems advisable in my interest.    4. I consent to the taking and reproduction of any photographs in the course of this procedure for professional purposes.    5. I consent to the administration of such sedation as may be considered necessary or advisable by the physician responsible for this service, with the exception of  _____________________________.    6. I have been informed by my doctor of the nature and purpose of this procedure/sedation, possible alternative methods of treatment, risk involved and possible complications.      Signature of Patient:  ___________________________    Signature of person authorized to consent for patient: Relationship to patient:  ___________________________    ___________________    Witness: ____________________     Date: ______________    Provider: ____________________     Date: ______________

## (undated) NOTE — LETTER
To: Dr Higgins  Patient Name: Edy Deshpande III  -Age / Sex: 1950-A: 74 y  male   Medical Records: [id number] [facility]: [id number]    Request for History & Physical for Radiology Procedure at [facility]    The above patient is scheduled to have a procedure performed in Radiology.  In order for the procedure to be performed safely, a comprehensive History & Physical, to include the Review of Systems, is required within 30 days of the scheduled appointment.      Procedure:    Date Scheduled:   Ordered by (Ordering Physician):  Dr Tapia    Please FAX the completed H&P to [facility] [place] [place] at [phone].  For Questions: Call [facility] [place] [place] [phone]    If you cannot provide us with a comprehensive History & Physical prior to this appointment, you will need to cancel and reschedule the appointment by calling Central Scheduling [phone].  Thank you.

## (undated) NOTE — LETTER
24    Patient: Edy Deshpande III  : 1950 Visit date: 2024    Dear  Juan Antonio Tapia MD    Thank you for referring Edy Deshpande III to my practice.  Please find my assessment and plan below.     Assessment   1. Grade III hemorrhoids    2. Rectal prolapse        This is a very nice 74-year-old gentleman referred to me from Dr. Tapia of [facility] with concern for rectal prolapse.  Patient does have a history of ileocolonic Crohn's disease and is currently not on any active treatment for this. He has been in endoscopic and histologic remission and underwent a flexible sigmoidoscopy with Dr. Tapia in July. He refused to have a full colonoscopy.  He underwent an ileocecectomy in  at [facility].    Patient has noted intermittent anorectal symptoms which she is attributed to hemorrhoids over the last 20+ years.  The symptoms have been more frequent over the last 6-8 months.  He is currently experiencing prolapse of tissue with almost every bowel movement.  After having a bowel movement, patient states he has to sit on a hard surface and the tissue will go back into his rectum.  He denies any rectal pain and has very rare rectal bleeding.  He is currently taking a twice daily fiber supplement.  He denies any straining lately.  He does feel the sensation of incomplete emptying around 50% of the time.  He denies any itching, drainage or incontinence.  No prior anorectal surgery.    Patient last saw Dr. Tapia in the office on 9/3/2024.  Dr. Tapia's exam describes an everted rectum which was erythematous, soft and mildly tender.  Dr. Tapia was able to reduce the prolapsed rectum and notes no external hemorrhoids.  Patient himself is unsure if he feels like his whole rectum is prolapsing out or if it is hemorrhoid tissue.    External exam of the anus is normal.  No active prolapsed tissue.  Digital rectal exam shows fair tone and squeeze without any masses, bleeding,  drainage or tenderness.  Anoscopy reveals prolapsing distal rectal and internal hemorrhoid tissue especially in the right anterior and right posterior positions.      I did also examine the patient on the toilet after straining.  Patient was unable to reproduce any prolapsed tissue.    Plan  I counseled patient that his bedside exam with anoscopy did show large, prolapsing internal hemorrhoids and some prolapsing distal rectal mucosa.  I was unable to appreciate any obvious evidence of full-thickness rectal prolapse but this is still possible given Dr. Tapia's exam findings.      I would recommend starting with rubber band ligation and sclerotherapy for the large, prolapsing internal hemorrhoids to see if this will reduce his prolapse symptoms.  I counseled patient that this procedure can generally be performed safely at the bedside but may require 3-4 sessions to fully treat his disease.  The procedure was discussed in detail including the risks, benefits and alternatives.  Patient expressed understanding and was agreeable to begin a series of rubber band ligation/sclerotherapy treatments for his internal hemorrhoids.    Patient may also benefit from pelvic floor physical therapy especially if he continues to struggle with straining and incomplete bowel movements.  Patient was agreeable to consider this and was given the pelvic floor physical therapy office numbers with a referral order.  Lastly patient fails to improve after rubber band ligation/sclerotherapy treatment and/or there is definitive evidence of full-thickness rectal prolapse, we may need to consider more aggressive surgical intervention such as robotic suture rectopexy.  Patient expressed understanding and was agreeable to plan.      Sincerely,       Giancarlo Wilder MD   CC:   No Recipients